# Patient Record
Sex: FEMALE | Race: WHITE
[De-identification: names, ages, dates, MRNs, and addresses within clinical notes are randomized per-mention and may not be internally consistent; named-entity substitution may affect disease eponyms.]

---

## 2019-02-15 NOTE — NUR
Pharmacy Vancomycin Dosing Note



S:Consulted to monitor and dose vancomycin started 02/15/19.



O:VAL NOLASCO is a 58 year old F with 

Cellulitis .



Height: 5 feet, 4 inches

Weight: 74.930200 kg

Ideal Body Weight: 54.70 

Adjusted Body Weight: 62.82 

Dosing Weight: Actual



Other Antibiotics: 

CIPRO X 1, MNZ X 1 2/15



LABS:

Last BUN: 24 

Last Creatinine: 1.1 

Creatinine Clearance: 55 mL/min

Last WBC: 10.3 

Last Procalcitonin: N/A 

Tmax (past 24 hours): 98.8 



Microbiology: 

2/15 NONE



I/O: - 



Drug Levels:

Last  level:  on  at 

Last dose given 02/15/19 at 1800 



Vancomycin Dosing:

Loading Dose: 1500 mg x1

Dosing Weight: Actual

Target Trough: 10-20





A: Based on: WEIGHT, CRCL~55, NON-SEVERE INFECTION,





P: 1. Initiate Vancomycin 1000 mg IV q24h after 1250 mg loading dose.

   2. Follow up Trough level on 02/17/19 at 1730 

   3. Pharmacy will continue to monitor, follow and adjust therapy as needed.

 

 SATURNINO DUKE RPH, 02/15/19 2014

## 2019-02-15 NOTE — RAD
CT scan of the abdomen and pelvis with contrast 2/15/2019

 

CLINICAL HISTORY: Abdominal pain and bleeding around fistula site. 

Colectomy.

 

TECHNIQUE: After the intravenous administration of 60 cc of Omnipaque 300 

only, contiguous, 5 mm axial sections were obtained through the abdomen 

and pelvis. 

 

One or more of the following individualized dose reduction techniques were

utilized for this study:

 

1. Automated exposure control.

2. Adjustment of the mA and/or kV according to patient size.

3. Use of iterative reconstruction technique.

 

FINDINGS: No previous imaging studies are available for comparison.

 

The absence of oral contrast material limits the study for the detection 

of bowel pathology.

 

Images through the lung bases demonstrate mild cardiomegaly. There is a 

small left pleural effusion. Left lower lobe atelectasis and/or infiltrate

is seen.

 

The liver is mild to moderately enlarged measuring 23 cm in length. 

Decreased attenuation of the liver parenchyma is seen consistent with 

fatty infiltration. The spleen is mildly enlarged measuring 13.5 cm in 

length. Fullness of the left adrenal gland is seen. The patient appears to

be post right adrenalectomy. No focal abnormality of the right kidney is 

noted. A 4 cm rounded low-attenuation lesion is seen involving the 

superior/midpole left kidney. This likely represents a cyst.

 

The head of the pancreas is enlarged. Increased density is seen within the

adjacent fat. These findings are consistent with pancreatitis. A somewhat 

rounded low-attenuation area is seen within the lateral aspect of the head

of the pancreas which measures 3.1 cm in greatest diameter. This is felt 

to most likely represent a pancreatic pseudocyst.

 

Atherosclerotic calcification of the abdominal aorta and its branches is 

noted. The abdominal aorta tapers normally. Surgical clips are seen in the

region of the gallbladder fossa consistent with a cholecystectomy. The 

patient appears to be post right hemicolectomy. Multiple diverticula are 

seen involving the transverse, descending and sigmoid colon. No 

inflammatory changes are seen in the adjacent fat. A large ventral hernia 

is seen which contains the majority of the bowel. The hernia sac measures 

32 cm transverse diameter. Surgical clips are seen within the right 

abdomen in the region of the ileum. Loops of ileum extend immediately 

adjacent to the anterior abdominal wall within the right lobe lower 

quadrant of the abdomen which appears to represent an ileostomy. There is 

no evidence of bowel obstruction. No free fluid or free air is seen within

the abdomen. No abnormal fluid collection is seen to suggest evidence of 

an abscess.

 

Images through the pelvis demonstrate the urinary bladder to be 

contracted. A punctate calcification is seen within the left pelvis 

consistent with a phlebolith. No free fluid is seen. Minimal S-shaped 

curvature of the thoracolumbar spine is seen. Degenerative changes are 

seen involving the lower thoracic and throughout the lumbar spine and both

hips.

 

IMPRESSION:

1. Postsurgical changes are seen as outlined above. There is no evidence 

of obstruction. No abscess is seen.

2. The head of the pancreas is enlarged. Increased density is seen within 

the adjacent fat. These findings likely reflect acute pancreatitis. A 3.1 

cm low-attenuation area is seen lateral to the head of the pancreas is 

felt to most represent a pancreatic pseudocyst.

3. Hepatosplenomegaly with fatty infiltration of the liver.

 

Electronically signed by: Maurice Henriquez MD (2/15/2019 6:00 PM) [facility]

## 2019-02-15 NOTE — PHYS DOC
Past Medical History


Past Medical History:  COPD, Diverticulitis


Additional Past Medical Histor:  OSTOMY IN 2007, FISTULA IN 2018


 (JERZY SIERRA)


Past Surgical History:  Colectomy


 (JERZY SIERRA)


Alcohol Use:  None


Drug Use:  None


 (JERZY SIERRA)





Adult General


Chief Complaint


Chief Complaint:  GI PROBLEM





HPI


HPI





Patient is a 58  year old female who presents to the ER with complaints of 

nausea and vomiting for the last 3 days. Pt also complains of bleeding around 

her abdominal fistula since this morning. She denies any fever, cough, 

shortness of breath, or chest pain. She states that the skin around her fistula 

and ileostomy bags has been red, warm, and tender for several days.  She denies 

any pain at this time. PT states that she has a PICC line in her right arm that 

she has been receiving TPN in since August. Pt states that just last week she 

started eating solids again. She denies any difficulty with eating or food 

intolerance.


 (JERZY SIERRA)





Review of Systems


Review of Systems





Constitutional: Denies fever or chills []


HENT: Denies nasal congestion or sore throat []


Respiratory: Denies cough or shortness of breath []


Cardiovascular: No additional information not addressed in HPI []


GI: See HPI


: Denies dysuria or hematuria []


Musculoskeletal: Denies back pain or joint pain []


Integument: See HPI


Neurologic: Denies headache, focal weakness or sensory changes []


Endocrine: Denies polyuria or polydipsia []





Complete systems were reviewed and found to be within normal limits, except as 

documented in this note.


 (JERZY SIERRA)





Current Medications


Current Medications





Current Medications








 Medications


  (Trade)  Dose


 Ordered  Sig/Laura  Start Time


 Stop Time Status Last Admin


Dose Admin


 


 Info


  (CONTRAST GIVEN


 -- Rx MONITORING)  1 each  PRN DAILY  PRN  2/15/19 16:00


 2/17/19 15:59   





 


 Iohexol


  (Omnipaque 300


 Mg/ml)  60 ml  1X  ONCE  2/15/19 16:30


 2/15/19 16:33 DC 2/15/19 17:25


60 ML


 


 Morphine Sulfate


  (Morphine


 Sulfate)  4 mg  1X  ONCE  2/15/19 16:30


 2/15/19 16:33 DC 2/15/19 16:46


4 MG


 


 Ondansetron HCl


  (Zofran)  4 mg  1X  ONCE  2/15/19 16:30


 2/15/19 16:31 DC 2/15/19 16:43


4 MG


 


 Sodium Chloride  1,000 ml @ 


 1,000 mls/hr  1X  ONCE  2/15/19 16:30


 2/15/19 17:29 DC 2/15/19 16:39


1,000 MLS/HR


 


 Vancomycin HCl


  (Vanco Per


 Pharmacy)  1 each  PRN DAILY  PRN  2/15/19 17:15


    2/15/19 20:13


1 EACH


 


 Vancomycin HCl


 1.5 gm/Sodium


 Chloride  500 ml @ 


 250 mls/hr  1X  ONCE  2/15/19 17:30


 2/15/19 19:29 DC 2/15/19 18:17


250 MLS/HR





 (YI TORREZ MD)





Allergies


Allergies





Allergies








Coded Allergies Type Severity Reaction Last Updated Verified


 


  hydrocodone Allergy Intermediate Nausea 2/15/19 Yes





 (YI TORREZ MD)





Physical Exam


Physical Exam





Constitutional: Well developed, well nourished, no acute distress, non-toxic 

appearance, obese []


HENT: Normocephalic, atraumatic, bilateral external ears normal, oropharynx 

moist, nose normal. []


Eyes: conjunctiva normal, no discharge. [] 


Neck: Normal range of motion, no stridor. [] 


Cardiovascular:Heart rate regular rhythm, no murmur []


Lungs & Thorax:  Bilateral breath sounds clear to auscultation []


Abdomen: Bowel sounds normal, soft, tenderness to palpation of RUQ and LUQ, no 

guarding, fistula and ileostomy present and draining yellow stool, no masses, 

no pulsatile masses. [] 


Skin: Warm, dry; skin surrounding abdominal fistula and ileostomy there is 

bright red blood present under the dressing no visible laceration. 


Extremities: No cyanosis, no clubbing, ROM intact, no edema. [] 


Neurologic: Alert and oriented X 3, normal motor function, normal sensory 

function, no focal deficits noted. []


Psychologic: Affect normal, judgement normal, mood normal. []


 (JERZY SIERRA)





Current Patient Data


Vital Signs





 Vital Signs








  Date Time  Temp Pulse Resp B/P (MAP) Pulse Ox O2 Delivery O2 Flow Rate FiO2


 


2/15/19 17:16   20  98 Room Air  


 


2/15/19 16:46       2.0 


 


2/15/19 14:44 98.8 110  114/64 (81)    





 98.8       





 (YI TORREZ MD)


Lab Values





 Laboratory Tests








Test


 2/15/19


14:35 2/15/19


15:10


 


White Blood Count


 10.3 x10^3/uL


(4.0-11.0) 





 


Red Blood Count


 4.08 x10^6/uL


(3.50-5.40) 





 


Hemoglobin


 10.1 g/dL


(12.0-15.5)  L 





 


Hematocrit


 32.7 %


(36.0-47.0)  L 





 


Mean Corpuscular Volume


 80 fL ()


 





 


Mean Corpuscular Hemoglobin 25 pg (25-35)   


 


Mean Corpuscular Hemoglobin


Concent 31 g/dL


(31-37) 





 


Red Cell Distribution Width


 19.3 %


(11.5-14.5)  H 





 


Platelet Count


 338 x10^3/uL


(140-400) 





 


Neutrophils (%) (Auto) 76 % (31-73)  H 


 


Lymphocytes (%) (Auto) 10 % (24-48)  L 


 


Monocytes (%) (Auto) 10 % (0-9)  H 


 


Eosinophils (%) (Auto) 3 % (0-3)   


 


Basophils (%) (Auto) 1 % (0-3)   


 


Neutrophils # (Auto)


 7.8 x10^3uL


(1.8-7.7)  H 





 


Lymphocytes # (Auto)


 1.0 x10^3/uL


(1.0-4.8) 





 


Monocytes # (Auto)


 1.0 x10^3/uL


(0.0-1.1) 





 


Eosinophils # (Auto)


 0.3 x10^3/uL


(0.0-0.7) 





 


Basophils # (Auto)


 0.1 x10^3/uL


(0.0-0.2) 





 


Sodium Level


 131 mmol/L


(136-145)  L 





 


Potassium Level


 3.9 mmol/L


(3.5-5.1) 





 


Chloride Level


 96 mmol/L


()  L 





 


Carbon Dioxide Level


 28 mmol/L


(21-32) 





 


Anion Gap 7 (6-14)   


 


Blood Urea Nitrogen


 24 mg/dL


(7-20)  H 





 


Creatinine


 1.1 mg/dL


(0.6-1.0)  H 





 


Estimated GFR


(Cockcroft-Gault) 51.2  


 





 


BUN/Creatinine Ratio 22 (6-20)  H 


 


Glucose Level


 122 mg/dL


(70-99)  H 





 


Calcium Level


 9.4 mg/dL


(8.5-10.1) 





 


Total Bilirubin


 0.3 mg/dL


(0.2-1.0) 





 


Aspartate Amino Transferase


(AST) 140 U/L


(15-37)  H 





 


Alanine Aminotransferase (ALT)


 82 U/L (14-59)


H 





 


Alkaline Phosphatase


 189 U/L


()  H 





 


Total Protein


 8.0 g/dL


(6.4-8.2) 





 


Albumin


 2.2 g/dL


(3.4-5.0)  L 





 


Albumin/Globulin Ratio


 0.4 (1.0-1.7)


L 





 


Lipase


 487 U/L


()  H 





 


Stool Occult Blood


 


 Negative (NEG)








 Laboratory Tests


2/15/19 14:35








 Laboratory Tests


2/15/19 14:35








 (YI TORREZ MD)





EKG


EKG


[]


 (JERZY SIERRA)





Radiology/Procedures


Radiology/Procedures


PROCEDURE: CT ABD PELV W/ IV CONTRST ONLY





CT scan of the abdomen and pelvis with contrast 2/15/2019


 


CLINICAL HISTORY: Abdominal pain and bleeding around fistula site. 


Colectomy.


 


TECHNIQUE: After the intravenous administration of 60 cc of Omnipaque 300 


only, contiguous, 5 mm axial sections were obtained through the abdomen 


and pelvis. 


 


One or more of the following individualized dose reduction techniques were


utilized for this study:


 


1. Automated exposure control.


2. Adjustment of the mA and/or kV according to patient size.


3. Use of iterative reconstruction technique.


 


FINDINGS: No previous imaging studies are available for comparison.


 


The absence of oral contrast material limits the study for the detection 


of bowel pathology.


 


Images through the lung bases demonstrate mild cardiomegaly. There is a 


small left pleural effusion. Left lower lobe atelectasis and/or infiltrate


is seen.


 


The liver is mild to moderately enlarged measuring 23 cm in length. 


Decreased attenuation of the liver parenchyma is seen consistent with 


fatty infiltration. The spleen is mildly enlarged measuring 13.5 cm in 


length. Fullness of the left adrenal gland is seen. The patient appears to


be post right adrenalectomy. No focal abnormality of the right kidney is 


noted. A 4 cm rounded low-attenuation lesion is seen involving the 


superior/midpole left kidney. This likely represents a cyst.


 


The head of the pancreas is enlarged. Increased density is seen within the


adjacent fat. These findings are consistent with pancreatitis. A somewhat 


rounded low-attenuation area is seen within the lateral aspect of the head


of the pancreas which measures 3.1 cm in greatest diameter. This is felt 


to most likely represent a pancreatic pseudocyst.


 


Atherosclerotic calcification of the abdominal aorta and its branches is 


noted. The abdominal aorta tapers normally. Surgical clips are seen in the


region of the gallbladder fossa consistent with a cholecystectomy. The 


patient appears to be post right hemicolectomy. Multiple diverticula are 


seen involving the transverse, descending and sigmoid colon. No 


inflammatory changes are seen in the adjacent fat. A large ventral hernia 


is seen which contains the majority of the bowel. The hernia sac measures 


32 cm transverse diameter. Surgical clips are seen within the right 


abdomen in the region of the ileum. Loops of ileum extend immediately 


adjacent to the anterior abdominal wall within the right lobe lower 


quadrant of the abdomen which appears to represent an ileostomy. There is 


no evidence of bowel obstruction. No free fluid or free air is seen within


the abdomen. No abnormal fluid collection is seen to suggest evidence of 


an abscess.


 


Images through the pelvis demonstrate the urinary bladder to be 


contracted. A punctate calcification is seen within the left pelvis 


consistent with a phlebolith. No free fluid is seen. Minimal S-shaped 


curvature of the thoracolumbar spine is seen. Degenerative changes are 


seen involving the lower thoracic and throughout the lumbar spine and both


hips.


 


IMPRESSION:


1. Postsurgical changes are seen as outlined above. There is no evidence 


of obstruction. No abscess is seen.


2. The head of the pancreas is enlarged. Increased density is seen within 


the adjacent fat. These findings likely reflect acute pancreatitis. A 3.1 


cm low-attenuation area is seen lateral to the head of the pancreas is 


felt to most represent a pancreatic pseudocyst.


3. Hepatosplenomegaly with fatty infiltration of the liver.[]


 (JERZY SIERRA)





Course & Med Decision Making


Course & Med Decision Making


Pertinent Labs and Imaging studies reviewed. (See chart for details)


Dx Abdominal wall cellulitis, pancreatitis


CBC: hgb10.1 hct 32.7, BUN 24, Crt 1.1, gluc 122, , ALT 82, Alk Phos 189

, Lipase 487, stool occult blood negative


CT ABD/PEL reveals no abscess or obstruction, pancreatitis, hepatosplenomegaly 

with fatty infiltration





1624- Briefly discussed case with Dr. Felix prior to CT results.


1834 Because no surgical emergency was identified in the ER. Spoke with patient'

s primary care Dr. Chacon, will admit patient for abdominal cellulitis and 

pancreatitis. Vancomycin pharmacy to dose, cipro 400 mg IV, and flagyl 500 mg 

IV were ordered. Pt was given 2 doses of morphine 4 mg IV in the ER. Consults 

written for wound care, infectious disease, and gastroenterology as requested 

by Dr. Chacon.  








[]


 (JERZY SIERRA)


Course & Med Decision Making





Staff Physician Addendum:


I was working in the ER during the course of this patient's visit.  I was 

available for consultation as needed, but I was not directly involved in the 

care of this patient.    


 (YI TORREZ MD)


Dragon Disclaimer


Dragon Disclaimer


This electronic medical record was generated, in whole or in part, using a 

voice recognition dictation system.


 (JERZY SIERRA)





Departure


Departure


Impression:  


 Primary Impression:  


 Abdominal wall cellulitis


 Additional Impression:  


 Pancreatitis


Disposition:  09 ADMITTED AS INPATIENT


Admitting Physician:  Davi Chacon


 (JERZY SIERRA)


Condition:  STABLE


Referrals:  


UNKNOWN PCP NAME (PCP)





Problem Qualifiers








 Additional Impression:  


 Pancreatitis


 Chronicity:  acute  Pancreatitis type:  unspecified pancreatitis type  Acute 

pancreatitis complication:  no infection or necrosis  Qualified Codes:  K85.90 

- Acute pancreatitis without necrosis or infection, unspecified








JERZY SIERRA Feb 15, 2019 16:51


YI TORREZ MD Feb 16, 2019 06:00

## 2019-02-16 NOTE — PDOC
Infectious Disease Note


Vital Sign


Vital Signs





Vital Signs








  Date Time  Temp Pulse Resp B/P (MAP) Pulse Ox O2 Delivery O2 Flow Rate FiO2


 


2/16/19 12:16      Nasal Cannula 3.0 


 


2/16/19 11:00 97.9 94 18 113/71 (85) 97   





 97.9       











Labs


Lab





Laboratory Tests








Test


 2/15/19


14:35 2/15/19


15:10 2/16/19


09:00


 


White Blood Count


 10.3 x10^3/uL


(4.0-11.0) 


 





 


Red Blood Count


 4.08 x10^6/uL


(3.50-5.40) 


 





 


Hemoglobin


 10.1 g/dL


(12.0-15.5) 


 





 


Hematocrit


 32.7 %


(36.0-47.0) 


 





 


Mean Corpuscular Volume 80 fL ()   


 


Mean Corpuscular Hemoglobin 25 pg (25-35)   


 


Mean Corpuscular Hemoglobin


Concent 31 g/dL


(31-37) 


 





 


Red Cell Distribution Width


 19.3 %


(11.5-14.5) 


 





 


Platelet Count


 338 x10^3/uL


(140-400) 


 





 


Neutrophils (%) (Auto) 76 % (31-73)   


 


Lymphocytes (%) (Auto) 10 % (24-48)   


 


Monocytes (%) (Auto) 10 % (0-9)   


 


Eosinophils (%) (Auto) 3 % (0-3)   


 


Basophils (%) (Auto) 1 % (0-3)   


 


Neutrophils # (Auto)


 7.8 x10^3uL


(1.8-7.7) 


 





 


Lymphocytes # (Auto)


 1.0 x10^3/uL


(1.0-4.8) 


 





 


Monocytes # (Auto)


 1.0 x10^3/uL


(0.0-1.1) 


 





 


Eosinophils # (Auto)


 0.3 x10^3/uL


(0.0-0.7) 


 





 


Basophils # (Auto)


 0.1 x10^3/uL


(0.0-0.2) 


 





 


Sodium Level


 131 mmol/L


(136-145) 


 135 mmol/L


(136-145)


 


Potassium Level


 3.9 mmol/L


(3.5-5.1) 


 4.0 mmol/L


(3.5-5.1)


 


Chloride Level


 96 mmol/L


() 


 100 mmol/L


()


 


Carbon Dioxide Level


 28 mmol/L


(21-32) 


 24 mmol/L


(21-32)


 


Anion Gap 7 (6-14)   11 (6-14) 


 


Blood Urea Nitrogen


 24 mg/dL


(7-20) 


 18 mg/dL


(7-20)


 


Creatinine


 1.1 mg/dL


(0.6-1.0) 


 1.0 mg/dL


(0.6-1.0)


 


Estimated GFR


(Cockcroft-Gault) 51.2 


 


 57.1 





 


BUN/Creatinine Ratio 22 (6-20)   18 (6-20) 


 


Glucose Level


 122 mg/dL


(70-99) 


 126 mg/dL


(70-99)


 


Calcium Level


 9.4 mg/dL


(8.5-10.1) 


 9.0 mg/dL


(8.5-10.1)


 


Total Bilirubin


 0.3 mg/dL


(0.2-1.0) 


 0.2 mg/dL


(0.2-1.0)


 


Aspartate Amino Transf


(AST/SGOT) 140 U/L


(15-37) 


 118 U/L


(15-37)


 


Alanine Aminotransferase


(ALT/SGPT) 82 U/L (14-59) 


 


 77 U/L (14-59) 





 


Alkaline Phosphatase


 189 U/L


() 


 164 U/L


()


 


Total Protein


 8.0 g/dL


(6.4-8.2) 


 7.4 g/dL


(6.4-8.2)


 


Albumin


 2.2 g/dL


(3.4-5.0) 


 2.1 g/dL


(3.4-5.0)


 


Albumin/Globulin Ratio 0.4 (1.0-1.7)   0.4 (1.0-1.7) 


 


Lipase


 487 U/L


() 


 287 U/L


()


 


Stool Occult Blood  Negative (NEG)  











Objective


Assessment


Abdominal cellulitis 


Acute pancreatitis with pseudocyst 


EC fistula, on chronic TPN via PICC line since Aug 2018.  


PCN allergy listed. PT says she's not though


Transaminitis 


COPD





Plan


Plan of Care


vanc and add meropenem and micafungin 


One time dose Cipro and Flagyl in ER, 2/15 


Monitor labs/renal function closely


Consult wound care team 





Thank you 


[phone]


Patient seen and examined. Chart reviewed in detail. Case d/w NP. Agree with 

above plan.











SUBLTHANIA VALDEZ Feb 16, 2019 13:34


CHEO CHAMPAGNE MD Feb 16, 2019 23:19
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
## 2019-02-16 NOTE — CONS
DATE OF CONSULTATION:  02/16/2019



REFERRED BY:  JOSHUA Hunt



REASON FOR CONSULTATION:  Abdominal cellulitis and pancreatitis.



HISTORY OF PRESENT ILLNESS:  This patient is a 58-year-old  female who

in 2007 underwent an ileocecectomy and ileostomy secondary to a cecal

perforation from a colonoscopy.  Over time, her ileostomy had been retracting

and causing leakage from her stoma appliance causing chronic issues with skin

excoriation.  She was admitted to [facility] in July 2018 and underwent an ileostomy

takedown, primary incisional hernia repair and soft tissue rearrangement flaps. 

Postop was complicated by sepsis, ileocolonic and stenotic leak and

intra-abdominal abscess.  She underwent multiple abdominal surgeries, ileostomy

formation/revision and debridement of necrotic subcutaneous flap closure.  She

now has an enteroatmospheric fistula and has been maintained on TPN since August 2018.  She was residing at a skilled nursing facility when she developed

worsening skin irritation and bleeding around the stoma along with nausea and

vomiting.  An abdominal/pelvis CT revealed head of the pancreas enlargement and

an increased density within the adjacent fat, likely reflecting acute

pancreatitis.  A 3.1 cm low attenuation area lateral to the head of the pancreas

felt to most represent a pancreatic pseudocyst.  She was afebrile with a normal

white blood cell count and lipase level of 487.  She was dosed with vancomycin

along with one-time dose of Cipro and metronidazole in the ER.  ID has been

asked to consult for further evaluation and antibiotic management.



The patient says that she has been weaning off the TPN and believes she has 1

more bag left.  Her diet has been advanced and she is tolerating oral intake

well.  She denies fevers, chills, sweats or bodyaches.  Denies of further

nausea, vomiting or abdominal pain.



PAST MEDICAL HISTORY:  Morbid obesity, lung cancer, obstructive sleep apnea,

kidney stones, hypertension, depression, arthritis.  COPD, oxygen dependent.



PAST SURGICAL HISTORY:  As mentioned above.



FAMILY HISTORY:  Noncontributory.



SOCIAL HISTORY:  The patient has been residing in a skilled nursing facility

prior to this admission.



ALLERGIES:  ASPIRIN AND HYDROCODONE.  PENICILLIN ALSO LISTED, but she says she

is not allergic to it.



MEDICATIONS:  Vancomycin.  Onetime dose of Cipro and metronidazole on February 15th in ER.  Other medications are available and have been reviewed on the MAR.



REVIEW OF SYSTEMS:  Per HPI.  Otherwise, all other review of systems are

negative.



PHYSICAL EXAMINATION:

VITAL SIGNS:  Temperature is 97.9, blood pressure 113/71, heart rate 94,

respiratory rate 18, pulse oximetry is 97% on 3 liters oxygen.

GENERAL:  The patient is propped up in bed, alert and eating a peanut butter and

crackers.

HEENT:  Pupils are equally round.  Normal conjunctivae.  Oral cavity:  Pharynx

pink and moist.

NECK:  Supple.

LUNGS:  Clear to auscultation.

HEART:  S1, S2.

ABDOMEN:  Obese, soft and nontender with bowel sounds present.  She has 2 stomas

with surrounding excoriation and redness and is tender.

EXTREMITIES:  No gross edema or cyanosis.

SKIN:  Warm without generalized rash.

NEUROLOGIC:  Alert and oriented x 3

LINES:  RUE-PICC (POA) without signs of any complications.



LABORATORY DATA:  Recent WBC 10.3; hemoglobin 10.1; platelet count 338,000. 

Creatinine 1.0, BUN 18, sodium 135, potassium 4.0, glucose 126, total bilirubin

0.2,  from 140, ALT of 77 from 82, albumin 2.1, lipase 286 from 487. 

MRSA PCR pending.



IMAGING:  Abdominal/pelvic CT per HPI.  Imaging also showed hepatosplenomegaly

with fatty infiltration of the liver along with no evidence of bowel obstruction

or abscess seen.



ASSESSMENT:

1.  Abdominal cellulitis.

2.  Acute pancreatitis with pseudocyst.

3.  Enterocutaneous fistula, on chronic total parenteral nutrition via

peripherally inserted central catheter line since August 2018.

4.  PENICILLIN ALLERGY LISTED, but the patient says she is not allergic to it.

5.  Transaminitis.

6.  Chronic obstructive pulmonary disease.



PLAN:  Continue the vancomycin.  We will add meropenem and micafungin.  Continue

to monitor laboratory values and renal function closely.  Wound care team has

been consulted.



Thank you, Cass Segundo, for asking us to participate in this patient's

care.  Should you have further questions or concerns, please call.

 



______________________________

CHEO CHAMPAGNE MD



DR:  JAYRO/chapincito  JOB#:  [id number] / [id number]

DD:  02/16/2019 13:33  DT:  02/16/2019 23:12

## 2019-02-17 NOTE — PDOC
Infectious Disease Note


Subjective


Subjective


Feeling pretty good


No further N/V


Efren F/C/S/SOA





ROS


ROS


per HPI





Vital Sign


Vital Signs





Vital Signs








  Date Time  Temp Pulse Resp B/P (MAP) Pulse Ox O2 Delivery O2 Flow Rate FiO2


 


2/17/19 09:54     100 Nasal Cannula 3.0 


 


2/17/19 07:20 98.7 106 18 107/65 (79)    





 98.7       











Physical Exam


PHYSICAL EXAM


GENERAL:  Propped up in bed, relaxed appearance, smiling 


HEENT:  Pupils are equally round.  Normal conjunctivae.  Oral cavity:  Pharynx 

pink and moist.


NECK:  Supple.


LUNGS:  Clear to auscultation.


HEART:  S1, S2.


ABDOMEN:  Obese, soft and nontender with bowel sounds present.  She has 2 stomas


with surrounding excoriation, tender 


EXTREMITIES:  No gross edema or cyanosis.


SKIN:  Warm without generalized rash.


NEUROLOGIC:  Alert and oriented x 3


RUE-PICC (POA) without signs of any complications.





Labs


Lab





Laboratory Tests








Test


 2/17/19


05:10


 


White Blood Count


 7.4 x10^3/uL


(4.0-11.0)


 


Red Blood Count


 3.75 x10^6/uL


(3.50-5.40)


 


Hemoglobin


 9.2 g/dL


(12.0-15.5)


 


Hematocrit


 30.2 %


(36.0-47.0)


 


Mean Corpuscular Volume 81 fL () 


 


Mean Corpuscular Hemoglobin 25 pg (25-35) 


 


Mean Corpuscular Hemoglobin


Concent 30 g/dL


(31-37)


 


Red Cell Distribution Width


 18.8 %


(11.5-14.5)


 


Platelet Count


 308 x10^3/uL


(140-400)


 


Prothrombin Time


 13.5 SEC


(11.7-14.0)


 


Prothromb Time International


Ratio 1.1 (0.8-1.1) 





 


Sodium Level


 135 mmol/L


(136-145)


 


Potassium Level


 4.2 mmol/L


(3.5-5.1)


 


Chloride Level


 101 mmol/L


()


 


Carbon Dioxide Level


 24 mmol/L


(21-32)


 


Anion Gap 10 (6-14) 


 


Blood Urea Nitrogen


 13 mg/dL


(7-20)


 


Creatinine


 0.9 mg/dL


(0.6-1.0)


 


Estimated GFR


(Cockcroft-Gault) 64.5 





 


BUN/Creatinine Ratio 14 (6-20) 


 


Glucose Level


 95 mg/dL


(70-99)


 


Calcium Level


 8.9 mg/dL


(8.5-10.1)


 


Total Bilirubin


 0.2 mg/dL


(0.2-1.0)


 


Aspartate Amino Transf


(AST/SGOT) 93 U/L (15-37) 





 


Alanine Aminotransferase


(ALT/SGPT) 69 U/L (14-59) 





 


Alkaline Phosphatase


 148 U/L


()


 


Total Protein


 7.0 g/dL


(6.4-8.2)


 


Albumin


 2.0 g/dL


(3.4-5.0)


 


Albumin/Globulin Ratio 0.4 (1.0-1.7) 


 


Lipase


 241 U/L


()











Objective


Assessment


Abdominal cellulitis 


Acute pancreatitis with ? pseudocyst 


EC fistula, on chronic TPN via PICC line since Aug 2018.  Now tolerating 

advance diet 


PCN allergy listed. PT says she's not though


Transaminitis - improving 


COPD 


Positive MRSA nares





Plan


Plan of Care


vanc, meropenem and micafungin 


One time dose Cipro and Flagyl in ER, 2/15 


Monitor labs/renal function closely


Wound care team consulted





D/w Dr. Chacon








Patient seen and examined. Chart reviewed in detail. Case discussed with NP. 

Agree with above plan.











THANIA LORD Feb 17, 2019 10:19


CHEO CHAMPAGNE MD Feb 17, 2019 20:03
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
## 2019-02-18 NOTE — PN
DATE:  02/18/2019



SUBJECTIVE:  The patient is resting, slightly propped up in bed, in no apparent

respiratory distress.  She is awake, alert.  On questioning her, she denied any

complaints.  In particular denied any chills, rigors, or fever.  She continued

to have pain, although it is well controlled.  She was evaluated by the wound

care team.  They will come and evaluate her again.  I left a message with Dr. Corcoran's office to call me regarding possible transfer her to  or any other

recommendation.



OBJECTIVE:

GENERAL:  When I saw her this morning, she looked well and was clearly in no

apparent respiratory distress.  She was pale, but no jaundice, cyanosis, or

thyromegaly.  No jugular venous distension.  No lower limb edema.

VITAL SIGNS:  Her heart rate was 99, blood pressure was 100/56, temperature was

98.2, respiratory rate was 14, and oxygen saturation was 100% on 2 liters of

oxygen.

HEAD, EYES, EARS, NOSE AND THROAT:  Normocephalic, atraumatic.

NECK:  Supple.

HEART:  Showed normal first and second heart sounds with no gallop, rub, or

murmur.

CHEST:  Clear to auscultation.  No crepitation or rhonchi.

ABDOMEN:  Distended, soft, nontender.  She has extensive excoriation and

erythema of the abdominal wall; however, there is no tenderness, no guarding or

rigidity. No organomegaly.  All hernial orifice intact.  Bowel sounds normal.

NEUROLOGIC:  She is sleepy, but arousable.  All cranial nerves intact.  She

moves extremities without difficulty.

INS AND OUTS:  Her intake over the last 24 hours was 416 output was recorded.



LABORATORY DATA:  As of yesterday showed a white cell count 7400, hemoglobin

9.2, hematocrit 30, MCV 81, and platelet count 308,000.  Her chemistry showed a

serum sodium 136, potassium 4, chloride 103, bicarbonate 26, anion gap of 7, BUN

10, creatinine 0.9, estimated GFR was 64 mL per minute.  Her glucose was 112,

calcium was 8.6.  Total magnesium was 1.8.  Total bilirubin, AST, ALT, alkaline

phosphatase are slightly elevated, although they are trending down.  Her ammonia

was normal at 16.  Total protein was 6.7, albumin was 1.9.  Her prothrombin time

was 15.5, INR 1.1.



ASSESSMENT:

1.  Acute pancreatitis with questionable pseudocyst and a CT scan.  Her serum

amylase is now within normal limit.  She is tolerating her full liquid diet and

advance her diet as tolerated.

2.  Abdominal wall cellulitis, which she is on multiple antibiotics including

meropenem, vancomycin, micafungin.

3.  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

regular diet.  She was on total parenteral nutrition up until about a week ago. 

The skin excoriation has dramatically worsened.



PLAN:  The plan is to continue with her IV antibiotic.  Continue with pain

management.  I have contacted Dr. Celio Corcoran the surgeon and awaiting his

call to decide whether the patient needs to be transferred to [facility]

or any other recommendation.

 



______________________________

RAISA HURLEY MD



DR:  ALIYA/nts  JOB#:  [id number] / [id number]

DD:  02/18/2019 09:40  DT:  02/18/2019 20:01

## 2019-02-18 NOTE — PDOC
Subjective:


Subjective:


Patient was examined at bed side. No acute events overnight.





Objective:


Vital Signs:





 Vital Signs








  Date Time  Temp Pulse Resp B/P (MAP) Pulse Ox O2 Delivery O2 Flow Rate FiO2


 


19 17:20      Nasal Cannula 3.0 


 


19 15:00 98.1 92 16 108/59 (75) 98   





 98.1       








Imaging:


 [facility]


 [address] [place] [address]


 [phone]


 


 IMAGING REPORT





 Signed





PATIENT: VAL NOLASCO ACCOUNT: [id number] MRN#: [id number]


: 1960 LOCATION: ER AGE: 58


SEX: F EXAM DT: 02/15/19 ACCESSION#: [id number]


STATUS: REG ER ORD. PHYSICIAN: JERZY SIERRA 


REASON: abd pain


PROCEDURE: CT ABD PELV W/ IV CONTRST ONLY





CT scan of the abdomen and pelvis with contrast 2/15/2019


 


CLINICAL HISTORY: Abdominal pain and bleeding around fistula site. 


Colectomy.


 


TECHNIQUE: After the intravenous administration of 60 cc of Omnipaque 300 


only, contiguous, 5 mm axial sections were obtained through the abdomen 


and pelvis. 


 


One or more of the following individualized dose reduction techniques were


utilized for this study:


 


1. Automated exposure control.


2. Adjustment of the mA and/or kV according to patient size.


3. Use of iterative reconstruction technique.


 


FINDINGS: No previous imaging studies are available for comparison.


 


The absence of oral contrast material limits the study for the detection 


of bowel pathology.


 


Images through the lung bases demonstrate mild cardiomegaly. There is a 


small left pleural effusion. Left lower lobe atelectasis and/or infiltrate


is seen.


 


The liver is mild to moderately enlarged measuring 23 cm in length. 


Decreased attenuation of the liver parenchyma is seen consistent with 


fatty infiltration. The spleen is mildly enlarged measuring 13.5 cm in 


length. Fullness of the left adrenal gland is seen. The patient appears to


be post right adrenalectomy. No focal abnormality of the right kidney is 


noted. A 4 cm rounded low-attenuation lesion is seen involving the 


superior/midpole left kidney. This likely represents a cyst.


 


The head of the pancreas is enlarged. Increased density is seen within the


adjacent fat. These findings are consistent with pancreatitis. A somewhat 


rounded low-attenuation area is seen within the lateral aspect of the head


of the pancreas which measures 3.1 cm in greatest diameter. This is felt 


to most likely represent a pancreatic pseudocyst.


 


Atherosclerotic calcification of the abdominal aorta and its branches is 


noted. The abdominal aorta tapers normally. Surgical clips are seen in the


region of the gallbladder fossa consistent with a cholecystectomy. The 


patient appears to be post right hemicolectomy. Multiple diverticula are 


seen involving the transverse, descending and sigmoid colon. No 


inflammatory changes are seen in the adjacent fat. A large ventral hernia 


is seen which contains the majority of the bowel. The hernia sac measures 


32 cm transverse diameter. Surgical clips are seen within the right 


abdomen in the region of the ileum. Loops of ileum extend immediately 


adjacent to the anterior abdominal wall within the right lobe lower 


quadrant of the abdomen which appears to represent an ileostomy. There is 


no evidence of bowel obstruction. No free fluid or free air is seen within


the abdomen. No abnormal fluid collection is seen to suggest evidence of 


an abscess.


 


Images through the pelvis demonstrate the urinary bladder to be 


contracted. A punctate calcification is seen within the left pelvis 


consistent with a phlebolith. No free fluid is seen. Minimal S-shaped 


curvature of the thoracolumbar spine is seen. Degenerative changes are 


seen involving the lower thoracic and throughout the lumbar spine and both


hips.


 


IMPRESSION:


1. Postsurgical changes are seen as outlined above. There is no evidence 


of obstruction. No abscess is seen.


2. The head of the pancreas is enlarged. Increased density is seen within 


the adjacent fat. These findings likely reflect acute pancreatitis. A 3.1 


cm low-attenuation area is seen lateral to the head of the pancreas is 


felt to most represent a pancreatic pseudocyst.


3. Hepatosplenomegaly with fatty infiltration of the liver.


 


Electronically signed by: Maurice Henriquez MD (2/15/2019 6:00 PM) [facility]














DICTATED and SIGNED BY:     MAURICE HENRIQUEZ MD


DATE:     02/15/19 1743





PE:





GEN: NAD


HEENT: Atraumatic, PERRLA


LUNGS: CTAB


HEART: RRR, no murmurs


ABD: Obese abdomen with dressed wound in the lower  abdomen. Soft with no 

rebound tenderness.


EXTREMITY: No edema


SKIN: No rashes, no jaundice


NEURO/PSYCH: A & O 3





A/P:





A 58 years old female patient with complex medical and surgical history 

including metabolic syndrome with obesity, hypertension, obstructive sleep apnea

, lung cancer ( reportedly) and NAFLD. Patient had complicated colonoscopy with 

perforation and ended up having multiple exploratory laparotomies with small 

bowel resection, ileostomy that was subsequently reversed at . Her surgical 

course was complicated by extensive adhesions incisional hernia that required 

further surgical interventions including adhesiolysis and hernia repair with 

soft tissues flap. GI consulted with concern of acute pancreatitis.Imaging of 

the abdomen notable for postsurgical changes along with enlarged pancreas with 

increased density is seen within the adjacent fat concerning for  acute 

pancreatitis.There was also a 3.1 cm low-attenuation area is seen lateral to 

the head of the pancreas that was presumed to be related to represent a 

pancreatic pseudocyst.  Labs notable for normocytic anemia and trending down 

liver function tests.





Recommendations:


- Advance diet as tolerated.


- Follow up imaging of the abdomen after 6-8 weeks.


- Continue with other supportive cares as per primary team


- Patient to be transferred to  as per surgery.


- GI available for any Q's.





Thank you for involving us in the care of this interesting patient.











JUAN SORIANO MD 2019 17:30

## 2019-02-18 NOTE — NUR
Wound Care

Pt seen for wound care and ostomy consultation  re: a possible fistula and peristomal skin 
breakdown. Pt's dressings removed, ABDs saturated with liquid stool. Both areas on abdomen 
have a stoma-like appearance, but pt stated the lateral opening is the Ileostomy, that isn't 
producing stool and the medial opening is the fistula, and this is where all the liquid 
stool is draining.  Skin around stoma and fistula cleaned with water and washcloths, skin 
prep and stoma powder applied (crusting), to help soothe and heal the denuded skin, then a 
4" Ostomy ring was placed around each protruding opening, then a wound drainage bag was 
placed over the fistula, and a convex ostomy bag was placed over the Ileostomy. Hydrocolloid 
strips were then placed around outer edge of flanges. Photographs taken for floor RNs to 
reference, should the bags leak or do not maintain the seal. POC discussed with KYRIE Zhang 
re: skin care, and diligence of needing to drain bags at least hourly, RN v/u. No other 
wounds noted on full skin inspection, but pt's linens, gown and chux were soaked with urine, 
when asked, pt stated she does not always know when she needs to go. Discussed with KYRIE Zhang the possibility of placing the external catheter. Heels floated on pillows, will f/u with 
pt tomorrow to reassess abdomen.

## 2019-02-18 NOTE — NUR
Pharmacy Vancomycin Dosing Note



S:Consulted to monitor and dose vancomycin started 02/15/19.



O:VAL NOLASCO is a 58 year old F with 

Cellulitis PANCREATITIS .



Height: 5 feet, 4 inches

Weight: 120.697052 kg

Ideal Body Weight: 54.70 

Adjusted Body Weight: 81.22 

Dosing Weight: Actual



Other Antibiotics: 

CIPRO X 1, MNZ X 1 2/15



LABS:

Last BUN: 13 

Last Creatinine: 0.9 

Creatinine Clearance: 88 mL/min

Last WBC: 7.4 

Last Procalcitonin: N/A 

Tmax (past 24 hours): 98.7 



Microbiology: 

2/15 NONE



I/O: 460/- 



Drug Levels:

Last Trough level: 16.7  on 02/18/19 at 0230 

Last dose given 02/15/19 at 1800 



Vancomycin Dosing:

Loading Dose: 1500 mg x1

Dosing Weight: Actual

Target Trough: 10-20





A: Based on: TROUGH





P: 1. Continue Vancomycin 1750 mg IV q18h

   2. Follow up Trough level IF NEEDED

   3. Pharmacy will continue to monitor, follow and adjust therapy as needed.

 

 EMI ELAM RPH, 02/18/19 0514

-------------------------------------------------------------------------------

Signed:    02/18/19 at 0514 by EMI ELAM RPH PHA

-------------------------------------------------------------------------------

## 2019-02-18 NOTE — NUR
SW following for discharge planning. Discussed with RN, pt is from [facility] and re[facility] 
and Physician is working on reaching out to pt's [facility] surgeon for a potential transfer 
request. FLAKO spoke with Elaine Mcelroy at [facility] and confirmed pt was SNU resident and is able 
to return once medically stable pending insurance approval. Elaine also provided FLAKO with pt's 
insurance information. FLAKO E-mailed registration regarding pt's insurance. Will continue to 
follow.

## 2019-02-18 NOTE — NUR
Chart review done. Spoke w/ RN who indicated pt having constant leaking 

from fistula and would not be appropriate for rehab at this time. Spoke w/ 

pt who varified that she has been ad [facility] for rehab w/ 

ultimate goal to return home. Recommend PT/OT Eval and treat when leakage 

stabilizes. 


-------------------------------------------------------------------------------

Addendum: 02/18/19 at 1012 by KIRK MERRILL OT

-------------------------------------------------------------------------------

Amended: Links added.

## 2019-02-18 NOTE — PDOC
Infectious Disease Note


Subjective


Subjective


Feeling pretty good


No further N/V


Efren F/C/S/SOA





ROS


ROS


o/w neg





Vital Sign


Vital Signs





Vital Signs








  Date Time  Temp Pulse Resp B/P (MAP) Pulse Ox O2 Delivery O2 Flow Rate FiO2


 


2/18/19 07:00 97.6 87 18 95/60 (72) 97 Nasal Cannula 2.0 





 97.6       











Physical Exam


PHYSICAL EXAM


GENERAL:  Propped up in bed, relaxed appearance, smiling 


HEENT:  Pupils are equally round.  Normal conjunctivae.  Oral cavity:  Pharynx 

pink and moist.


NECK:  Supple.


LUNGS:  Clear to auscultation.


HEART:  S1, S2.


ABDOMEN:  Obese, soft and nontender with bowel sounds present.  She has a newly 

placed dressing in place


EXTREMITIES:  No gross edema or cyanosis.


SKIN:  Warm without generalized rash.


NEUROLOGIC:  Alert and oriented x 3


RUE-PICC (POA) without signs of any complications.





Labs


Lab





Laboratory Tests








Test


 2/17/19


12:17 2/18/19


03:25


 


Ammonia


 16 mcmol/L


(11-34) 





 


Sodium Level


 


 136 mmol/L


(136-145)


 


Potassium Level


 


 4.0 mmol/L


(3.5-5.1)


 


Chloride Level


 


 103 mmol/L


()


 


Carbon Dioxide Level


 


 26 mmol/L


(21-32)


 


Anion Gap  7 (6-14) 


 


Blood Urea Nitrogen


 


 10 mg/dL


(7-20)


 


Creatinine


 


 0.9 mg/dL


(0.6-1.0)


 


Estimated GFR


(Cockcroft-Gault) 


 64.5 





 


BUN/Creatinine Ratio  11 (6-20) 


 


Glucose Level


 


 112 mg/dL


(70-99)


 


Calcium Level


 


 8.6 mg/dL


(8.5-10.1)


 


Magnesium Level


 


 1.8 mg/dL


(1.8-2.4)


 


Total Bilirubin


 


 0.2 mg/dL


(0.2-1.0)


 


Aspartate Amino Transf


(AST/SGOT) 


 51 U/L (15-37) 





 


Alanine Aminotransferase


(ALT/SGPT) 


 55 U/L (14-59) 





 


Alkaline Phosphatase


 


 146 U/L


()


 


Total Protein


 


 6.7 g/dL


(6.4-8.2)


 


Albumin


 


 1.9 g/dL


(3.4-5.0)


 


Albumin/Globulin Ratio  0.4 (1.0-1.7) 


 


Vancomycin Level Trough


 


 16.7 mcg/mL


(10.0-20.0)


 


Vancomycin Last Dose Date  0216 


 


Vancomycin Last Dose Time  1800 








Micro


CT 2/15 IMPRESSION:


1. Postsurgical changes are seen as outlined above. There is no evidence 


of obstruction. No abscess is seen.


2. The head of the pancreas is enlarged. Increased density is seen within 


the adjacent fat. These findings likely reflect acute pancreatitis. A 3.1 


cm low-attenuation area is seen lateral to the head of the pancreas is 


felt to most represent a pancreatic pseudocyst.


3. Hepatosplenomegaly with fatty infiltration of the liver.





Objective


Assessment


Abdominal cellulitis 


Acute pancreatitis with ? pseudocyst 


EC fistula, on chronic TPN via PICC line since Aug 2018.  Now tolerating 

advance diet 


PCN allergy listed. PT says she's not though


Transaminitis - improving 


COPD 


Positive MRSA nares





Plan


Plan of Care


Cont vanc, meropenem and micafungin (2/16) for abd cellulitis - will not need 

for pancreatitis as there is no necrosis


One time dose Cipro and Flagyl in ER, 2/15 


Monitor labs/renal function closely


May need NPO and TPN with fistula


D/w Wound care team this am probably needs additional surgical revision


Anticipate cont IV abx to allow Cellulitis to resolve and then will need F/u if 

not transfer to TATYANA LAMBERT MD Feb 18, 2019 08:25

## 2019-02-19 NOTE — PDOC
Subjective:


Subjective:


Says she's better and left a message for her surgeon to call her back.





Objective:


Objective:


D/w RN - primary d/w surgeon at [facility] for NPO and TPN which pt doesn't want 

- pt to discuss w/ surgeon via phone.


Surgery is not following here?


On iron and PPI.





******


PAST SURGICAL HISTORY:


1.  On 07/30/2018, she underwent an exploratory laparotomy with extensive lysis 

of adhesions and ileostomy takedown a small bowel resection with open primary 

incisional hernia repair without mesh, soft tissue rearrangement, flap scar 

revision and a wound VAC placement by Dr. Celio Corcoran.


2.  On 07/30/2018, she underwent a primary incisional hernia repair with soft 

tissue rearrangement flaps measuring 40 x 20 cm with scar revision measuring 15 

x 2 cm and a placement of a wound VAC.


3.  On 07/30/2018, she underwent exploratory laparotomy with abdominal washout 

and resection of ileocolonic anastomosis and a wound VAC placed.


4.  On 08/09/2018, she underwent a reopening of a laparotomy incision with an 

abdominal washout, a small-bowel resection and ileostomy and wound VAC 

placement.


5.  On 08/24/2018, she underwent reopening of subcutaneous flap abdominal 

closure with abdominal washout and drainage of intraabdominal abscess, 

debridement of necrotic subcutaneous flaps and ileostomy revision with a small-

bowel resection and wound VAC placement.


6.  On 08/27/2018, she underwent an abdominal washout and drainage of 

intraabdominal abscess with a wound VAC placement.


7.  On 08/31/2018, she underwent an abdominal washout and drainage of an intra-

abdominal abscess with wound VAC placement.


8.  On 09/05/2018, she underwent an abdominal washout with the white foam wound 

VAC placement.


9.  On 09/10/2018,  she underwent an another abdominal washout with a wound VAC 

placement.


10.  On 09/10/2018, she underwent another abdominal washout with the wound VAC 

placement.


Vital Signs:





 Vital Signs








  Date Time  Temp Pulse Resp B/P (MAP) Pulse Ox O2 Delivery O2 Flow Rate FiO2


 


2/19/19 11:00 98.6 96 16 117/67 (84) 95 Nasal Cannula 2.0 





 98.6       








Imaging:


CT A/P


The absence of oral contrast material limits the study for the detection of 

bowel pathology.


Images through the lung bases demonstrate mild cardiomegaly. There is a small 

left pleural effusion. Left lower lobe atelectasis and/or infiltrate is seen.


The liver is mild to moderately enlarged measuring 23 cm in length. Decreased 

attenuation of the liver parenchyma is seen consistent with fatty infiltration. 

The spleen is mildly enlarged measuring 13.5 cm in length. Fullness of the left 

adrenal gland is seen. The patient appears to be post right adrenalectomy. No 

focal abnormality of the right kidney is noted. A 4 cm rounded low-attenuation 

lesion is seen involving the superior/midpole left kidney. This likely 

represents a cyst. The head of the pancreas is enlarged. Increased density is 

seen within the adjacent fat. These findings are consistent with pancreatitis. 

A somewhat rounded low-attenuation area is seen within the lateral aspect of 

the head of the pancreas which measures 3.1 cm in greatest diameter. This is 

felt to most likely represent a pancreatic pseudocyst. Atherosclerotic 

calcification of the abdominal aorta and its branches is noted. The abdominal 

aorta tapers normally. Surgical clips are seen in the region of the gallbladder 

fossa consistent with a cholecystectomy. The patient appears to be post right 

hemicolectomy. Multiple diverticula are seen involving the transverse, 

descending and sigmoid colon. No inflammatory changes are seen in the adjacent 

fat. A large ventral hernia is seen which contains the majority of the bowel. 

The hernia sac measures 32 cm transverse diameter. Surgical clips are seen 

within the right abdomen in the region of the ileum. Loops of ileum extend 

immediately adjacent to the anterior abdominal wall within the right lobe lower 

quadrant of the abdomen which appears to represent an ileostomy. There is no 

evidence of bowel obstruction. No free fluid or free air is seen within the 

abdomen. No abnormal fluid collection is seen to suggest evidence of an abscess.


Images through the pelvis demonstrate the urinary bladder to be contracted. A 

punctate calcification is seen within the left pelvis consistent with a 

phlebolith. No free fluid is seen. Minimal S-shaped curvature of the 

thoracolumbar spine is seen. Degenerative changes are seen involving the lower 

thoracic and throughout the lumbar spine and both hips.


IMPRESSION:


1. Postsurgical changes are seen as outlined above. There is no evidence of 

obstruction. No abscess is seen.


2. The head of the pancreas is enlarged. Increased density is seen within the 

adjacent fat. These findings likely reflect acute pancreatitis. A 3.1 cm low-

attenuation area is seen lateral to the head of the pancreas is felt to most 

represent a pancreatic pseudocyst.


3. Hepatosplenomegaly with fatty infiltration of the liver.





PE:





GEN: NAD, was asleep


LUNGS: CTAB


HEART: RRR


ABD: obese, excoriations and erythema, purulent drainage, RLQ ostomy


NEURO/PSYCH: A & O 3





A/P:


Pancreatitis w/ likely pseudocyst - lipase now WNL


Abd wounds/fistula


Ventral hernia containing bowel


Hepatosplenomegaly, fatty liver - LFTs improved


Diverticular disease


Multiple abd surgeries


Anemia





--


Awaiting discussion w/ [facility] surgeon.











SAL RIVERS Feb 19, 2019 13:31

## 2019-02-19 NOTE — NUR
Wound Care

Pt seen for f/u from yesterday, to check bags for leakage. Sabrina and Jose A, RNs at 
bedside, stating bags have just begun to leak. Bags removed, periwound and stomal skin 
cleaned with water and washcloths, skin appears much less irritated, but still with a large 
amount of denuded skin to periwound. Skin prep and stoma powder (crusting) applied, then a 
4" ostomy ring over each opening, then a large wound drainage bag was applied over both 
openings, good seal achieved, pt tolerated well. Will f/u tomorrow to reassess.

## 2019-02-19 NOTE — PN
DATE:  02/19/2019



SUBJECTIVE:  The patient is resting, slightly propped up in bed, in no apparent

distress.  Denied any complaint; however, she was very unhappy when I spoke to

her, when I informed her that I spoke with Dr. Corcoran who recommended going back

on TPN and restricting her intake to 1500 mL of clear liquid as her abdominal

wall is very irritated and macerated with the increase in her output from her

fistula.



PHYSICAL EXAMINATION:

GENERAL:  On examining her, she looked pale, but no jaundice, cyanosis or

thyromegaly.  No jugular venous distension.  No lower limb edema.

VITAL SIGNS:  Her heart rate was 102, blood pressure was 129/66, temperature was

98.1, respiratory rate was 16 and oxygen saturation was 96% on 2 liters of

oxygen.

HEAD, EYES, EARS, NOSE AND THROAT:  Showed normocephalic and atraumatic.

NECK:  Supple.

HEART:  Showed normal first and second heart sounds.  No gallop, rub or murmur.

CHEST:  Clear to auscultation.  No crepitation or rhonchi.

ABDOMEN:  Distended and soft.  Very macerated skin over the abdominal wall.  No

tenderness.  No guarding or rigidity.  No organomegaly.  All hernial orifices

intact.  Bowel sounds normal.

NEUROLOGIC:  She is awake, alert and responding appropriately.  All her cranial

nerves are intact.  She moves extremities without difficulty.



Her intake was 2200 and no output was recorded.



LABORATORY DATA:  As of yesterday showed serum sodium of 136, potassium 4,

chloride 103, bicarbonate 26, anion gap of 7, BUN 10 and creatinine 0.9.  Her

hemoglobin was 9 and hematocrit 30 with normal white cell count and platelets.



ASSESSMENT:

1.  Acute pancreatitis, questionable with pseudocyst on CT scan.  Her serum

amylase is now within normal limits.

2.  Abdominal wall cellulitis for which she is on multiple antibiotics including

meropenem, vancomycin and micafungin.

3.  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

regular diet with marked excoriation and maceration of her abdominal wall.  I

informed the patient that Dr. Corcoran would like her to go back on TPN.  She

seemed to be very reluctant to do that and would like to talk to him.  We will

await the outcome of the discussion to decide on further management accordingly.

 



______________________________

RAISA HURLEY MD



DR:  ALIYA/chapincito  JOB#:  [id number] / [id number]

DD:  02/19/2019 09:36  DT:  02/19/2019 11:02

## 2019-02-19 NOTE — NUR
RN NOTE: 2320 morphine administration did not save into the computer. This nurse had to 
manually enter the administration of the medication.

## 2019-02-19 NOTE — PDOC
Infectious Disease Note


Subjective


Subjective


Feeling pretty good but discouraged with having to be NPO again


No further N/V


Efren F/C/S/SOA





Vital Sign


Vital Signs





Vital Signs








  Date Time  Temp Pulse Resp B/P (MAP) Pulse Ox O2 Delivery O2 Flow Rate FiO2


 


2/19/19 07:10 98.1 102 16 129/66 (87) 96 Nasal Cannula 2.0 





 98.1       











Physical Exam


PHYSICAL EXAM


GENERAL:  Propped up in bed, relaxed appearance, smiling - NAD


HEENT:  Pupils are equally round.  Normal conjunctivae.  Oral cavity:  Pharynx 

pink and moist.


NECK:  Supple.


LUNGS:  Clear to auscultation.


HEART:  S1, S2.


ABDOMEN:  Obese, soft and nontender with bowel sounds present. Some mild 

leakage around lat ostomy dressing with some skin irritation


EXTREMITIES:  No gross edema or cyanosis.


SKIN:  Warm without generalized rash.


NEUROLOGIC:  Alert and oriented x 3


RUE-PICC (POA) without signs of any complications.





Labs


Micro


CT 2/15 IMPRESSION:


1. Postsurgical changes are seen as outlined above. There is no evidence 


of obstruction. No abscess is seen.


2. The head of the pancreas is enlarged. Increased density is seen within 


the adjacent fat. These findings likely reflect acute pancreatitis. A 3.1 


cm low-attenuation area is seen lateral to the head of the pancreas is 


felt to most represent a pancreatic pseudocyst.


3. Hepatosplenomegaly with fatty infiltration of the liver.





Objective


Assessment


Abdominal cellulitis - 


Acute pancreatitis with ? pseudocyst 


EC fistula, h/o TPN via PICC line since Aug 2018.


PCN allergy listed. PT says she's not though


Transaminitis - improving 


COPD 


Positive MRSA nares





Plan


Plan of Care


Cont vanc, meropenem and micafungin (2/16) for abd cellulitis - will not need 

for pancreatitis as there is no necrosis. Cont abx until eval at 


One time dose Cipro and Flagyl in ER, 2/15 


Monitor labs/renal function closely


To be NPO


D/w Wound care team this am probably needs additional surgical revision


Cont IV abx to allow Cellulitis to resolve and then will need to F/u at 





D/w Dr. Chacon





O to transfer if arrangements can be made





D/w nursing











TATYANA ROSENBAUM MD Feb 19, 2019 09:10

## 2019-02-20 NOTE — PN
DATE:  02/20/2019



SUBJECTIVE:  The patient is resting, slightly propped up in bed, in no apparent

distress.  She seemed to be in good spirits today.  I agree to limit her oral

intake and going to go back on TPN.  She continues to be on vancomycin and

meropenem as well as anidulafungin.  The abdominal wound seems to be much

improved and the wound care found a better ostomy bag to seal the area.



PHYSICAL EXAMINATION:

GENERAL:  When I examined her, she looked well and was clearly in no apparent

respiratory distress.  No pallor, jaundice, cyanosis, or thyromegaly.  No

jugular venous distension.  No limb edema.

VITAL SIGNS:  Her heart rate was 82, blood pressure was 133/78, temperature was

97.9, respiratory rate was 16, and oxygen saturation of 95%.

HEAD, EYES, EARS, NOSE AND THROAT:  Normocephalic, atraumatic.

NECK:  Supple.

HEART:  Showed normal first and second heart sounds.  No gallop, rub or murmur.

CHEST:  Clear to auscultation.  No crepitation or rhonchi.

ABDOMEN:  Distended, soft, nontender.  She has obviously the ileostomy and the

enterocutaneous fistula with marked erythema and maceration in the abdominal

wall.

NEUROLOGIC:  She is awake, alert, responding appropriately.  All cranial nerves

intact.  She moves extremities without difficulty.



Her intake over the last 24-hour is 1115, output not recorded.



LABORATORY DATA:  As of this morning showed a white cell count 7200, hemoglobin

9.5, hematocrit 30, MCV 80, and platelet count of 361,000.  Her chemistry showed

a serum sodium 140, potassium 4, chloride 105, bicarbonate 27, anion gap of 8,

BUN 9, creatinine 1, estimated GFR was 57 mL per minute.  Her glucose was 99,

calcium was 8.7.  Phosphorus 3.7, magnesium was 1.4.  Total bilirubin, AST and

ALT are normal.  Alkaline phosphatase slightly elevated.  Total protein was 6.8,

albumin 1.8.  Her triglycerides and lipase were normal.  Her vancomycin trough

level was 16.7.



ASSESSMENT:

1.  Acute pancreatitis with possible pseudocyst on the CT scan.

2.  Abdominal wall cellulitis, for which she is on multiple antibiotics

including meropenem, vancomycin and anidulafungin.

3.  Enterocutaneous fistula with increased output, for which she was started on

a regular diet and about a week ago, I did speak with Dr. Corcoran who recommended

that she should go back on TPN and that she should limit her intake to about 500

mL of liquid diet.

4.  I would consult the  to see whether the [facility] can take care of her given all the new antibiotics.  The only other

option available is for her to go back to [facility].

 



______________________________

RAISA HURLEY MD



DR:  ALIYA/chapincito  JOB#:  [id number] / [id number]

DD:  02/20/2019 10:48  DT:  02/20/2019 16:16

## 2019-02-20 NOTE — NUR
[facility] ADMINISTERED LATE, R/T NOT ON FLOOR AT TIME DUE. CALLED PHARMACY FOR UPDATE ON MED 
LOCATION. PT WAS WORKING WITH PT WHEN IT ARRIVED, APPLIED LATE.

## 2019-02-20 NOTE — PDOC
Infectious Disease Note


Subjective


Subjective


Feeling pretty good but discouraged with having decreased po


No further N/V


Efren F/C/S/SOA





Vital Sign


Vital Signs





Vital Signs








  Date Time  Temp Pulse Resp B/P (MAP) Pulse Ox O2 Delivery O2 Flow Rate FiO2


 


2/20/19 07:00 97.9 101 16 103/61 (75) 94 Nasal Cannula 2.0 





 97.9       











Physical Exam


PHYSICAL EXAM


GENERAL:  Propped up in bed, relaxed appearance, smiling - NAD


HEENT:  Pupils are equally round.  Normal conjunctivae.  Oral cavity:  Pharynx 

pink and moist.


NECK:  Supple.


LUNGS:  Clear to auscultation.


HEART:  S1, S2.


ABDOMEN:  Obese, soft and nontender with bowel sounds present. Some min leakage 

around lat ostomy dressing with some skin irritation - but improved


EXTREMITIES:  No gross edema or cyanosis.


SKIN:  Warm without generalized rash.


NEUROLOGIC:  Alert and oriented x 3


RUE-PICC (POA) without signs of any complications.





Labs


Lab





Laboratory Tests








Test


 2/20/19


07:23


 


White Blood Count


 7.2 x10^3/uL


(4.0-11.0)


 


Red Blood Count


 3.87 x10^6/uL


(3.50-5.40)


 


Hemoglobin


 9.5 g/dL


(12.0-15.5)


 


Hematocrit


 30.9 %


(36.0-47.0)


 


Mean Corpuscular Volume 80 fL () 


 


Mean Corpuscular Hemoglobin 25 pg (25-35) 


 


Mean Corpuscular Hemoglobin


Concent 31 g/dL


(31-37)


 


Red Cell Distribution Width


 19.2 %


(11.5-14.5)


 


Platelet Count


 361 x10^3/uL


(140-400)


 


Neutrophils (%) (Auto) 71 % (31-73) 


 


Lymphocytes (%) (Auto) 16 % (24-48) 


 


Monocytes (%) (Auto) 7 % (0-9) 


 


Eosinophils (%) (Auto) 6 % (0-3) 


 


Basophils (%) (Auto) 1 % (0-3) 


 


Neutrophils # (Auto)


 5.1 x10^3uL


(1.8-7.7)


 


Lymphocytes # (Auto)


 1.2 x10^3/uL


(1.0-4.8)


 


Monocytes # (Auto)


 0.5 x10^3/uL


(0.0-1.1)


 


Eosinophils # (Auto)


 0.4 x10^3/uL


(0.0-0.7)


 


Basophils # (Auto)


 0.1 x10^3/uL


(0.0-0.2)


 


Sodium Level


 140 mmol/L


(136-145)


 


Potassium Level


 4.0 mmol/L


(3.5-5.1)


 


Chloride Level


 105 mmol/L


()


 


Carbon Dioxide Level


 27 mmol/L


(21-32)


 


Anion Gap 8 (6-14) 


 


Blood Urea Nitrogen 9 mg/dL (7-20) 


 


Creatinine


 1.0 mg/dL


(0.6-1.0)


 


Estimated GFR


(Cockcroft-Gault) 57.1 





 


BUN/Creatinine Ratio 9 (6-20) 


 


Glucose Level


 99 mg/dL


(70-99)


 


Calcium Level


 8.7 mg/dL


(8.5-10.1)


 


Phosphorus Level


 3.7 mg/dL


(2.6-4.7)


 


Magnesium Level


 1.4 mg/dL


(1.8-2.4)


 


Total Bilirubin


 0.2 mg/dL


(0.2-1.0)


 


Aspartate Amino Transf


(AST/SGOT) 36 U/L (15-37) 





 


Alanine Aminotransferase


(ALT/SGPT) 39 U/L (14-59) 





 


Alkaline Phosphatase


 142 U/L


()


 


Total Protein


 6.8 g/dL


(6.4-8.2)


 


Albumin


 1.8 g/dL


(3.4-5.0)


 


Albumin/Globulin Ratio 0.4 (1.0-1.7) 


 


Triglycerides Level


 154 mg/dL


(0-150)


 


Lipase


 187 U/L


()








Micro


CT 2/15 IMPRESSION:


1. Postsurgical changes are seen as outlined above. There is no evidence 


of obstruction. No abscess is seen.


2. The head of the pancreas is enlarged. Increased density is seen within 


the adjacent fat. These findings likely reflect acute pancreatitis. A 3.1 


cm low-attenuation area is seen lateral to the head of the pancreas is 


felt to most represent a pancreatic pseudocyst.


3. Hepatosplenomegaly with fatty infiltration of the liver.





Objective


Assessment


Abdominal cellulitis - better


Acute pancreatitis with ? pseudocyst 


EC fistula, h/o TPN via PICC line since Aug 2018.


PCN allergy listed. PT says she's not though


Transaminitis - improving 


COPD 


Positive MRSA nares





Plan


Plan of Care


Cont vanc, meropenem and micafungin (2/16) for abd cellulitis - will not need 

for pancreatitis as there is no necrosis. Cont abx until eval at 


One time dose Cipro and Flagyl in ER, 2/15 


Monitor labs/renal function closely


To be NPO


D/w Wound care team this am probably needs additional surgical revision


Cont IV abx to allow Cellulitis to resolve and then will need to F/u at . She 

states appointment has been arranged for next week





D/w Dr. Oswaldo MISHRA to transfer today if arrangements can be made





D/w nursing











TATYANA ROSENBAUM MD Feb 20, 2019 10:09

## 2019-02-20 NOTE — NUR
FLAKO following for discharge planning. Discussed with RN. FLAKO contacted [facility] (ph: 
[phone], fax: [phone]) to determine if pt could return on IV abx. Dev advised 
this was possible. FLAKO awaiting PT/OT notes to send so Dev can submit for insurance 
approval. FLAKO faxed updates to [facility]. RN notified. FLAKO will continue to 
follow.

## 2019-02-20 NOTE — NUR
MORN FENTANYL PATCH GIVEN LATE AS MED WAS NOT SHOWING IN EMAR UNTIL LATER IN DAY.  MED 
SHOWED ON UP ON STATUS BOARD AS NEXT SCHEDULED, BUT NOT IN EMAR.

## 2019-02-20 NOTE — NUR
Wound/Ostomy Care

Wound Care

Pt seen for f/u from previous 2 days, to check bags for leakage. Sabrina and Tera, RNs 
stating bag has just begun to leak in lower corner. Bags removed, periwound and stomal skin 
cleaned with water and washcloths, skin appears much less irritated even from just 
yesterday, but still with a moderate amount of denuded skin to periwound. Skin prep and 
stoma powder (crusting) applied, then a 4" ostomy ring over each opening, then a large wound 
drainage bag was applied over both openings, good seal achieved, pt tolerated well. Upon 
full skin inspection, pt has reddened, macerated, yeasty areas to pannus folds, areas 
cleaned and dried, Dr. Chacon approved for Nystatin powder. No other wounds found on full skin 
inspection. Will f/u tomorrow to reassess.

## 2019-02-20 NOTE — PDOC
Objective:


Objective:


Wound care present.


Reviewed w/ RNs - abd looks better, plans for TPN (?and PO intake), possible DC 

tomorrow.


Vital Signs:





 Vital Signs








  Date Time  Temp Pulse Resp B/P (MAP) Pulse Ox O2 Delivery O2 Flow Rate FiO2


 


2/20/19 10:25 97.9 82 16 123/78 (93) 95 Room Air  





 97.9       


 


2/20/19 10:16       2.0 








Labs:





Laboratory Tests








Test


 2/20/19


07:23


 


White Blood Count 7.2 x10^3/uL 


 


Red Blood Count 3.87 x10^6/uL 


 


Hemoglobin 9.5 g/dL 


 


Hematocrit 30.9 % 


 


Mean Corpuscular Volume 80 fL 


 


Mean Corpuscular Hemoglobin 25 pg 


 


Mean Corpuscular Hemoglobin


Concent 31 g/dL 





 


Red Cell Distribution Width 19.2 % 


 


Platelet Count 361 x10^3/uL 


 


Neutrophils (%) (Auto) 71 % 


 


Lymphocytes (%) (Auto) 16 % 


 


Monocytes (%) (Auto) 7 % 


 


Eosinophils (%) (Auto) 6 % 


 


Basophils (%) (Auto) 1 % 


 


Neutrophils # (Auto) 5.1 x10^3uL 


 


Lymphocytes # (Auto) 1.2 x10^3/uL 


 


Monocytes # (Auto) 0.5 x10^3/uL 


 


Eosinophils # (Auto) 0.4 x10^3/uL 


 


Basophils # (Auto) 0.1 x10^3/uL 


 


Sodium Level 140 mmol/L 


 


Potassium Level 4.0 mmol/L 


 


Chloride Level 105 mmol/L 


 


Carbon Dioxide Level 27 mmol/L 


 


Anion Gap 8 


 


Blood Urea Nitrogen 9 mg/dL 


 


Creatinine 1.0 mg/dL 


 


Estimated GFR


(Cockcroft-Gault) 57.1 





 


BUN/Creatinine Ratio 9 


 


Glucose Level 99 mg/dL 


 


Calcium Level 8.7 mg/dL 


 


Phosphorus Level 3.7 mg/dL 


 


Magnesium Level 1.4 mg/dL 


 


Total Bilirubin 0.2 mg/dL 


 


Aspartate Amino Transf


(AST/SGOT) 36 U/L 





 


Alanine Aminotransferase


(ALT/SGPT) 39 U/L 





 


Alkaline Phosphatase 142 U/L 


 


Total Protein 6.8 g/dL 


 


Albumin 1.8 g/dL 


 


Albumin/Globulin Ratio 0.4 


 


Triglycerides Level 154 mg/dL 


 


Lipase 187 U/L 











PE:





GEN: NAD - wound care present


NEURO/PSYCH: A & O 3





A/P:


Pancreatitis w/ likely pseudocyst - lipase now WNL


Abd wounds





--


Will review additional recs re: pseudocyst (like interval imaging) w/ Dr. Nunez.











SAL RIVERS Feb 20, 2019 11:07

## 2019-02-20 NOTE — NUR
Pharmacy TPN Dosing Note



S: VAL NOLASCO is a 58 year old F Currently receiving Central Continuous TPN started 
02/20/19



B:Pertinent PMH: 

PANCREATITIS, HIGH OUTPUT FROM FISTULA

Height: 5 feet, 4 inches

Weight: 125.816858 kg



Current diet: REGULAR 



LABS:

Sodium:    140 

Potassium: 4.0 

Chloride:  105 

Calcium:   8.7 

Corrected Calcium: 10.46 

Magnesium: 1.4 

CO2:       27 

SCr:       1.0 

Glucose:   99 

Albumin:   1.8 

AST:       36 

ALT:       39 



TPN FORMULA:

TPN TYPE:  Central Continuous

AMINO ACIDS:         60 gm

DEXTROSE:            195 gm

LIPIDS:              20 gm

SODIUM CHLORIDE:     90 mEq

SODIUM ACETATE:      mEq

SODIUM PHOSPHATE:    mmol

POTASSIUM CHLORIDE:  50 mEq

POTASSIUM ACETATE:   mEq

POTASSIUM PHOSPHATE: 13.6 mmol

MAGNESIUM:           18 mEq

CALCIUM:             10 mEq

INSULIN:             units

MULTIPLE VITAMIN:    10 ml

TRACE ELEMENTS:      MTE5 1 ML ml(s)



TPN PLAN:  

Pt with complicated surgical history/abdominal anatomy with history of EC

fistula and previous TPN. Now having high output per fistula with

maceration, need for gut rest and TPN. Labs mostly WNL except mag 1.4,

repleted with 2 g per primary. Started standard TPN with 18 meq magnesium

instead of 10 meq. Await dietary rec. Trig 154, appropriate for lipid

component. BMP, phos, mag in AM.





R: Begin TPN AS ABOVE.

Will monitor electrolytes, glucose, and tolerance to TPN.



 SATURNINO DUKE [facility], 02/20/19 1373

## 2019-02-21 NOTE — NUR
FLAKO following pt. FLAKO phoned and faxed referral to [facility]. FLAKO also faxed updates to 
[facility] care and re[facility] and left a message with Elaine regarding TPN. Pt admission and 
acceptance pending. Will continue to follow.

-------------------------------------------------------------------------------

Addendum: 02/21/19 at 1625 by ANIA ARRIOLA

-------------------------------------------------------------------------------

FLAKO following pt. [facility] has accepted pt pending insurance approval. Elaine at [facility] reported 
they are able to do TPN but is not sure if they can accommodate three IV ABX. Auth for 
select pending. Will continue to follow.

## 2019-02-21 NOTE — NUR
Wound Care

Pt seen for f/u to check bags for leakage. Patient assisted back to bed and Bags removed, 
periwound and stomal skin cleansed with water and washcloths, skin appears much less 
irritated, but still with a large amount of denuded skin to periwound. Skin prep and stoma 
powder (crusting) applied, then a 4" ostomy ring over each opening, then a large wound 
drainage bag was applied over both openings, good seal achieved, pt tolerated well. Will f/u 
tomorrow to reassess. Call light in reach and bed lowered.

## 2019-02-21 NOTE — PDOC
Subjective:


Subjective:


Tolerating PO.





Objective:


Objective:


Reviewed w/ RN, reviewed chart - pending DC to facility.


Vital Signs:





 Vital Signs








  Date Time  Temp Pulse Resp B/P (MAP) Pulse Ox O2 Delivery O2 Flow Rate FiO2


 


2/21/19 08:31     98 Nasal Cannula 2.0 


 


2/21/19 07:00 97.9 99 20 146/87 (106)    





 97.9       











PE:





GEN: NAD


LUNGS: room air


NEURO/PSYCH: A & O 3





A/P:


Pancreatitis w/ ?pseudocyst


Cellulitis, h/o multiple abd surgeries - follows @ [facility]





--


DC per primary.


Follow-up w/ [facility]/surgeon.


Recommend interval CT in ~4 weeks re: pancreas.











SAL RIVERS Feb 21, 2019 11:25

## 2019-02-21 NOTE — PDOC
Infectious Disease Note


Subjective


Subjective


Feeling better


No further N/V


Efren F/C/S/SOA





Vital Sign


Vital Signs





Vital Signs








  Date Time  Temp Pulse Resp B/P (MAP) Pulse Ox O2 Delivery O2 Flow Rate FiO2


 


2/21/19 08:31     98 Nasal Cannula 2.0 


 


2/21/19 07:00 97.9 99 20 146/87 (106)    





 97.9       











Physical Exam


PHYSICAL EXAM


GENERAL:  Propped up in bed, relaxed appearance, smiling - NAD


HEENT:  Pupils are equally round.  Normal conjunctivae.  Oral cavity:  Pharynx 

pink and moist.


NECK:  Supple.


LUNGS:  Clear to auscultation.


HEART:  S1, S2.


ABDOMEN:  Obese, soft and nontender with bowel sounds present. Some min leakage 

around lat ostomy dressing with some skin irritation - but improving more


EXTREMITIES:  No gross edema or cyanosis.


SKIN:  Warm without generalized rash.


NEUROLOGIC:  Alert and oriented x 3


RUE-PICC (POA) without signs of any complications.





Labs


Micro


CT 2/15 IMPRESSION:


1. Postsurgical changes are seen as outlined above. There is no evidence 


of obstruction. No abscess is seen.


2. The head of the pancreas is enlarged. Increased density is seen within 


the adjacent fat. These findings likely reflect acute pancreatitis. A 3.1 


cm low-attenuation area is seen lateral to the head of the pancreas is 


felt to most represent a pancreatic pseudocyst.


3. Hepatosplenomegaly with fatty infiltration of the liver.





Objective


Assessment


Abdominal cellulitis - better


Acute pancreatitis with ? pseudocyst 


EC fistula, h/o TPN via PICC line since Aug 2018.


PCN allergy listed. PT says she's not though


Transaminitis - improving 


COPD 


Positive MRSA nares





Plan


Plan of Care


Cont vanc, meropenem and micafungin (2/16) for abd cellulitis - will not need 

for pancreatitis as there is no necrosis. Cont abx until eval at 


One time dose Cipro and Flagyl in ER, 2/15 


Monitor labs/renal function closely


To be NPO


D/w Wound care team this am probably needs additional surgical revision


Cont IV abx to allow Cellulitis to resolve and then will need to F/u at . She 

states appointment has been arranged for next week





D/w Dr. Chacon





O to transfer today if arrangements can be made Insurance review in progress





D/w nursing











TATYANA ROSENBAUM MD Feb 21, 2019 10:53

## 2019-02-21 NOTE — NUR
Pharmacy TPN Dosing Note



S: VAL NOLASCO is a 58 year old F Currently receiving Central Continuous TPN started 
02/20/19



B:Pertinent PMH: PANCREATITIS, HIGH OUTPUT FROM FISTULA

Height: 5 feet, 4 inches

Weight: 125.712109 kg



Current diet: REGULAR 



LABS:

Sodium:    144 

Potassium: 4.0 

Chloride:  107 

Calcium:   8.2 

Corrected Calcium: 9.96 

Magnesium: 2 

CO2:       30 

SCr:       0.9 

Glucose:   121 

Albumin:   1.8 

AST:       36 (2/21) 

ALT:       39 (2/21) 



TPN FORMULA:

TPN TYPE:  Central Continuous

AMINO ACIDS:         60 gm

DEXTROSE:            195 gm

LIPIDS:              20 gm

SODIUM CHLORIDE:     55 mEq

SODIUM ACETATE:      - mEq

SODIUM PHOSPHATE:    - mmol

POTASSIUM CHLORIDE:  50 mEq

POTASSIUM ACETATE:   - mEq

POTASSIUM PHOSPHATE: 13.6 mmol

MAGNESIUM:           12 mEq

CALCIUM:             10 mEq

INSULIN:             - units

MULTIPLE VITAMIN:    10 ml

TRACE ELEMENTS:      MTE5 1 ML ml(s)



TPN PLAN:  

-Pt with complicated surgical history/abdominal anatomy with history of EC

fistula and previous TPN. Now having high output per fistula with

maceration, need for gut rest and TPN.

-Per Yolanda Gillette RD plan continue standard macros in TPN today.

-Mag up to 2 from 1.4. Mag 2 gram bolus given outside of TPN and Mag

increased in TPN. Plan to decrease Mag slightly to 12 today.

-Na and Chloride levels upper end of normal limit. Will decrease NaCl to 55

mEq/bag today.

-BMP, phos, mag in AM.





R: Continue TPN with adjustments made to NaCl and Magnesium

Will monitor electrolytes, glucose, and tolerance to TPN.



 JARON WOMACK, [facility], 02/21/19 [id number]

## 2019-02-21 NOTE — PN
DATE:  02/21/2019



SUBJECTIVE:  The patient is resting, slightly propped up, sleeping comfortably

in no apparent distress.  On questioning her, she denied any complaints.  The

nursing staff are concerned that her PICC line is not working, they cannot draw

blood from it and they flushed it, they used Cathflo and so far without much

success.  We are also waiting for her placement.  The  has faxed

the updates to [facility] as well as a referral to [facility].  We are waiting for acceptance by either one of these institutions.



PHYSICAL EXAMINATION:

GENERAL:  When I examined her this afternoon, she looked well and was clearly in

no apparent respiratory distress, pale, but no jaundice, cyanosis or

thyromegaly.  No jugular venous distension, no limb edema.

VITAL SIGNS:  Her heart rate was 99, blood pressure was 146/87, temperature was

97.9, respiratory rate 20, and oxygen saturation was 98% on 2 liters of oxygen.

HEAD, EYES, EARS, NOSE AND THROAT:  Showed normocephalic, atraumatic.

NECK:  Supple.

HEART:  Showed normal first and second heart sounds.  No gallop, rub or murmur.

CHEST:  Clear to auscultation.  No crepitation or rhonchi.

ABDOMEN:  Distended, soft.  She has an ileostomy and enterocutaneous fistula

with the area of the skin very macerated and raw, although it is much better

than when she came in with the surrounding erythema slowly improving.

NEUROLOGIC:  She was sleepy, but arousable.  All cranial nerves intact.  She

moves extremities without difficulty.



Her intake was 480, output was 2975.



LABORATORY DATA:  No lab works available as her PICC line is malfunctioning.



ASSESSMENT:

1.  Acute pancreatitis with possible pseudocyst on the CT scan.  Her lipase

normalized.

2.  Abdominal wall cellulitis, for which she is on multiple antibiotics

including meropenem, vancomycin, and anidulafungin.

3.  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

started recently on regular diet.  Dr. Corcoran, her colorectal surgeon

recommended that she should be on fluid restriction about 1500 mL and she is

back now on TPN.

4.  Other medical problems include morbid obesity, obstructive sleep apnea,

chronic obstructive pulmonary disease, generalized osteoarthritis.



PLAN:  Obviously, we are awaiting for placement.  We will try the Cathflo and if

her PICC line is not functioning, we have to place another one before we can

transfer her to either [facility] and/or [facility].

 



______________________________

RAISA HURLEY MD



DR:  ALIYA/chapincito  JOB#:  [id number] / [id number]

DD:  02/21/2019 10:31  DT:  02/21/2019 14:52

## 2019-02-22 NOTE — NUR
SW following. Spoke with Ginger at [facility] and Insurance has approved. [facility] will have a bed 
on Sunday. Please dc pt on Sunday to [facility]. RN notified.

## 2019-02-22 NOTE — NUR
Pharmacy Vancomycin Dosing Note



S: Consulted to monitor and dose vancomycin started 02/15/19.



O: VAL NOLASCO is a 58 year old F with abdominal wall cellulitis - improving.



Other Antibiotics:  

MERREM 500 MG IV Q6HRS

MICAFUNGIN 100 MG IV Q24HRS



LABS:

Last BUN: 11 

Last Creatinine: 0.8 

Creatinine Clearance: 100 mL/min

Last WBC: 7.2 

Last Platelets: 

Tmax (past 24 hours): 98.2 



Microbiology:  

2/15 NONE



I/O: 850/1800 



Drug Levels:

Last Trough level: 21.3 on 02/22/19 at 1430 

Last dose given 02/21/19 at 2100 



Vancomycin Dosing:

Dosing Weight: Actual

Target Trough: 10-20





A: Patient was receiving vancomycin 1750 mg IV q18hrs. A trough of 21.3 is above goal range. 
Renal function remains stable. Will hold vancomycin until tomorrow AM to allow drug 
clearance. Change to the following: 





P: 1. Change to Vancomycin 1750 mg IV q24h

    2. Follow up levels in 5 - 7 days if therapy persists 

    3. Pharmacy will continue to monitor, follow and adjust therapy as needed.

 

 OSCAR MOREL [facility], 02/22/19 [id number]

## 2019-02-22 NOTE — PN
DATE:  02/22/2019



SUBJECTIVE:  The patient is resting, slightly propped up in bed, in no apparent

respiratory distress.  She is sleepy, but arousable.  On questioning her, she

complained of more abdominal pain ____ of this ostomy bag.  The area is covered

with dressing; however, her PICC line is functioning now and she continues to be

on TPN and antibiotics.  We are awaiting the decision by the [facility] to see whether they can take care of her, otherwise, we will transfer

her to [facility].



PHYSICAL EXAMINATION:

GENERAL:  When I examined her, she looked pale, no jaundice, cyanosis, or

thyromegaly.  No jugular venous distension.  No lower limb edema.

VITAL SIGNS:  Her heart rate was 99, blood pressure 125/71, temperature was

98.2, respiratory rate was 18 and oxygen saturation was 96% on 2 liters of

oxygen.

Rest of clinical exam is stable, has not really changed.



Her intake over the last 24 hours was 1150, output was 500.



LABORATORY DATA:  This morning showed serum sodium 145, potassium 4.1, chloride

110, bicarbonate 30, anion gap of 5, BUN 11, creatinine 0.9, estimated GFR was

74 mL per minute.  Her glucose 103, calcium was 8.4.



ASSESSMENT:

1.  Acute pancreatitis with possible pseudocyst on CT scan.  Her lipase has

normalized.

2.  Abdominal wall cellulitis for which she is on multiple antibiotics including

meropenem, vancomycin, anidulafungin.

3.   Enterocutaneous fistula and ileostomy with increased output after she was

started recently on a regular diet.  Dr. Corcoran, her colorectal surgeon

recommended that she should be on fluid restriction and she is to go back on her

TPN.

4.  Other medical problems include:

a.  Morbid obesity with obstructive sleep apnea.

b.  Chronic obstructive pulmonary disease.

c.  Generalized osteoarthritis.



PLAN:  To continue with current plan of management.  Await decision by the

[facility] and/or [facility].

 



______________________________

RAISA HURLEY MD



DR:  ALIYA/chapincito  JOB#:  [id number] / [id number]

DD:  02/22/2019 10:27  DT:  02/22/2019 21:02

## 2019-02-22 NOTE — NUR
Pharmacy TPN Dosing Note



S: VAL NOLASCO is a 58 year old F Currently receiving Central Continuous TPN started 
02/20/19



B:Pertinent PMH: PANCREATITIS, HIGH OUTPUT FROM FISTULA



Height: 5 feet, 4 inches

Weight: 125.7 kg



Current diet: REGULAR 



LABS:

Sodium:    145 

Potassium: 4.1 

Chloride:  110 

Calcium:   8.4 

Corrected Calcium: 10.16 

Magnesium: 2.3 

CO2:       30 

SCr:       0.8 

Glucose:   103 

Albumin:   1.8 

AST:       36 (2/21) 

ALT:       39 (2/21) 



TPN FORMULA:

TPN TYPE:  Central Continuous

AMINO ACIDS:         90 gm

DEXTROSE:            220 gm

LIPIDS:              40 gm

SODIUM CHLORIDE:     20 mEq

POTASSIUM CHLORIDE:  50 mEq

POTASSIUM PHOSPHATE: 13.6 mmol

MAGNESIUM:           12 mEq

CALCIUM:             5 mEq

MULTIPLE VITAMIN:    10 ml

TRACE ELEMENTS:      MTE5 1 ml



TPN PLAN:  

-Notified of fluid restriction today by RD - 1500 ml/day; will reduce rate as able.

-Macros increased per dietary recs.

-Reduce NaCl due to fluid reduction.

-Reduce calcium to allow for fluid reduction (Ca/Phos ratio).

-BMP tomorrow.





R: Change TPN to 50 ml/hr and above formula.

Will monitor electrolytes, glucose, and tolerance to TPN.



 OSCAR MOREL [facility], 02/22/19 [id number]

## 2019-02-22 NOTE — NUR
Wound Care

Pt seen for f/u to check bags for leakage. Bags had already been removed d/t leakage, 
periwound and stomal skin cleansed with water and washcloths, skin appears much less 
irritated, but still with a large amount of denuded skin to periwound. Skin prep and stoma 
powder (crusting) applied, then 4" ostomy ring over each opening, then 2 urostomy bags 
applied and connected to Silva bags for dependent drainage was applied over both openings, 
good seal achieved, pt tolerated well. Will f/u Monday to reassess.  notified KYRIE Zhang 
about the POC.

## 2019-02-23 NOTE — NUR
Pharmacy TPN Dosing Note



S: VAL NOLASCO is a 58 year old F Currently receiving Central Continuous TPN started 
02/20/19



B:Pertinent PMH: 

PANCREATITIS, HIGH OUTPUT FROM FISTULA

Height: 5 feet, 4 inches

Weight: 125.889822 kg



Current diet: REGULAR 



LABS:

Sodium:    143 

Potassium: 4.3 

Chloride:  107 

Calcium:   8.4 

Corrected Calcium: 10.16 

Magnesium: 2.3 

CO2:       33 

SCr:       0.7 

Glucose:   97 

Albumin:   1.8 

AST:       36 (2/21) 

ALT:       39 (2/21) 



TPN FORMULA:

TPN TYPE:  Central Continuous

AMINO ACIDS:         90 gm

DEXTROSE:            220 gm

LIPIDS:              40 gm

SODIUM CHLORIDE:     20 mEq

SODIUM ACETATE:      mEq

SODIUM PHOSPHATE:    mmol

POTASSIUM CHLORIDE:  50 mEq

POTASSIUM ACETATE:   mEq

POTASSIUM PHOSPHATE: 13.6 mmol

MAGNESIUM:           12 mEq

CALCIUM:             5 mEq

INSULIN:             units

MULTIPLE VITAMIN:    10 ml

TRACE ELEMENTS:      MTE5 1 ML ml(s)



TPN PLAN:  

cont same tpn, pt will be transfer to [facility] tomorrow.





R: Continue TPN at 50 ml/hr

Will monitor electrolytes, glucose, and tolerance to TPN.



 DIANE SORIA [facility], 02/23/19 0923

## 2019-02-23 NOTE — PN
DATE:  02/23/2019



SUBJECTIVE:  The patient is resting, slightly propped up in bed, sleeping

comfortably in no apparent distress.  On questioning her, denied any complaint. 

The nursing staff stated that she continued to have high output from her

ileostomy and enterocutaneous fistula, although the abdominal wound skin

cellulitis is improving, the area of the skin between the fistula and

enterocutaneous.  The ileostomy continued to be extremely raw and macerated and

required frequent dressing changes.



OBJECTIVE:

GENERAL:  On examining her, she was pale, but no jaundice, cyanosis or

thyromegaly.  No jugular venous distension.  No lower limb edema.

VITAL SIGNS:  Her heart rate was 100, blood pressure 119/75, temperature was

97.7, respiratory rate was 14 and oxygen saturation was 95% on 2 liters of

oxygen.

The rest of clinical examination is stable, has not really changed.



Her intake over the last 24 hours was 850, output was 1800.



LABORATORY DATA:  As of this morning, her serum sodium was 143, potassium 4.3,

chloride 107, bicarbonate 33, anion gap of 3, BUN 11, creatinine 0.7, estimated

GFR was 86 mL per minute.  Her glucose 197, calcium was 8.4.  Her hemoglobin was

10, hematocrit 30 with normal white cell count and platelets.



ASSESSMENT:

1.  Acute pancreatitis with pseudocyst on CT scan.  Her lipase has normalized.

2.  Abdominal wall cellulitis for which she is on multiple antibiotics including

meropenem, vancomycin and anidulafungin.

3.  Enterocutaneous fistula and ileostomy, increased output after she was

started recently on a regular diet.  Her colorectal surgeon recommended that she

should go back on TPN and to go on fluid restriction.

4.  The patient has multiple other medical problems including:

a.  Morbid obesity, obstructive sleep apnea.

b.  Chronic obstructive pulmonary disease.

c.  Generalized osteoarthritis, difficulty walking.



PLAN:  To continue with TPN, continue with fluid restriction.  Continue with IV

antibiotic.  Apparently, she was accepted at [facility] and will

be discharged there tomorrow if a bed becomes available.

 



______________________________

RAISA HURLEY MD



DR:  ALIYA/chapincito  JOB#:  [id number] / [id number]

DD:  02/23/2019 07:24  DT:  02/23/2019 19:54

## 2019-02-24 NOTE — DISCH
DISCHARGE


DISCHARGE INFORMATION:


CONDITION ON DISCHARGE:  Stable





CODE STATUS:


Code Status:  Full





SKILLED NURSING:


SNF STAY <30 DAYS:  No





POST DISCHARGE ORDERS:


ACTIVITY ORDERS:  Resume previous activity


DIET AFTER DISCHARGE:  





TREATMENT/EQUIPMENT ORDERS:


INFUSION EQUIPMENT NEEDED:  PICC Line


Physical Therapy For:  Evalulation/Treatment


Occupational Therapy For:  Evaluation/Treatment





DISCHARGE MEDICATIONS:


Home Meds


Reported Medications


Cyanocobalamin (Vitamin B-12) (CYANOCOBALAMIN INJECTION) 1,000 Mcg/1 Ml Vial, 1 

ML IM QMONTH for SUPPLEMENT , #1 VIAL 3 Refills


   2/16/19


Oxycodone Hcl (OXYCODONE HCL) 5 Mg Capsule, 10 MG PO PRN Q4HRS PRN for PAIN, 

TAB 0 Refills


   2/16/19


Promethazine Hcl (PROMETHAZINE HCL) 25 Mg Tablet, 1 TAB PO PRN Q6HRS for N/V, #

20 TAB


   2/16/19


Ondansetron Hcl (ZOFRAN) 4 Mg Tablet, 4 MG IVP PRN Q4HRS PRN for NAUSEA/VOMITING

, TAB


   2/16/19


Acetaminophen (TYLENOL) 325 Mg Tablet, 2 TAB PO PRN Q4HRS for MILD PAIN, #30 TAB


   2/16/19


Ferrous Sulfate (FERROUS SULFATE) 325 Mg Tablet, 1 TAB PO BID for ANEMIA, #60 

TAB 3 Refills


   2/16/19


Lactobacillus Acidophilus (ACIDOPHILUS) 1 Each Capsule, 1 EACH PO BID for ATB 

USAGE , CAP


   2/16/19


Pantoprazole Sodium (PROTONIX) 20 Mg Tablet.dr, 40 MG PO DAILY for PROPHALYXIS 

, TAB


   2/16/19


Fentanyl (FENTANYL 25mcg/hr) 1 Each Patch.td72, 1 PATCH TD Q72H for CHRONIC PAIN

, PATCH


   2/16/19


Escitalopram Oxalate (ESCITALOPRAM OXALATE) 10 Mg Tablet, 1 TAB PO DAILY for 

DEPRESSION, #30 TAB 3 Refills


   2/16/19











RAISA HURLEY MD Feb 24, 2019 07:57

## 2019-02-24 NOTE — DS
DATE OF DISCHARGE:  02/24/2019



HISTORY OF PRESENT ILLNESS:  The patient is a 58-year-old  female

patient who was admitted with anterior abdominal wall cellulitis, maceration and

bleeding around the ileostomy as well as enterocutaneous fistula.  She was found

also to have pancreatitis with pseudocyst.  However, her serum lipase has

normalized.  She was seen in consultation by the Infectious Disease and was

started on IV meropenem and vancomycin as well as anidulafungin.  The Wound Care

have seen her and also recommended that there are dressing changes.  She is on

TPN as she has an enterocutaneous fistula.  Her TPN was discontinued about a

week prior to admission and was started on a regular diet.  The output from

ileostomy and the enterocutaneous fistula dramatically increased causing

irritation of the abdominal wall, maceration and anterior abdominal wall

cellulitis.   As she is now on multiple antibiotics and TPN and she has wounds, 

a decision was made to transfer her to [facility] to continue

with nutritional support in the form of TPN, continue with IV antibiotic and

wound care.



PHYSICAL EXAMINATION:

GENERAL:  When I saw her today, she looked well and was clearly in no apparent

respiratory distress, slightly pale, no jaundice, cyanosis or thyromegaly.  No

jugular venous distension.  No lower limb edema.

VITAL SIGNS:  Her heart rate was 91, blood pressure was 124/82, temperature was

97.5, respiratory rate was 19 and oxygen saturation was 95% on 2 liters of

oxygen.

HEAD, EYES, EARS, NOSE AND THROAT:  Showed normocephalic, atraumatic.

NECK:  Supple.

HEART:  Showed normal first and second heart sounds.  No gallop, rub or murmur.

CHEST:  Clear to auscultation.  No crepitation or rhonchi.

ABDOMEN:  Distended, soft, with the skin of the right lower quadrant marked

erythematous withdrawal area in the skin between the ileostomy and

enterocutaneous fistula.  There is no tenderness.  No guarding or rigidity.  No

organomegaly.  All hernial orifice intact.  Bowel sounds normal.

NEUROLOGIC:  She was awake, alert, responding appropriately.  Her cranial nerves

intact.  She moves extremities without difficulty.



Her intake was 350, output was 1150.



LABORATORY DATA:  Most recent white cell count was 7300, hemoglobin 9.5,

hematocrit 30, MCV 80 and platelet count 361,000.  Her chemistry showed a serum

sodium 143, potassium 4.3, chloride 107, bicarbonate 33, anion gap of 3, BUN 11,

creatinine 0.7, estimated GFR was 86 mL per minute.  Her glucose was 97, calcium

was 8.4.  Her prothrombin time was 13.5, INR 1.1.  Her nasal screen for MRSA was

positive; however, stool for occult blood was negative and toxic screen showed

vancomycin trough level was 21.



DISCHARGE MEDICATIONS:  She will be discharged to [facility] to

continue with the cyanocobalamin 1000 mcg intramuscular once a month.  She is on

TPN and vancomycin 1.75 gram IV every 24 hours, diphenhydramine 50 mg every 6

hours.  She is on nystatin powder applied topically twice a day, morphine

sulfate 4 mg IV every 4 hours.  Fentanyl patch 25 mcg per hour, to replace every

72 hours.  Micafungin 100 mg once a day, meropenem 500 mg IV every 6 hours,

ondansetron 4 mg every 4 hours as needed.  Lactobacillus rhamnosus 1 capsule

twice a day, citalopram hydrobromide 20 mg once a day, ferrous sulfate 325 mg

twice a day, Protonix 40 mg once a day, promethazine 25 mg every 6 hours,

acetaminophen 650 mg every 4 hours.



FINAL DISCHARGE DIAGNOSES:

1.  Acute pancreatitis with a pseudocyst on the CT scan, however, her serum

lipase has normalized.

2.  Abdominal wall cellulitis, for which she is now on multiple antibiotics

including meropenem, vancomycin and anidulafungin. 

3.  Enterocutaneous fistula and ileostomy, high output after she was started

recently on a regular diet.  Her colorectal surgeon recommended that she go back

on TPN and to limit her fluid intake.

4.  Her multiple other medical problems including:

A. Morbid obesity, obstructive sleep apnea.

B.  Chronic obstructive pulmonary disease.

C.  Generalized osteoarthritis with difficulty walking. 



The patient will be transferred to [facility] to continue TPN. 

Continue with IV antibiotic.  Continue with wound care and to start the process

of physical and occupational therapy.

 



______________________________

RAISA HURLEY MD



DR:  ALIYA/chapincito  JOB#:  [id number] / [id number]

DD:  02/24/2019 08:12  DT:  02/24/2019 09:03

## 2019-09-09 ENCOUNTER — HOSPITAL ENCOUNTER (INPATIENT)
Dept: HOSPITAL 61 - ER | Age: 59
LOS: 9 days | Discharge: NURSING, REHAB OR LONG-TERM CARE | DRG: 314 | End: 2019-09-18
Payer: COMMERCIAL

## 2019-09-09 VITALS — WEIGHT: 286.01 LBS | BODY MASS INDEX: 48.83 KG/M2 | HEIGHT: 64 IN

## 2019-09-09 DIAGNOSIS — M15.9: ICD-10-CM

## 2019-09-09 DIAGNOSIS — J96.22: ICD-10-CM

## 2019-09-09 DIAGNOSIS — J96.21: ICD-10-CM

## 2019-09-09 DIAGNOSIS — E83.42: ICD-10-CM

## 2019-09-09 DIAGNOSIS — E87.4: ICD-10-CM

## 2019-09-09 DIAGNOSIS — J44.9: ICD-10-CM
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
## 2019-09-09 DIAGNOSIS — Z90.49: ICD-10-CM

## 2019-09-09 DIAGNOSIS — N20.0: ICD-10-CM

## 2019-09-09 DIAGNOSIS — E44.1: ICD-10-CM

## 2019-09-09 DIAGNOSIS — A41.2: ICD-10-CM

## 2019-09-09 DIAGNOSIS — E66.01: ICD-10-CM

## 2019-09-09 DIAGNOSIS — T80.211A: Primary | ICD-10-CM

## 2019-09-09 DIAGNOSIS — I10: ICD-10-CM

## 2019-09-09 DIAGNOSIS — Z99.81: ICD-10-CM

## 2019-09-09 DIAGNOSIS — Z88.0: ICD-10-CM

## 2019-09-09 DIAGNOSIS — Z88.6: ICD-10-CM

## 2019-09-09 DIAGNOSIS — D63.8: ICD-10-CM

## 2019-09-09 DIAGNOSIS — F32.9: ICD-10-CM

## 2019-09-09 DIAGNOSIS — R65.20: ICD-10-CM

## 2019-09-09 DIAGNOSIS — G47.33: ICD-10-CM

## 2019-09-09 DIAGNOSIS — K63.2: ICD-10-CM

## 2019-09-09 DIAGNOSIS — Z87.891: ICD-10-CM

## 2019-09-09 DIAGNOSIS — Z85.118: ICD-10-CM

## 2019-09-09 DIAGNOSIS — L03.311: ICD-10-CM

## 2019-09-09 DIAGNOSIS — Z87.442: ICD-10-CM

## 2019-09-09 DIAGNOSIS — Z88.8: ICD-10-CM

## 2019-09-09 LAB
% BANDS: 2 % (ref 0–9)
% LYMPHS: 7 % (ref 24–48)
% MONOS: 3 % (ref 0–10)
% SEGS: 87 % (ref 35–66)
ALBUMIN SERPL-MCNC: 2.7 G/DL (ref 3.4–5)
ALBUMIN/GLOB SERPL: 0.5 {RATIO} (ref 1–1.7)
ALP SERPL-CCNC: 192 U/L (ref 46–116)
ALT SERPL-CCNC: 38 U/L (ref 14–59)
AMORPH SED URNS QL MICRO: PRESENT /HPF
ANION GAP SERPL CALC-SCNC: 7 MMOL/L (ref 6–14)
ANISOCYTOSIS BLD QL SMEAR: SLIGHT
APTT PPP: YELLOW S
AST SERPL-CCNC: 31 U/L (ref 15–37)
BACTERIA #/AREA URNS HPF: (no result) /HPF
BASOPHILS # BLD AUTO: 0 X10^3/UL (ref 0–0.2)
BASOPHILS NFR BLD: 0 % (ref 0–3)
BILIRUB SERPL-MCNC: 0.3 MG/DL (ref 0.2–1)
BILIRUB UR QL STRIP: NEGATIVE
BUN SERPL-MCNC: 21 MG/DL (ref 7–20)
BUN/CREAT SERPL: 23 (ref 6–20)
CALCIUM SERPL-MCNC: 9.4 MG/DL (ref 8.5–10.1)
CHLORIDE SERPL-SCNC: 105 MMOL/L (ref 98–107)
CK SERPL-CCNC: 20 U/L (ref 26–192)
CO2 SERPL-SCNC: 29 MMOL/L (ref 21–32)
CREAT SERPL-MCNC: 0.9 MG/DL (ref 0.6–1)
EOSINOPHIL NFR BLD AUTO: 1 % (ref 0–5)
EOSINOPHIL NFR BLD: 0.1 X10^3/UL (ref 0–0.7)
EOSINOPHIL NFR BLD: 1 % (ref 0–3)
ERYTHROCYTE [DISTWIDTH] IN BLOOD BY AUTOMATED COUNT: 17.6 % (ref 11.5–14.5)
FIBRINOGEN PPP-MCNC: CLEAR MG/DL
GFR SERPLBLD BASED ON 1.73 SQ M-ARVRAT: 64.3 ML/MIN
GLOBULIN SER-MCNC: 5.5 G/DL (ref 2.2–3.8)
GLUCOSE SERPL-MCNC: 142 MG/DL (ref 70–99)
HCT VFR BLD CALC: 35.3 % (ref 36–47)
HGB BLD-MCNC: 11.5 G/DL (ref 12–15.5)
LYMPHOCYTES # BLD: 0.4 X10^3/UL (ref 1–4.8)
LYMPHOCYTES NFR BLD AUTO: 4 % (ref 24–48)
MAGNESIUM SERPL-MCNC: 1.7 MG/DL (ref 1.8–2.4)
MCH RBC QN AUTO: 27 PG (ref 25–35)
MCHC RBC AUTO-ENTMCNC: 33 G/DL (ref 31–37)
MCV RBC AUTO: 83 FL (ref 79–100)
MONO #: 0.4 X10^3/UL (ref 0–1.1)
MONOCYTES NFR BLD: 5 % (ref 0–9)
NEUT #: 7.7 X10^3/UL (ref 1.8–7.7)
NEUTROPHILS NFR BLD AUTO: 89 % (ref 31–73)
NITRITE UR QL STRIP: NEGATIVE
PH UR STRIP: 5 [PH]
PLATELET # BLD AUTO: 299 X10^3/UL (ref 140–400)
PLATELET # BLD EST: ADEQUATE 10*3/UL
POTASSIUM SERPL-SCNC: 4.2 MMOL/L (ref 3.5–5.1)
PROT SERPL-MCNC: 8.2 G/DL (ref 6.4–8.2)
PROT UR STRIP-MCNC: NEGATIVE MG/DL
RBC # BLD AUTO: 4.24 X10^6/UL (ref 3.5–5.4)
RBC #/AREA URNS HPF: (no result) /HPF (ref 0–2)
SODIUM SERPL-SCNC: 141 MMOL/L (ref 136–145)
UROBILINOGEN UR-MCNC: 0.2 MG/DL
WBC # BLD AUTO: 8.6 X10^3/UL (ref 4–11)
WBC #/AREA URNS HPF: (no result) /HPF (ref 0–4)

## 2019-09-09 PROCEDURE — 94640 AIRWAY INHALATION TREATMENT: CPT

## 2019-09-09 PROCEDURE — 82553 CREATINE MB FRACTION: CPT

## 2019-09-09 PROCEDURE — 94760 N-INVAS EAR/PLS OXIMETRY 1: CPT

## 2019-09-09 PROCEDURE — G0378 HOSPITAL OBSERVATION PER HR: HCPCS

## 2019-09-09 PROCEDURE — 36569 INSJ PICC 5 YR+ W/O IMAGING: CPT

## 2019-09-09 PROCEDURE — 36415 COLL VENOUS BLD VENIPUNCTURE: CPT

## 2019-09-09 PROCEDURE — 80048 BASIC METABOLIC PNL TOTAL CA: CPT

## 2019-09-09 PROCEDURE — 96374 THER/PROPH/DIAG INJ IV PUSH: CPT

## 2019-09-09 PROCEDURE — 83690 ASSAY OF LIPASE: CPT

## 2019-09-09 PROCEDURE — 87077 CULTURE AEROBIC IDENTIFY: CPT

## 2019-09-09 PROCEDURE — 83880 ASSAY OF NATRIURETIC PEPTIDE: CPT

## 2019-09-09 PROCEDURE — 87040 BLOOD CULTURE FOR BACTERIA: CPT

## 2019-09-09 PROCEDURE — 87071 CULTURE AEROBIC QUANT OTHER: CPT

## 2019-09-09 PROCEDURE — 96375 TX/PRO/DX INJ NEW DRUG ADDON: CPT

## 2019-09-09 PROCEDURE — 93005 ELECTROCARDIOGRAM TRACING: CPT

## 2019-09-09 PROCEDURE — 87075 CULTR BACTERIA EXCEPT BLOOD: CPT

## 2019-09-09 PROCEDURE — 87641 MR-STAPH DNA AMP PROBE: CPT

## 2019-09-09 PROCEDURE — 71275 CT ANGIOGRAPHY CHEST: CPT

## 2019-09-09 PROCEDURE — 71045 X-RAY EXAM CHEST 1 VIEW: CPT

## 2019-09-09 PROCEDURE — 87205 SMEAR GRAM STAIN: CPT

## 2019-09-09 PROCEDURE — 81001 URINALYSIS AUTO W/SCOPE: CPT

## 2019-09-09 PROCEDURE — 85007 BL SMEAR W/DIFF WBC COUNT: CPT

## 2019-09-09 PROCEDURE — 87070 CULTURE OTHR SPECIMN AEROBIC: CPT

## 2019-09-09 PROCEDURE — 83605 ASSAY OF LACTIC ACID: CPT

## 2019-09-09 PROCEDURE — 82805 BLOOD GASES W/O2 SATURATION: CPT

## 2019-09-09 PROCEDURE — 84100 ASSAY OF PHOSPHORUS: CPT

## 2019-09-09 PROCEDURE — 83735 ASSAY OF MAGNESIUM: CPT

## 2019-09-09 PROCEDURE — 36600 WITHDRAWAL OF ARTERIAL BLOOD: CPT

## 2019-09-09 PROCEDURE — 94660 CPAP INITIATION&MGMT: CPT

## 2019-09-09 PROCEDURE — 74177 CT ABD & PELVIS W/CONTRAST: CPT

## 2019-09-09 PROCEDURE — 80053 COMPREHEN METABOLIC PANEL: CPT

## 2019-09-09 PROCEDURE — 80202 ASSAY OF VANCOMYCIN: CPT

## 2019-09-09 PROCEDURE — 84478 ASSAY OF TRIGLYCERIDES: CPT

## 2019-09-09 PROCEDURE — 85025 COMPLETE CBC W/AUTO DIFF WBC: CPT

## 2019-09-09 PROCEDURE — 85027 COMPLETE CBC AUTOMATED: CPT

## 2019-09-09 PROCEDURE — 84484 ASSAY OF TROPONIN QUANT: CPT

## 2019-09-10 VITALS — SYSTOLIC BLOOD PRESSURE: 133 MMHG | DIASTOLIC BLOOD PRESSURE: 65 MMHG

## 2019-09-10 VITALS — SYSTOLIC BLOOD PRESSURE: 100 MMHG | DIASTOLIC BLOOD PRESSURE: 50 MMHG

## 2019-09-10 VITALS — DIASTOLIC BLOOD PRESSURE: 50 MMHG | SYSTOLIC BLOOD PRESSURE: 88 MMHG

## 2019-09-10 VITALS — DIASTOLIC BLOOD PRESSURE: 62 MMHG | SYSTOLIC BLOOD PRESSURE: 118 MMHG

## 2019-09-10 VITALS — SYSTOLIC BLOOD PRESSURE: 98 MMHG | DIASTOLIC BLOOD PRESSURE: 46 MMHG

## 2019-09-10 VITALS — SYSTOLIC BLOOD PRESSURE: 90 MMHG | DIASTOLIC BLOOD PRESSURE: 60 MMHG

## 2019-09-10 VITALS — DIASTOLIC BLOOD PRESSURE: 41 MMHG | SYSTOLIC BLOOD PRESSURE: 76 MMHG

## 2019-09-10 VITALS — DIASTOLIC BLOOD PRESSURE: 61 MMHG | SYSTOLIC BLOOD PRESSURE: 114 MMHG

## 2019-09-10 VITALS — DIASTOLIC BLOOD PRESSURE: 59 MMHG | SYSTOLIC BLOOD PRESSURE: 99 MMHG

## 2019-09-10 VITALS — DIASTOLIC BLOOD PRESSURE: 64 MMHG | SYSTOLIC BLOOD PRESSURE: 113 MMHG

## 2019-09-10 VITALS — DIASTOLIC BLOOD PRESSURE: 52 MMHG | SYSTOLIC BLOOD PRESSURE: 90 MMHG

## 2019-09-10 VITALS — DIASTOLIC BLOOD PRESSURE: 45 MMHG | SYSTOLIC BLOOD PRESSURE: 93 MMHG

## 2019-09-10 VITALS — DIASTOLIC BLOOD PRESSURE: 67 MMHG | SYSTOLIC BLOOD PRESSURE: 118 MMHG

## 2019-09-10 VITALS — DIASTOLIC BLOOD PRESSURE: 72 MMHG | SYSTOLIC BLOOD PRESSURE: 119 MMHG

## 2019-09-10 VITALS — SYSTOLIC BLOOD PRESSURE: 105 MMHG | DIASTOLIC BLOOD PRESSURE: 58 MMHG

## 2019-09-10 VITALS — SYSTOLIC BLOOD PRESSURE: 100 MMHG | DIASTOLIC BLOOD PRESSURE: 59 MMHG

## 2019-09-10 VITALS — DIASTOLIC BLOOD PRESSURE: 56 MMHG | SYSTOLIC BLOOD PRESSURE: 95 MMHG

## 2019-09-10 VITALS — DIASTOLIC BLOOD PRESSURE: 65 MMHG | SYSTOLIC BLOOD PRESSURE: 106 MMHG

## 2019-09-10 VITALS — SYSTOLIC BLOOD PRESSURE: 104 MMHG | DIASTOLIC BLOOD PRESSURE: 50 MMHG

## 2019-09-10 VITALS — SYSTOLIC BLOOD PRESSURE: 94 MMHG | DIASTOLIC BLOOD PRESSURE: 53 MMHG

## 2019-09-10 VITALS — DIASTOLIC BLOOD PRESSURE: 55 MMHG | SYSTOLIC BLOOD PRESSURE: 106 MMHG

## 2019-09-10 VITALS — SYSTOLIC BLOOD PRESSURE: 87 MMHG | DIASTOLIC BLOOD PRESSURE: 40 MMHG

## 2019-09-10 VITALS — DIASTOLIC BLOOD PRESSURE: 67 MMHG | SYSTOLIC BLOOD PRESSURE: 113 MMHG

## 2019-09-10 VITALS — DIASTOLIC BLOOD PRESSURE: 54 MMHG | SYSTOLIC BLOOD PRESSURE: 93 MMHG

## 2019-09-10 LAB
BASE EXCESS ABG: -2 MMOL/L (ref -3–3)
BASE EXCESS ABG: -5 MMOL/L (ref -3–3)
BASE EXCESS STD BLDA CALC-SCNC: -3 MMOL/L (ref -3–3)
HCO3 BLDA-SCNC: 22 MMOL/L (ref 21–28)
HCO3 BLDA-SCNC: 24 MMOL/L (ref 21–28)
HCO3 BLDA-SCNC: 24 MMOL/L (ref 21–28)
INSPIRATION SETTING TIME VENT: 28
INSPIRATION SETTING TIME VENT: 40
METHGB MFR BLD: 0.4 % (ref 0–1.9)
OXYHGB MFR BLD: 89.8 %
PCO2 BLDA: 47 MMHG (ref 35–46)
PCO2 BLDA: 51 MMHG (ref 35–46)
PCO2 BLDA: 52 MMHG (ref 35–46)
PCO2 TEMP ADJ BLD: 59 MMHG
PH TEMP ADJ BLD: 7.24 [PH]
PO2 BLDA: 108 MMHG (ref 75–108)
PO2 BLDA: 62 MMHG (ref 75–108)
PO2 BLDA: 78 MMHG (ref 75–108)
PO2 TEMP ADJ BLD: 79 MMHG
SAO2 % BLDA: 90 % (ref 92–99)
SAO2 % BLDA: 95 % (ref 92–99)
SAO2 % BLDA: 98 % (ref 92–99)

## 2019-09-10 PROCEDURE — 5A09457 ASSISTANCE WITH RESPIRATORY VENTILATION, 24-96 CONSECUTIVE HOURS, CONTINUOUS POSITIVE AIRWAY PRESSURE: ICD-10-PCS

## 2019-09-10 RX ADMIN — Medication PRN MLS/HR: at 18:06

## 2019-09-10 RX ADMIN — Medication PRN EACH: at 16:09

## 2019-09-10 RX ADMIN — BACITRACIN SCH MLS/HR: 5000 INJECTION, POWDER, FOR SOLUTION INTRAMUSCULAR at 18:35

## 2019-09-10 RX ADMIN — Medication PRN EACH: at 16:14

## 2019-09-10 RX ADMIN — BACITRACIN SCH MLS/HR: 5000 INJECTION, POWDER, FOR SOLUTION INTRAMUSCULAR at 06:02

## 2019-09-10 RX ADMIN — NYSTATIN SCH APP: 100000 POWDER TOPICAL at 19:51

## 2019-09-10 RX ADMIN — VANCOMYCIN HYDROCHLORIDE SCH MLS/HR: 1 INJECTION, POWDER, FOR SOLUTION INTRAVENOUS at 19:50

## 2019-09-10 RX ADMIN — VANCOMYCIN HYDROCHLORIDE PRN EACH: 1 INJECTION, POWDER, LYOPHILIZED, FOR SOLUTION INTRAVENOUS at 08:56

## 2019-09-10 RX ADMIN — IPRATROPIUM BROMIDE AND ALBUTEROL SULFATE SCH ML: .5; 3 SOLUTION RESPIRATORY (INHALATION) at 15:34

## 2019-09-10 RX ADMIN — BACITRACIN SCH MLS/HR: 5000 INJECTION, POWDER, FOR SOLUTION INTRAMUSCULAR at 10:03

## 2019-09-10 RX ADMIN — FENTANYL CITRATE PRN MCG: 50 INJECTION INTRAMUSCULAR; INTRAVENOUS at 22:15

## 2019-09-10 RX ADMIN — Medication PRN MLS/HR: at 06:10

## 2019-09-10 RX ADMIN — IPRATROPIUM BROMIDE AND ALBUTEROL SULFATE SCH ML: .5; 3 SOLUTION RESPIRATORY (INHALATION) at 12:40

## 2019-09-10 RX ADMIN — MEROPENEM SCH MLS/HR: 500 INJECTION, POWDER, FOR SOLUTION INTRAVENOUS at 14:02

## 2019-09-10 RX ADMIN — MEROPENEM SCH MLS/HR: 500 INJECTION, POWDER, FOR SOLUTION INTRAVENOUS at 18:10

## 2019-09-10 RX ADMIN — IPRATROPIUM BROMIDE AND ALBUTEROL SULFATE SCH ML: .5; 3 SOLUTION RESPIRATORY (INHALATION) at 19:31

## 2019-09-10 RX ADMIN — DEXTROSE SCH MLS/HR: 50 INJECTION, SOLUTION INTRAVENOUS at 10:05

## 2019-09-10 RX ADMIN — MEROPENEM SCH MLS/HR: 500 INJECTION, POWDER, FOR SOLUTION INTRAVENOUS at 10:04

## 2019-09-10 RX ADMIN — FENTANYL CITRATE PRN MCG: 50 INJECTION INTRAMUSCULAR; INTRAVENOUS at 16:55

## 2019-09-10 RX ADMIN — IPRATROPIUM BROMIDE AND ALBUTEROL SULFATE SCH ML: .5; 3 SOLUTION RESPIRATORY (INHALATION) at 07:46

## 2019-09-10 RX ADMIN — ENOXAPARIN SODIUM SCH MG: 40 INJECTION SUBCUTANEOUS at 16:55

## 2019-09-10 NOTE — NUR
Pharmacy Vancomycin Dosing Note



S:Consulted to monitor and dose vancomycin started 09/10/19.



O:VAL NOLASCO is a 59 year old F with Sepsis/abd wall cellulitis



Height: 5 feet, 4 inches

Weight: 128.2 kg

Ideal Body Weight: 54.70 

Adjusted Body Weight: 84.10 

Dosing Weight: Actual



Other Antibiotics: 

Merrem 500mg q6h

Mycamine 100mg q24h



LABS:

Last BUN: 21 

Last Creatinine: 0.9 

Creatinine Clearance: 90 mL/min

Last WBC: 8.6 

Last Procalcitonin: 

Tmax (past 24 hours): 103.2 



Microbiology: -



I/O: 3700/585 





Last dose given 09/10/19 at 0130 



Vancomycin Dosing:

Loading Dose: 2000 mg x1

Dosing Weight: Actual

Target Trough: 15-20





A: Based on: weight and renal function





P: 1. Begin Vancomycin 1500 mg IV q18h

   2. Follow up Trough level on 09/12/19 at 0730 

   3. Pharmacy will continue to monitor, follow and adjust therapy as needed.

 

 Mohini Marsh RPH, 09/10/19 0900

## 2019-09-10 NOTE — CONS
DATE OF CONSULTATION:  



PULMONARY CONSULTATION



ATTENDING PHYSICIAN:  Davi Chacon MD



REASONS FOR CONSULTATION:  Sepsis and respiratory failure.



HISTORY OF PRESENT ILLNESS:  This is a 59-year-old morbidly obese patient with a

body mass index of 48.  The patient has history of tobacco use for 40+ years. 

She has history of sleep apnea.  She says she uses CPAP.  She was brought into

the [facility] with respiratory distress.  She had a fever of 104

last night at the [facility] care [facility].  She was started on broad-spectrum

antibiotic.  The patient has a complicated medical history.  She had

ileocecectomy and ileostomy secondary to cecal perforation in 2007 followed by

ileostomy leakage and chronic irritation of the skin from the stoma with

prolonged hospitalization in 07/2018 at .  She underwent ileostomy takedown,

primary incisional hernia repair, and soft tissue rearrangement flap.  Her

postoperative course was then complicated by sepsis, had multiple abdominal

surgeries, and has been maintained on TPN since 08/2018.  The patient underwent

CT of the chest while in the hospital.  I have reviewed the CT chest.  She had

no evidence of any pulmonary embolism.  There are infiltrates versus atelectasis

seen and small bilateral pleural effusions.  There were no acute abnormalities

on the CT abdomen and pelvis.  Her arterial blood gases were abnormal with pH of

7.29, pCO2 of 51, and a pO2 of 62 on 44% FiO2.  Current pH is 7.25, pCO2 52, and

a pO2 108 with a bicarbonate of 22 on 40% BiPAP.  She is awake, following

commands, and is able to give history.  She denies any cough or chest pain.  No

vomiting or diarrhea.



PAST MEDICAL HISTORY:  Significant for morbid obesity; history of lung cancer,

had surgery on the right side in [place] 8 years ago, details of which is not

available, she said there was no chemotherapy or radiation; history of

obstructive sleep apnea, on CPAP; history of kidney stones; hypertension;

depression; arthritis; chronic obstructive pulmonary disease, oxygen dependent;

history of pancreatitis.  She is on chronic TPN with very complicated

intra-abdominal surgeries and complications as discussed above.



PAST SURGICAL HISTORY:  As mentioned above.



FAMILY HISTORY:  Noncontributory to lungs.



SOCIAL HISTORY:  Resides in a nursing home prior to admission and has 40+ years

of tobacco use.



ALLERGIES:  ASPIRIN, HYDROCODONE, PENICILLIN.



MEDICATIONS:  Reviewed as listed in the MRAD.



REVIEW OF SYSTEMS:  A 10-point system obtained; pertinent positives discussed in

my history of present illness, otherwise noncontributory; all systems that had

been negative were reviewed as well.



PHYSICAL EXAMINATION:

GENERAL:  She is awake, following commands, on BiPAP.

VITAL SIGNS:  Her T-max is 104.7.  Blood pressure is stable.  Pulse oximetry is

91% on current BiPAP.

HEENT:  Sclerae anicteric.

NECK:  Supple.

LUNGS:  With diminished breath sounds.

CARDIOVASCULAR:  With a regular rate.

ABDOMEN:  Soft, nontender, obese.

EXTREMITIES:  With trace pitting edema.



LABORATORY DATA:  Reviewed; BUN 21, creatinine 0.9, albumin 2.7; white cell

count 8.6, hemoglobin 11.5, and platelets are 299.



IMPRESSION:

1.  Acute respiratory failure, requiring BiPAP.  Her arterial blood gases are

suggesting combination of respiratory and metabolic acidosis favoring more

metabolic component.  This is secondary to sepsis.

2.  High-grade fever.  The patient has history of central line associated

bloodstream infection and I suspect that this is the likely source.  She has

been on total parenteral nutrition chronically due to extensive abdominal

surgeries.

3.  Metabolic acidosis secondary to lactic acidosis.

4.  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

peripherally inserted central line since 08/2018.

5.  Chronic oxygen-dependent chronic obstructive pulmonary disease.

6.  Obstructive sleep apnea, on CPAP at home.

7.  History of acute pancreatitis with pseudocyst.

8.  Protein-calorie malnutrition, mild to moderate.

9.  Mild splenomegaly.

10.  Anemia.

11.  Morbid obesity.



RECOMMENDATIONS:

1.  Continue with present BiPAP.

2.  Give 2 ampules of bicarbonate and follow ABGs.

3.  We will remove the BiPAP once the pH is corrected.

4.  Broad-spectrum antibiotic per Infectious Disease.

5.  Follow all blood cultures.

6.  Clinically, less likely pneumonia.  CT findings may be related to

atelectasis, but we will monitor.

7.  Deep vein thrombosis prophylaxis.

8.  Stress ulcer prophylaxis.

9.  Discussed with RN and RT.  We will follow along with you.



TIME SPENT:  Critical care time is 37 minutes.

 



______________________________

YAJAIRA EDMOND MD



DR:  TERRY/chapincito  JOB#:  [id number] / [id number]

DD:  09/10/2019 11:14  DT:  09/10/2019 11:44

## 2019-09-10 NOTE — EKG
[facility]

              [address] [place] [address]

Test Date:    2019               Test Time:    22:17:10

Pat Name:     VAL NOLASCO              Department:   

Patient ID:   [id number]           Room:          

Gender:       F                        Technician:   

:          1960               Requested By: ELGIN TITUS

Order Number: [id number]           Reading MD:     

                                 Measurements

Intervals                              Axis          

Rate:         158                      P:            

ME:                                    QRS:          57

QRSD:         76                       T:            61

QT:           312                                    

QTc:          511                                    

                           Interpretive Statements

SUPRAVENTRICULAR TACHYCARDIA

INDETERMINATE AXIS

NON SPECIFIC ST-T ABNORMALITY (ELEVATION)

NON SPECIFIC ST DEPRESSION

BORDERLINE ECG

No previous ECG available for comparison

## 2019-09-10 NOTE — HP
ADMIT DATE:  09/10/2019



HISTORY OF PRESENT ILLNESS:  The patient is a 59-year-old  female, a

resident at [facility] in [place], who presented to Emergency Room of

[facility] with dyspnea and respiratory distress.  She apparently

was febrile, tachypneic, and tachycardic.  Her temperature on arrival was 104.7.

 She was extensively investigated in the emergency room.  Her white cell count

in fact was normal.  Her blood gases showed that she was acidotic.  Her

chemistry was generally mostly unremarkable and urinalysis was really

unremarkable in that there are rare wbcs, negative for nitrite and leukocyte

esterase.  Her CT scan of the chest, abdomen, and pelvis shows that there is no

filling defect seen within the major branches either pulmonary artery.  There is

no CT evidence of pulmonary embolism.  The heart is mild to moderately enlarged.

 Thoracic aorta is tortuous and tapers normally.  There are small bilateral

pleural effusions, left greater than the right, dependent atelectasis and/or

infiltrate left greater than the right is seen; patchy upper lobe infiltrate is

seen.  No pneumothorax is noted.  Right middle lobe atelectasis or infiltrate is

noted.  CT scan of the abdomen and pelvis was generally unremarkable.  The

patient was admitted with sepsis, the source of which was probably anterior

abdominal cellulitis versus line related sepsis.  She was pancultured and was

admitted with acute hypoxic hypercapnic respiratory failure, severe sepsis,

abdominal wall cellulitis, was started on antibiotic in the form of vancomycin,

micafungin, and meropenem as well as Levaquin.  She has had received about 3

liters of fluid in the emergency room and was admitted to Intensive Care Unit. 

As she was hypotensive, she was started on Levophed to maintain mean arterial

pressure of 65 or more.  The patient herself is complaining mostly of pain in

her abdominal wall.



PAST MEDICAL HISTORY:  Significant for morbid obesity, obstructive sleep apnea,

hypertension, chronic obstructive pulmonary disease, chronic hypoxic hypercapnic

respiratory failure, does have generalized osteoarthritis, anemia, apparently

had enterocutaneous fistula and ileocolonic fistula.  The patient has been on

TPN for extended period of time.  She was admitted to [facility]

and [facility] on multiple occasions.



PAST SURGICAL HISTORY:  Significant for exploratory laparotomy.  Unfortunately,

I do not have all the details of surgical intervention, but sufficed that she

has an enterocutaneous fistula that has been there for extended period of time

now.



FAMILY HISTORY:  Noncontributory.



SOCIAL HISTORY:  She is , although she currently resides at [facility].  She is an ex-smoker, does not drink alcohol or use recent

recreational drugs.  She used to be a .



REVIEW OF SYSTEMS:  As per history of present illness.



ALLERGIES:  SHE IS ALLERGIC TO PENICILLIN, ASPIRIN, AND HYDROCODONE.



MEDICATIONS:  She is currently on following medications:  She is on albuterol

sulfate 2.5 mg/3 mL by nebulizer every 4 hours, ferrous sulfate 325 mg twice a

day, Lovenox 120 mg subcutaneously twice a day, morphine sulfate 15 mg 4 times a

day, oxycodone 10 mg every 4 hours as needed, acetaminophen 650 mg every 4

hours, escitalopram oxalate 20 mg once a day, Ambien 5 mg at bedtime, calcium

carbonate 500 mg every 12 hours, Lomotil tablet 1 tablet 4 times a day,

lactobacillus acidophilus 1 capsule twice a day, loperamide for Imodium 2 mg

every 2 hours as needed, polyethylene glycol 17 grams daily p.r.n. for

constipation, ondansetron 4 mg every 4 hours as needed, Protonix 40 mg once a

day, and miconazole nitrate cream apply topically once a day.  She is on

cyanocobalamin 1000 mcg per mL intramuscular every month, ergocalciferol 50,000

International Unit once a week, and melatonin 3 mg at bedtime as needed for

insomnia.



PHYSICAL EXAMINATION:

GENERAL:  On arrival to the emergency room, the patient was extremely

tachypneic, tachycardic, and pale.  No jaundice, cyanosis, or thyromegaly.  No

jugular venous distention.  No limb edema.

VITAL SIGNS:  Her heart rate was 160, blood pressure was 127/58, temperature was

104.7, respiratory rate was 26, and oxygen saturation was 92% on room air.

HEENT:  Normocephalic and atraumatic.

NECK:  Supple.

HEART:  Showed normal first and second heart sounds.  No gallop or murmur.

CHEST:  Clear to auscultation.  No crepitation or rhonchi.

ABDOMEN:  Distended, soft, and nontender with enterocutaneous fistula in left

lower quadrant with surrounding cellulitis.

NEUROLOGIC:  She was sleepy but arousable.  All cranial nerves are intact.

EXTREMITIES:  She moves extremities without difficulty, although she is mostly

bedbound and chair-bound.



LABORATORY DATA:  Her lab work on arrival showed a white cell count of 8600,

hemoglobin 11.5, hematocrit 35, MCV 83, and platelet count 299,000 with a manual

differential showed 89% polymorphs and 4% lymphocytes.  Her chemistry showed

serum sodium 141, potassium 4.2, chloride 105, bicarbonate 29, anion gap of 7,

BUN 21, creatinine 0.9, estimated GFR was 64 mL per minute.  Her glucose 142,

calcium was 9.4, magnesium was 1.7.  Total bilirubin, AST, and ALT were normal. 

Alkaline phosphatase was slightly elevated.  Her total protein was 8.2, albumin

was 2.7.  Her arterial blood gases showed pH of 7.29, pCO2 of 51, pO2 of 59, and

oxygen saturation was 90% on FiO2 of 40%.  Her urinalysis showed the urine was

yellow and clear with pH of 5 and specific gravity 1.015.  The urine was

negative for protein, glucose, ketones, blood, nitrite, and leukocyte esterase. 

There are 1-2 rbcs, no wbcs, and very few bacteria.  Her serum lactic acid was

3.9.  Lipase was 1.37. 



IMAGING:  The CT scan of the chest, abdomen, and pelvis showed that the patient

has no evidence of pulmonary embolism; however, there are bilateral pleural

effusions left greater than right; dependent atelectasis and/or infiltrate left

greater than right is seen; patchy right upper lobe infiltrate is seen.  No

pneumothorax is noted.  Right middle lobe atelectasis and/or infiltrate noted. 

Her CT scan of the abdomen showed no acute abnormalities seen.



ASSESSMENT AND PLAN:  In summary, this is a 59-year-old  female

patient, who yet again came with another episode of sepsis likely due to

anterior abdominal wall cellulitis versus line related sepsis versus

healthcare-associated pneumonia.  She was pancultured, has received so far 3

liters of fluid, and was started on vancomycin, micafungin, and meropenem as she

is ALLERGIC TO PENICILLIN.  She continued to be hypotensive, so we will continue

with intravenous fluids, we will give another liter and continue with normal

saline at 150 mL per hour.  We will continue with Levophed to maintain her mean

arterial pressure of more than 65.  We did consult the infectious disease

specialist, who recommended discontinuation of her PICC line and sending the tip

for culture and sensitivity.  We will monitor her labs closely and obviously

adjust her antibiotic in collaboration with the infectious disease specialist. 

We will start her on TPN after removal of the PICC line and eventually she will

need another PICC line or central line.

 



______________________________

RAISA HURLEY MD



DR:  ALIYA/[email or web address]  JOB#:  [id number] / [id number]

DD:  09/10/2019 09:08  DT:  09/10/2019 10:52

## 2019-09-10 NOTE — NUR
PICC INSERTION NOTE

Procedure:  Following complete explanation of the PICC procedure including the indications, 
risks, and potential complications, informed consent was obtained.

 The possibility for infection was discussed along with signs, symptoms, and prevention. All 
the

[PATIENT AND FAMILY] questions were answered.





 Written and verbal patient education was provided. 





Hand hygiene performed. 





Standardized central line checklist was utilized. 





The patient was placed in the supine position, the arm was prepped with chlorhexidine and 
patient draped with maximum sterile barrier.  [0.

5] mL 1% lidocaine was infiltrated into the skin to provide local anesthesia.  A thorough 
assessment of [RIGHT] upper extremity completed.  Using real-time ultrasound guidance and 
standardized micro puncture set, the [RIGHT BASILIC] vein was punctured and a peel away 
sheath was placed using the modified Seldinger technique. A tip location device was used to 
ensure adequate catheter placement.





The catheter was secured using a securement device and an antimicrobial patch was applied 
directly on the insertion site followed by a transparent dressing.  All ports withdraw blood 
and flush without resistance. OLD PICC WAS REMOVED FROM ADJACENT VEIN AFTER NEW PICC 
INSERTED AND TIP PLACEMENT OF NEW PICC WAS RE-VERIFIED AFTER THE OLD PICC WAS REMOVED. TIP 
WAS SENT FOR CULTURE BY [facility]





Patient tolerated the procedure without apparent complication(s). [TRIPLE Lumen Power PICC 
placement successful and uncomplicated.  Placement verified by EKG tip confirmation system 
and/or chest x-ray.  Tip located in the [LOWER 1/3 OF THE SVC]



Complications:

[NONE]

## 2019-09-10 NOTE — RAD
CTA scan of the Chest with Contrast (Pulmonary Embolism protocol) 9/9/2019

 

Clinical History: Shortness of breath. Hypoxia.

 

Technique: After the intravenous administration of 100 cc of Omnipaque 

350, contiguous, 0.625 mm axial sections were obtained through the chest. 

2  mm axial and 3D MIP coronal and sagittal reconstructed images were 

obtained. 

 

One or more of the following individualized dose reduction techniques were

utilized for this study:

 

1. Automated exposure control.

2. Adjustment of the mA and/or kV according to patient size.

3. Use of iterative reconstruction technique.

 

 

 

Findings: Images from this study are degraded by patient and/or 

respiratory motion. 

 

No filling defect is seen within the major branches of either pulmonary 

artery. There is no CT evidence of pulmonary embolism. The heart is mild 

to moderately enlarged. The thoracic aorta is tortuous but tapers 

normally.

 

There are small bilateral pleural effusions, left greater than right. 

Dependent atelectasis and or infiltrate, left greater than right is seen. 

Patchy right upper lobe infiltrate is seen. No pneumothorax is noted. 

Right middle lobe atelectasis and or infiltrate is noted.

 

Impression: There is no definite CT evidence of pulmonary embolism.

 

 

 

CT scan abdomen and pelvis with contrast 9/9/2019

 

CLINICAL HISTORY: Abdominal pain.

 

TECHNIQUE: After the intravenous administration 100 cc of Omnipaque 350, 

contiguous, 5 mm axial sections were obtained through the abdomen and 

pelvis.

 

One or more of the following individualized dose reduction techniques were

utilized for this study:

 

1. Automated exposure control.

2. Adjustment of the mA and/or kV according to patient size.

3. Use of iterative reconstruction technique.

 

 

FINDINGS: Comparison study is dated 2/15/2019.

 

Some of the images are degraded by patient motion.

 

The liver is enlarged measuring 22 cm in length. Decreased attenuation of 

the liver parenchyma seen consistent with fatty infiltration. The spleen 

is mildly enlarged measuring 13.6 cm in length. The pancreas is within 

normal limits. Hypertrophy of the left adrenal gland is again seen. The 

patient appears to be post right adrenalectomy. A 3.8 cm rounded 

low-attenuation lesion is seen involving the left kidney which likely 

represents a cyst. The lower pole of the right kidney is again noted to be

heterogeneous.

 

Atherosclerotic calcification of the abdominal aorta is seen. The 

abdominal aorta tapers normally. Postsurgical changes are seen involving 

the anterior abdominal wall. There is no evidence of bowel obstruction.

 

Images through the pelvis demonstrate a Silva catheter within urinary 

bladder. Scattered diverticula are seen involving the sigmoid colon. No 

inflammatory changes are seen in the adjacent fat. Several rounded 

low-attenuation lesions are seen within the subcutaneous fat of the left 

anterior abdominal wall extending into the pelvis. Some of these contain 

air. They measure 1 cm to 3 cm in size. They likely represent injection 

granulomas. The osseous structures are unchanged.

 

IMPRESSION: No acute abnormality is seen.

 

Electronically signed by: Maurice Henriquez MD (9/10/2019 12:04 AM) [facility]

## 2019-09-10 NOTE — NUR
Pharmacy TPN Dosing Note



S: VAL NOLASCO is a 59 year old F Currently receiving Central Continuous TPN 



B:Pertinent PMH: enterocutaneous fistula and ileocolonic fistula

Height: 5 feet, 4 inches

Weight: 128.2 kg



Current diet: NPO 



LABS:

Sodium:    141 

Potassium: 4.2 

Chloride:  105 

Calcium:   9.4 

Corrected Calcium: 10.44 

Magnesium: 1.7 

CO2:       29 

SCr:       0.9 

Glucose:   142 

Albumin:   2.7 

AST:       31 

ALT:       38 



TPN FORMULA:

TPN TYPE:  Central Continuous

AMINO ACIDS:         100 gm

DEXTROSE:            150 gm

LIPIDS:              30 gm



SODIUM CHLORIDE:     210 mEq

SODIUM PHOSPHATE:    15 mmol

POTASSIUM CHLORIDE:  40 mEq

MAGNESIUM:           16 mEq

CALCIUM:             10 mEq

MULTIPLE VITAMIN:    10 ml

TRACE ELEMENTS:      1 ml(s)



TPN PLAN:  

Continue TPN formula from nursing home ([facility] home infusion is supplier)





R: Continue TPN 

Will monitor electrolytes, glucose, and tolerance to TPN.



 Mohini Masrh KENDALL, 09/10/19 0029

## 2019-09-10 NOTE — NUR
Patient presents on bipap resting comfortably.  ID request removal of PICC and culture tip.  
Patient's only other peripheral IV was accidentally removed by patient.  Patient has poor 
peripheral access.  Incompatible medications for midline.  A new PICC placement has been 
ordered.  Continuing patient care.

## 2019-09-10 NOTE — NUR
IP: Pt has a hx of + mrsa screen on 2/16/19. Pt to be in contact precautions and Nozin/CHG 
decolonization initiated.

## 2019-09-10 NOTE — NUR
Wound/Ostomy care

Pt seen for excoriated abdomen around ostomy and fistula. Pt has a fistula on left side and 
ileostomy on right upper abdomen. Applied skin prep, ostomy powder, 4" rings and wound 
drainage bag attached to Silva bag. Good seal obtained. WC/Ostomy RN will continue to follow 
for bag changes. No other wounds noted, coccyx is red but blanchable and pt has yeasty rash 
in folds. Pt left on right side with heels floated.

## 2019-09-10 NOTE — NUR
SS following for discharge planning. SS received notification that pt is from [facility] [place]. SS contacted [facility] [place], [phone]; fax [phone], and verified 
that pt is a LTC resident from there facility and is able to return when medically stable 
for discharge. SS will continue to follow for discharge planning.

## 2019-09-10 NOTE — PHYS DOC
Past Medical History


Past Medical History:  COPD, Diverticulitis


Additional Past Medical Histor:  OSTOMY IN 2007, FISTULA IN 2018


Past Surgical History:  Colectomy


Alcohol Use:  None


Drug Use:  None





Adult General


Chief Complaint


Chief Complaint:  DYSPNEA/RESPIRATOY DISTRESS





HPI


HPI





Patient is a 59  year old [f__sex] who presents with []





Review of Systems


Review of Systems





Constitutional: Denies fever or chills []


Eyes: Denies change in visual acuity, redness, or eye pain []


HENT: Denies nasal congestion or sore throat []


Respiratory: Denies cough or shortness of breath []


Cardiovascular: No additional information not addressed in HPI []


GI: Denies abdominal pain, nausea, vomiting, bloody stools or diarrhea []


: Denies dysuria or hematuria []


Musculoskeletal: Denies back pain or joint pain []


Integument: Denies rash or skin lesions []


Neurologic: Denies headache, focal weakness or sensory changes []


Endocrine: Denies polyuria or polydipsia []





All other systems were reviewed and found to be within normal limits, except as 

documented in this note.





Current Medications


Current Medications





Current Medications








 Medications


  (Trade)  Dose


 Ordered  Sig/Laura  Start Time


 Stop Time Status Last Admin


Dose Admin


 


 Acetaminophen


  (Tylenol Supp)  650 mg  1X  ONCE  9/9/19 23:45


 9/9/19 23:46 DC 9/10/19 00:28


650 MG


 


 Acetaminophen


  (Tylenol)  500 mg  1X  ONCE  9/9/19 22:30


 9/9/19 23:37 DC  





 


 Albuterol/


 Ipratropium


  (Duoneb)  3 ml  1X  ONCE  9/9/19 22:15


 9/9/19 22:18 DC 9/9/19 22:24


3 ML


 


 Aztreonam


  (Azactam)  2 gm  1X  ONCE  9/9/19 22:30


 9/9/19 22:31 DC 9/9/19 22:51


2 GM


 


 Dexamethasone


 Sodium Phosphate


  (Decadron)  10 mg  1X  ONCE  9/9/19 22:30


 9/9/19 22:31 DC 9/9/19 23:58


10 MG


 


 Fentanyl Citrate


  (Fentanyl 2ml


 Vial)  75 mcg  1X  ONCE  9/9/19 23:45


 9/9/19 23:46 DC 9/9/19 23:58


75 MCG


 


 Info


  (CONTRAST GIVEN


 -- Rx MONITORING)  1 each  PRN DAILY  PRN  9/9/19 23:30


 9/11/19 23:29   





 


 Iohexol


  (Omnipaque 350


 Mg/ml)  100 ml  1X  ONCE  9/9/19 23:30


 9/9/19 23:31 DC 9/9/19 23:37


100 ML


 


 Ondansetron HCl


  (Zofran)  4 mg  1X  ONCE  9/9/19 23:00


 9/9/19 23:01 DC 9/9/19 23:58


4 MG


 


 Sodium Chloride  1,000 ml @ 


 1,000 mls/hr  1X  ONCE  9/10/19 00:30


 9/10/19 01:29   














Allergies


Allergies





Allergies








Coded Allergies Type Severity Reaction Last Updated Verified


 


  Penicillins Allergy Intermediate  2/16/19 Yes


 


  aspirin Allergy Intermediate  2/17/19 Yes


 


  hydrocodone Allergy Intermediate Nausea 2/15/19 Yes


 


  I S O L A T I O N *CONTACT* Allergy Unknown  2/18/19 Yes











Physical Exam


Physical Exam





Constitutional: Well developed, well nourished, no acute distress, non-toxic 

appearance. []


HENT: Normocephalic, atraumatic, bilateral external ears normal, oropharynx 

moist, no oral exudates, nose normal. []


Eyes: PERRLA, EOMI, conjunctiva normal, no discharge. [] 


Neck: Normal range of motion, no tenderness, supple, no stridor. [] 


Cardiovascular:Heart rate regular rhythm, no murmur []


Lungs & Thorax:  Bilateral breath sounds clear to auscultation []


Abdomen: Bowel sounds normal, soft, no tenderness, no masses, no pulsatile 

masses. [] 


Skin: Warm, dry, no erythema, no rash. [] 


Back: No tenderness, no CVA tenderness. [] 


Extremities: No tenderness, no cyanosis, no clubbing, ROM intact, no edema. [] 


Neurologic: Alert and oriented X 3, normal motor function, normal sensory 

function, no focal deficits noted. []


Psychologic: Affect normal, judgement normal, mood normal. []





Current Patient Data


Vital Signs





                                   Vital Signs








  Date Time  Temp Pulse Resp B/P (MAP) Pulse Ox O2 Delivery O2 Flow Rate FiO2


 


9/10/19 00:37  148 26  97 BiPAP/CPAP  


 


9/10/19 00:18    124/64 (84)   6.0 


 


9/9/19 22:07 104.7       





 104.7       








Lab Values





                                Laboratory Tests








Test


 9/9/19


22:44 9/9/19


23:10 9/10/19


00:15


 


White Blood Count


 8.6 x10^3/uL


(4.0-11.0) 


 





 


Red Blood Count


 4.24 x10^6/uL


(3.50-5.40) 


 





 


Hemoglobin


 11.5 g/dL


(12.0-15.5)  L 


 





 


Hematocrit


 35.3 %


(36.0-47.0)  L 


 





 


Mean Corpuscular Volume


 83 fL ()


 


 





 


Mean Corpuscular Hemoglobin 27 pg (25-35)    


 


Mean Corpuscular Hemoglobin


Concent 33 g/dL


(31-37) 


 





 


Red Cell Distribution Width


 17.6 %


(11.5-14.5)  H 


 





 


Platelet Count


 299 x10^3/uL


(140-400) 


 





 


Neutrophils (%) (Auto) 89 % (31-73)  H  


 


Lymphocytes (%) (Auto) 4 % (24-48)  L  


 


Monocytes (%) (Auto) 5 % (0-9)    


 


Eosinophils (%) (Auto) 1 % (0-3)    


 


Basophils (%) (Auto) 0 % (0-3)    


 


Neutrophils # (Auto)


 7.7 x10^3/uL


(1.8-7.7) 


 





 


Lymphocytes # (Auto)


 0.4 x10^3/uL


(1.0-4.8)  L 


 





 


Monocytes # (Auto)


 0.4 x10^3/uL


(0.0-1.1) 


 





 


Eosinophils # (Auto)


 0.1 x10^3/uL


(0.0-0.7) 


 





 


Basophils # (Auto)


 0.0 x10^3/uL


(0.0-0.2) 


 





 


Segmented Neutrophils % 87 % (35-66)  H  


 


Band Neutrophils % 2 % (0-9)    


 


Lymphocytes % 7 % (24-48)  L  


 


Monocytes % 3 % (0-10)    


 


Eosinophils % 1 % (0-5)    


 


Platelet Estimate


 Adequate


(ADEQUATE) 


 





 


Anisocytosis Slight    


 


Sodium Level


 141 mmol/L


(136-145) 


 





 


Potassium Level


 4.2 mmol/L


(3.5-5.1) 


 





 


Chloride Level


 105 mmol/L


() 


 





 


Carbon Dioxide Level


 29 mmol/L


(21-32) 


 





 


Anion Gap 7 (6-14)    


 


Blood Urea Nitrogen


 21 mg/dL


(7-20)  H 


 





 


Creatinine


 0.9 mg/dL


(0.6-1.0) 


 





 


Estimated GFR


(Cockcroft-Gault) 64.3  


 


 





 


BUN/Creatinine Ratio 23 (6-20)  H  


 


Glucose Level


 142 mg/dL


(70-99)  H 


 





 


Lactic Acid Level


 3.9 mmol/L


(0.4-2.0)  H 


 





 


Calcium Level


 9.4 mg/dL


(8.5-10.1) 


 





 


Magnesium Level


 1.7 mg/dL


(1.8-2.4)  L 


 





 


Total Bilirubin


 0.3 mg/dL


(0.2-1.0) 


 





 


Aspartate Amino Transferase


(AST) 31 U/L (15-37)


 


 





 


Alanine Aminotransferase (ALT)


 38 U/L (14-59)


 


 





 


Alkaline Phosphatase


 192 U/L


()  H 


 





 


Creatine Kinase


 20 U/L


()  L 


 





 


Creatine Kinase MB (Mass)


 < 0.5 ng/mL


(0.0-3.6) 


 





 


Creatine Kinase MB Relative


Index  % (0-4)  


 


 





 


Troponin I Quantitative


 < 0.017 ng/mL


(0.000-0.055) 


 





 


NT-Pro-B-Type Natriuretic


Peptide 31 pg/mL


(0-124) 


 





 


Total Protein


 8.2 g/dL


(6.4-8.2) 


 





 


Albumin


 2.7 g/dL


(3.4-5.0)  L 


 





 


Albumin/Globulin Ratio


 0.5 (1.0-1.7)


L 


 





 


Lipase


 137 U/L


() 


 





 


Urine Collection Type  Unknown   


 


Urine Color  Yellow   


 


Urine Clarity  Clear   


 


Urine pH  5.0   


 


Urine Specific Gravity  1.015   


 


Urine Protein


 


 Negative mg/dL


(NEG-TRACE) 





 


Urine Glucose (UA)


 


 Negative mg/dL


(NEG) 





 


Urine Ketones (Stick)


 


 Negative mg/dL


(NEG) 





 


Urine Blood


 


 Moderate (NEG)


 





 


Urine Nitrite


 


 Negative (NEG)


 





 


Urine Bilirubin


 


 Negative (NEG)


 





 


Urine Urobilinogen Dipstick


 


 0.2 mg/dL (0.2


mg/dL) 





 


Urine Leukocyte Esterase


 


 Negative (NEG)


 





 


Urine RBC


 


 1-2 /HPF (0-2)


 





 


Urine WBC


 


 Rare /HPF


(0-4) 





 


Urine Amorphous Sediment  Present /HPF   


 


Urine Bacteria


 


 Few /HPF


(0-FEW) 





 


O2 Saturation   90 % (92-99)  L


 


Arterial Blood pH


 


 


 7.29


(7.35-7.45)  L


 


Arterial Blood pH (Temp


corrected) 


 


 7.24  





 


Arterial Blood pCO2 at


Patient Temp 


 


 51 mmHg


(35-46)  H


 


Arterial Blood pCO2 (Temp


correct) 


 


 59 mmHg  





 


Arterial Blood pO2 at Patient


Temp 


 


 62 mmHg


()  L


 


Arterial Blood pO2 (Temp


corrected) 


 


 79 mmHg  





 


Arterial Blood HCO3


 


 


 24 mmol/L


(21-28)


 


Arterial Blood Base Excess


 


 


 -3 mmol/L


(-3-3)


 


Oxyhemoglobin   89.8 %  


 


Methemoglobin


 


 


 0.4 %


(0.0-1.9)


 


Carbon Monoxide, Quantitative


 


 


 0.3 %


(0.0-1.9)


 


FiO2   44  





                                Laboratory Tests


9/9/19 22:44








                                Laboratory Tests


9/9/19 22:44














EKG


EKG


@2217 SVT at 159bpm, NO ST elevation





Radiology/Procedures


Radiology/Procedures


PROCEDURE: CT ANGIO CHEST W ABD PEL W/





 


CTA scan of the Chest with Contrast (Pulmonary Embolism protocol) 9/9/2019


 


Clinical History: Shortness of breath. Hypoxia.


 


Technique: After the intravenous administration of 100 cc of Omnipaque 


350, contiguous, 0.625 mm axial sections were obtained through the chest. 


2  mm axial and 3D MIP coronal and sagittal reconstructed images were 


obtained. 


 


One or more of the following individualized dose reduction techniques were


utilized for this study:


 


1. Automated exposure control.


2. Adjustment of the mA and/or kV according to patient size.


3. Use of iterative reconstruction technique.


 


 


 


Findings: Images from this study are degraded by patient and/or 


respiratory motion. 


 


No filling defect is seen within the major branches of either pulmonary 


artery. There is no CT evidence of pulmonary embolism. The heart is mild 


to moderately enlarged. The thoracic aorta is tortuous but tapers 


normally.


 


There are small bilateral pleural effusions, left greater than right. 


Dependent atelectasis and or infiltrate, left greater than right is seen. 


Patchy right upper lobe infiltrate is seen. No pneumothorax is noted. 


Right middle lobe atelectasis and or infiltrate is noted.


 


Impression: There is no definite CT evidence of pulmonary embolism.


 


 


 


CT scan abdomen and pelvis with contrast 9/9/2019


 


CLINICAL HISTORY: Abdominal pain.


 


TECHNIQUE: After the intravenous administration 100 cc of Omnipaque 350, 


contiguous, 5 mm axial sections were obtained through the abdomen and 


pelvis.


 


One or more of the following individualized dose reduction techniques were


utilized for this study:


 


1. Automated exposure control.


2. Adjustment of the mA and/or kV according to patient size.


3. Use of iterative reconstruction technique.


 


 


FINDINGS: Comparison study is dated 2/15/2019.


 


Some of the images are degraded by patient motion.


 


The liver is enlarged measuring 22 cm in length. Decreased attenuation of 


the liver parenchyma seen consistent with fatty infiltration. The spleen 


is mildly enlarged measuring 13.6 cm in length. The pancreas is within 


normal limits. Hypertrophy of the left adrenal gland is again seen. The 


patient appears to be post right adrenalectomy. A 3.8 cm rounded 


low-attenuation lesion is seen involving the left kidney which likely 


represents a cyst. The lower pole of the right kidney is again noted to be


heterogeneous.


 


Atherosclerotic calcification of the abdominal aorta is seen. The 


abdominal aorta tapers normally. Postsurgical changes are seen involving 


the anterior abdominal wall. There is no evidence of bowel obstruction.


 


Images through the pelvis demonstrate a Silva catheter within urinary 


bladder. Scattered diverticula are seen involving the sigmoid colon. No 


inflammatory changes are seen in the adjacent fat. Several rounded 


low-attenuation lesions are seen within the subcutaneous fat of the left 


anterior abdominal wall extending into the pelvis. Some of these contain 


air. They measure 1 cm to 3 cm in size. They likely represent injection 


granulomas. The osseous structures are unchanged.


 


IMPRESSION: No acute abnormality is seen.


 


Electronically signed by: Maurice Henriquez MD (9/10/2019 12:04 AM) [facility]





Course & Med Decision Making


Course & Med Decision Making


Pertinent Labs and Imaging studies reviewed. (See chart for details)





[]





Dragon Disclaimer


Dragon Disclaimer


This electronic medical record was generated, in whole or in part, using a voice

 recognition dictation system.





Departure


Departure


Impression:  


   Primary Impression:  


   Respiratory failure


   Additional Impressions:  


   Abdominal wall cellulitis


   Hypoxia


   Hypomagnesemia


   Severe sepsis


Disposition:  09 ADMITTED AS INPATIENT


Admitting Physician:  Michael Tomlinson


Condition:  GUARDED


Referrals:  


UNKNOWN PCP NAME (PCP)





Date and Time of Reassessment


Date:  Sep 10, 2019


Time:  01:00





Fluid Challenge


Is the fluid challenge complet:  Yes


IBW Target Volume Used:  Yes


BMI > 30:  Yes





Vital Signs


Vital Signs:





Vital Signs








  Date Time  Temp Pulse Resp B/P (MAP) Pulse Ox O2 Delivery O2 Flow Rate FiO2


 


9/10/19 00:37  148 26  97 BiPAP/CPAP  


 


9/10/19 00:18    124/64 (84)   6.0 


 


9/9/19 22:07 104.7       





 104.7       








Temperature Source:  Axillary





Respirations


Respiratory Effort:  Shortness of breath, Labored, Pursed lip


Respiratory Pattern:  Tachypnea





Cardiovascular


Pulse Rhythm:  Irregular


Heart:  S1 and S2 normal (Tachycardia)





Lung Sounds


Breath Sounds:  Crackles





Capillary Refil


Capillary Refill:  Rt Hand < 3 seconds





Peripheral Pulse


Pulse Location:  Radial


Pulse Strength:  Normal (2+)


Pulse Assessment Method:  Palpation





Integumentary


Skin:  Warm, Dry


Skin Moisture:  Dry


Skin Turgor:  Normal


Skin Color:  warm


Fingernail Color:  WNL





Critical Care Time


Critical care time was 30 minutes which includes time at bedside, spent in 

discussion of patient's care with specialists and/or family members, with 

interpretation of laboratory and/or radiological studies and is exclusive of 

procedures.





Problem Qualifiers








   Primary Impression:  


   Respiratory failure


   Chronicity:  acute  Respiratory failure complication:  hypoxia and 

   hypercapnia  Qualified Codes:  J96.01 - Acute respiratory failure with 

   hypoxia; J96.02 - Acute respiratory failure with hypercapnia








ELGIN TITUS DO             Sep 10, 2019 01:07

## 2019-09-10 NOTE — NUR
Pt brought by ED nurse to room 108 at 0155. Pt brought on bed wearing O2 nc. Pt transferred 
to ICU bed x5 assist. Pts SPO2 low upon admission, in mid 80s. Pt placed back on BiPap once 
transferred into the bed. SPO2 then 97% on BiPap. Temperature upon admission 103.2F. Fan 
placed on pt along with ice packs in axillary areas/behind neck. All other VSS upon 
admission. Pt a&ox4. Two ostomies noted on pt, one in RUQ and one in the mid upper abd. Both 
producing moderate amounts of loose stool. Pt tolerating BiPap well. Adequate urine output 
noted so far this shift. Pts lungs coarse and diminished upon auscultation. When reassessed, 
temperature noted to be 98.2F axillary. At approx 0500, pt BP dropped to 70/43. Pt continued 
to have low SBP with low MAPs. Dr. Chacon notified at this time. Received order from Dr. Chacon 
at 0515 to start Levophed per protocol and start  ml/hr. Orders entered into system. 
Pt currently resting with eyes closed and call light within reach.

## 2019-09-10 NOTE — CONS
DATE OF CONSULTATION:  09/10/2019



REFERRING PHYSICIAN:  Davi Chacon MD



REASON FOR CONSULTATION:  Sepsis.



HISTORY OF PRESENT ILLNESS:  A 59-year-old female, nursing home resident, was

brought to the ER with dyspnea and respiratory distress.  The patient had a

fever of 104 last night.  She had lactic acidosis.  She received a dose of

aztreonam, steroids, IV vancomycin.  Currently, she is on IV vancomycin and

Levaquin.  The patient has PICC line for TPN, which she is receiving chronically

at the nursing home facility.  The patient has a very complicated history of

ileocecectomy and ileostomy secondary to cecal perforation from colonoscopy in

2007 followed by ileostomy leakage, chronic irritation of the skin from the

stoma with prolonged hospitalization at [facility] in 07/2018 after she underwent

ileostomy takedown, primary incisional hernia repair and soft tissue

rearrangement flap.  The patient's postop course was complicated by sepsis,

ileocolonic and stenotic leak and intra-abdominal abscesses.  She underwent

subsequently multiple abdominal surgeries, ileostomy formation, revision and

debridement of necrotic subcutaneous flap closure.  She subsequently developed

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  The

patient also has history of central line bloodstream infection, which is

attributed to PICC line infection in the past.  The patient underwent CT of the

chest, abdomen and pelvis, which did not show any acute abnormality.  The

patient had a Silva, which was placed in the ER.  The patient currently is on

BiPAP.  ID consult has been requested for further evaluation and treatment.  The

patient says she has pain in her abdomen, had a fever of 103.2 last night,

currently afebrile, has shortness of breath, also has a lot of excoriation

between the 2 stomas, dressing was in poor condition per team.



PAST MEDICAL HISTORY:  Morbid obesity, history of lung cancer, obstructive sleep

apnea, kidney stones, hypertension, depression, arthritis, COPD, oxygen

dependent, history of pancreatitis, on TPN, very complicated intra-abdominal

surgeries with complications as above.



PAST SURGICAL HISTORY:  As mentioned above.



FAMILY HISTORY:  As per HPI.



SOCIAL HISTORY:  Residing in nursing home prior to admission.



ALLERGIES:  ASPIRIN, HYDROCODONE, PENICILLIN IS ALSO LISTED.



CURRENT MEDICATIONS:  Vancomycin, Levaquin.



REVIEW OF SYSTEMS:  Limited, but as above in HPI.



PHYSICAL EXAMINATION:

VITAL SIGNS:  Temperature 103.2, current temperature 98.2; pulse 124; blood

pressure 88/50; oxygen saturation 99% on BiPAP.

GENERAL:  The patient is lying in bed, on BiPAP, alert, appears tired.

HEENT:  No icterus; on BiPAP.

NECK:  Supple.

LUNGS:  Clear anteriorly.

HEART:  S1, S2, tachycardia.  No murmurs.

ABDOMEN:  Obese, soft, bowel sounds present.  Two stomas with surrounding

excoriation, redness, tenderness.

EXTREMITIES:  No edema or cyanosis.

DERMATOLOGIC:  Warm and dry.  No generalized rash.

NEUROLOGIC:  Alert and oriented x 3.

LINES:  Right upper extremity PICC line without signs of complication.



IMAGING:  CT chest shows no definite CT evidence of pulmonary embolism; small

bilateral pleural effusions; dependent atelectasis and/or infiltrate; patchy

right upper lobe infiltrate is seen; right middle lobe atelectasis and/or

infiltrate seen.



CT abdomen shows enlarged liver; fatty infiltration; the spleen is mildly

enlarged; a 3.8 cm lesion on the left kidney, which represents cyst;

atherosclerotic calcification of the abdominal aorta; scattered diverticula;

post-surgical changes seen in the anterior abdominal wall; no evidence of bowel

obstruction; no other acute abnormality seen.



IMPRESSION:

1.  Sepsis.

2.  Fever, can be central line-associated bloodstream infection or abdominal

wall cellulitis.

3.  Lactic acidosis.

4.  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

peripherally inserted central line since 08/2018.

5.  History of acute pancreatitis with pseudocyst.

6.  Chronic obstructive pulmonary disease.

7.  Protein-calorie malnutrition.

8.  Mild splenomegaly.

9.  Anemia.

10.  Nursing home resident.

11.  Morbid obesity.



RECOMMENDATIONS:

1.  Restart vancomycin.

2.  We will add meropenem and micafungin.

3.  Status post one dose of Levaquin and aztreonam.

4.  Continue to monitor labs and kidney functions closely.

5.  Wound care has been consulted.

6.  Follow up labs and cultures.

7.  Continue supportive care.

8.  Discontinue PICC line and send catheter tip for cultures.

9.  Repeat blood cultures.



Thank you for asking us to participate in this patient's care.  Should you have

any further questions or concerns, please contact me.  We will continue to

follow along with you.  Discussed with nursing staff.

 



______________________________

ARUNDHATI LADD, MD



DR:  PUMA/chapincito  JOB#:  [id number] / [id number]

DD:  09/10/2019 08:18  DT:  09/10/2019 09:01

## 2019-09-10 NOTE — NUR
PCN, asa, lortab

Allergies and reactions  

 

INR  na BUN 21   Cr 0.9   Platelets 299

 

Blood culture done yes

blood culture results  pending

 

Order Verified yes

 

Consent signed yes

 

Previous PICC placement yes

 

Past Medical/Surgical history and current diagnosis reviewed yes

 

Patient Medical /Surgical History Related to PICC line placement 

Arrhythmias, SR with PAC's

Infectious Disease consult

Past central line or venous access device placement

Problems breathing lying flat

Septicemia/Bacteremia

Suspected device related infection

Special considerations for PICC line placement 



Infections 



 

 

PICC placement indication   

Caustic medication class drug usage,



Multiple/ Frequent blood draws,  

Poor peripheral intravenous access 

 

                name of PICC Nurse

Efraín Baird RN

## 2019-09-11 VITALS — DIASTOLIC BLOOD PRESSURE: 63 MMHG | SYSTOLIC BLOOD PRESSURE: 107 MMHG

## 2019-09-11 VITALS — DIASTOLIC BLOOD PRESSURE: 65 MMHG | SYSTOLIC BLOOD PRESSURE: 101 MMHG

## 2019-09-11 VITALS — DIASTOLIC BLOOD PRESSURE: 51 MMHG | SYSTOLIC BLOOD PRESSURE: 81 MMHG

## 2019-09-11 VITALS — DIASTOLIC BLOOD PRESSURE: 55 MMHG | SYSTOLIC BLOOD PRESSURE: 104 MMHG

## 2019-09-11 VITALS — DIASTOLIC BLOOD PRESSURE: 56 MMHG | SYSTOLIC BLOOD PRESSURE: 87 MMHG

## 2019-09-11 VITALS — DIASTOLIC BLOOD PRESSURE: 58 MMHG | SYSTOLIC BLOOD PRESSURE: 98 MMHG

## 2019-09-11 VITALS — SYSTOLIC BLOOD PRESSURE: 116 MMHG | DIASTOLIC BLOOD PRESSURE: 63 MMHG

## 2019-09-11 VITALS — DIASTOLIC BLOOD PRESSURE: 43 MMHG | SYSTOLIC BLOOD PRESSURE: 91 MMHG

## 2019-09-11 VITALS — DIASTOLIC BLOOD PRESSURE: 59 MMHG | SYSTOLIC BLOOD PRESSURE: 101 MMHG

## 2019-09-11 VITALS — SYSTOLIC BLOOD PRESSURE: 96 MMHG | DIASTOLIC BLOOD PRESSURE: 54 MMHG

## 2019-09-11 VITALS — SYSTOLIC BLOOD PRESSURE: 100 MMHG | DIASTOLIC BLOOD PRESSURE: 51 MMHG

## 2019-09-11 VITALS — SYSTOLIC BLOOD PRESSURE: 107 MMHG | DIASTOLIC BLOOD PRESSURE: 55 MMHG

## 2019-09-11 VITALS — SYSTOLIC BLOOD PRESSURE: 80 MMHG | DIASTOLIC BLOOD PRESSURE: 54 MMHG

## 2019-09-11 VITALS — DIASTOLIC BLOOD PRESSURE: 59 MMHG | SYSTOLIC BLOOD PRESSURE: 97 MMHG

## 2019-09-11 VITALS — DIASTOLIC BLOOD PRESSURE: 62 MMHG | SYSTOLIC BLOOD PRESSURE: 103 MMHG

## 2019-09-11 VITALS — SYSTOLIC BLOOD PRESSURE: 76 MMHG | DIASTOLIC BLOOD PRESSURE: 41 MMHG

## 2019-09-11 VITALS — DIASTOLIC BLOOD PRESSURE: 47 MMHG | SYSTOLIC BLOOD PRESSURE: 87 MMHG

## 2019-09-11 VITALS — DIASTOLIC BLOOD PRESSURE: 57 MMHG | SYSTOLIC BLOOD PRESSURE: 92 MMHG

## 2019-09-11 VITALS — SYSTOLIC BLOOD PRESSURE: 104 MMHG | DIASTOLIC BLOOD PRESSURE: 56 MMHG

## 2019-09-11 VITALS — DIASTOLIC BLOOD PRESSURE: 54 MMHG | SYSTOLIC BLOOD PRESSURE: 91 MMHG

## 2019-09-11 VITALS — DIASTOLIC BLOOD PRESSURE: 62 MMHG | SYSTOLIC BLOOD PRESSURE: 104 MMHG

## 2019-09-11 VITALS — SYSTOLIC BLOOD PRESSURE: 86 MMHG | DIASTOLIC BLOOD PRESSURE: 54 MMHG

## 2019-09-11 VITALS — DIASTOLIC BLOOD PRESSURE: 61 MMHG | SYSTOLIC BLOOD PRESSURE: 100 MMHG

## 2019-09-11 VITALS — SYSTOLIC BLOOD PRESSURE: 103 MMHG | DIASTOLIC BLOOD PRESSURE: 57 MMHG

## 2019-09-11 LAB
ALBUMIN SERPL-MCNC: 2 G/DL (ref 3.4–5)
ALBUMIN/GLOB SERPL: 0.5 {RATIO} (ref 1–1.7)
ALP SERPL-CCNC: 113 U/L (ref 46–116)
ALT SERPL-CCNC: 25 U/L (ref 14–59)
ANION GAP SERPL CALC-SCNC: 5 MMOL/L (ref 6–14)
AST SERPL-CCNC: 18 U/L (ref 15–37)
BASE EXCESS ABG: 1 MMOL/L (ref -3–3)
BILIRUB SERPL-MCNC: 0.2 MG/DL (ref 0.2–1)
BUN SERPL-MCNC: 20 MG/DL (ref 7–20)
BUN/CREAT SERPL: 25 (ref 6–20)
CALCIUM SERPL-MCNC: 9 MG/DL (ref 8.5–10.1)
CHLORIDE SERPL-SCNC: 110 MMOL/L (ref 98–107)
CO2 SERPL-SCNC: 31 MMOL/L (ref 21–32)
CREAT SERPL-MCNC: 0.8 MG/DL (ref 0.6–1)
ERYTHROCYTE [DISTWIDTH] IN BLOOD BY AUTOMATED COUNT: 18.3 % (ref 11.5–14.5)
GFR SERPLBLD BASED ON 1.73 SQ M-ARVRAT: 73.7 ML/MIN
GLOBULIN SER-MCNC: 4.4 G/DL (ref 2.2–3.8)
GLUCOSE SERPL-MCNC: 132 MG/DL (ref 70–99)
HCO3 BLDA-SCNC: 27 MMOL/L (ref 21–28)
HCT VFR BLD CALC: 27.6 % (ref 36–47)
HGB BLD-MCNC: 8.6 G/DL (ref 12–15.5)
INSPIRATION SETTING TIME VENT: 28
MAGNESIUM SERPL-MCNC: 1.9 MG/DL (ref 1.8–2.4)
MCH RBC QN AUTO: 26 PG (ref 25–35)
MCHC RBC AUTO-ENTMCNC: 31 G/DL (ref 31–37)
MCV RBC AUTO: 83 FL (ref 79–100)
PCO2 BLDA: 48 MMHG (ref 35–46)
PHOSPHATE SERPL-MCNC: 3.2 MG/DL (ref 2.6–4.7)
PLATELET # BLD AUTO: 235 X10^3/UL (ref 140–400)
PO2 BLDA: 80 MMHG (ref 75–108)
POTASSIUM SERPL-SCNC: 3.7 MMOL/L (ref 3.5–5.1)
PROT SERPL-MCNC: 6.4 G/DL (ref 6.4–8.2)
RBC # BLD AUTO: 3.32 X10^6/UL (ref 3.5–5.4)
SAO2 % BLDA: 95 % (ref 92–99)
SODIUM SERPL-SCNC: 146 MMOL/L (ref 136–145)
TRIGL SERPL-MCNC: 136 MG/DL (ref 0–150)
WBC # BLD AUTO: 8.1 X10^3/UL (ref 4–11)

## 2019-09-11 RX ADMIN — VANCOMYCIN HYDROCHLORIDE PRN EACH: 1 INJECTION, POWDER, LYOPHILIZED, FOR SOLUTION INTRAVENOUS at 13:11

## 2019-09-11 RX ADMIN — FENTANYL CITRATE PRN MCG: 50 INJECTION INTRAMUSCULAR; INTRAVENOUS at 07:26

## 2019-09-11 RX ADMIN — ONDANSETRON PRN MG: 2 INJECTION INTRAMUSCULAR; INTRAVENOUS at 08:37

## 2019-09-11 RX ADMIN — ENOXAPARIN SODIUM SCH MG: 40 INJECTION SUBCUTANEOUS at 21:24

## 2019-09-11 RX ADMIN — DEXTROSE SCH MLS/HR: 50 INJECTION, SOLUTION INTRAVENOUS at 09:41

## 2019-09-11 RX ADMIN — MEROPENEM SCH MLS/HR: 500 INJECTION, POWDER, FOR SOLUTION INTRAVENOUS at 05:57

## 2019-09-11 RX ADMIN — ENOXAPARIN SODIUM SCH MG: 40 INJECTION SUBCUTANEOUS at 12:27

## 2019-09-11 RX ADMIN — FENTANYL CITRATE PRN MCG: 50 INJECTION INTRAMUSCULAR; INTRAVENOUS at 22:43

## 2019-09-11 RX ADMIN — NYSTATIN SCH APP: 100000 POWDER TOPICAL at 09:41

## 2019-09-11 RX ADMIN — MEROPENEM SCH MLS/HR: 500 INJECTION, POWDER, FOR SOLUTION INTRAVENOUS at 12:26

## 2019-09-11 RX ADMIN — BACITRACIN SCH MLS/HR: 5000 INJECTION, POWDER, FOR SOLUTION INTRAMUSCULAR at 14:35

## 2019-09-11 RX ADMIN — NYSTATIN SCH APP: 100000 POWDER TOPICAL at 22:42

## 2019-09-11 RX ADMIN — IPRATROPIUM BROMIDE AND ALBUTEROL SULFATE SCH ML: .5; 3 SOLUTION RESPIRATORY (INHALATION) at 20:06

## 2019-09-11 RX ADMIN — FENTANYL CITRATE PRN MCG: 50 INJECTION INTRAMUSCULAR; INTRAVENOUS at 11:44

## 2019-09-11 RX ADMIN — FENTANYL CITRATE PRN MCG: 50 INJECTION INTRAMUSCULAR; INTRAVENOUS at 18:45

## 2019-09-11 RX ADMIN — FENTANYL CITRATE PRN MCG: 50 INJECTION INTRAMUSCULAR; INTRAVENOUS at 16:34

## 2019-09-11 RX ADMIN — VANCOMYCIN HYDROCHLORIDE SCH MLS/HR: 1 INJECTION, POWDER, FOR SOLUTION INTRAVENOUS at 15:03

## 2019-09-11 RX ADMIN — MEROPENEM SCH MLS/HR: 500 INJECTION, POWDER, FOR SOLUTION INTRAVENOUS at 00:02

## 2019-09-11 RX ADMIN — BACITRACIN SCH MLS/HR: 5000 INJECTION, POWDER, FOR SOLUTION INTRAMUSCULAR at 21:15

## 2019-09-11 RX ADMIN — MEROPENEM SCH MLS/HR: 500 INJECTION, POWDER, FOR SOLUTION INTRAVENOUS at 18:26

## 2019-09-11 RX ADMIN — Medication PRN EACH: at 13:28

## 2019-09-11 RX ADMIN — FENTANYL CITRATE PRN MCG: 50 INJECTION INTRAMUSCULAR; INTRAVENOUS at 02:28

## 2019-09-11 RX ADMIN — IPRATROPIUM BROMIDE AND ALBUTEROL SULFATE SCH ML: .5; 3 SOLUTION RESPIRATORY (INHALATION) at 15:34

## 2019-09-11 RX ADMIN — BACITRACIN SCH MLS/HR: 5000 INJECTION, POWDER, FOR SOLUTION INTRAMUSCULAR at 00:11

## 2019-09-11 RX ADMIN — BACITRACIN SCH MLS/HR: 5000 INJECTION, POWDER, FOR SOLUTION INTRAMUSCULAR at 11:20

## 2019-09-11 RX ADMIN — ONDANSETRON PRN MG: 2 INJECTION INTRAMUSCULAR; INTRAVENOUS at 18:27

## 2019-09-11 RX ADMIN — Medication SCH CAP: at 21:21

## 2019-09-11 NOTE — PDOC
PULMONARY PROGRESS NOTES


Subjective


fully awake, off BIPAP


ABG much improved


Vitals





Vital Signs








  Date Time  Temp Pulse Resp B/P (MAP) Pulse Ox O2 Delivery O2 Flow Rate FiO2


 


9/11/19 09:32   20  92 BiPAP/CPAP  


 


9/11/19 05:54  100  101/65 (77)    


 


9/11/19 03:00 99.6       





 99.6       








ROS:  No Chest Pain, No Increase Cough


General:  Alert, No acute distress


Lungs:  Clear


Abdomen:  Soft, Other (obese,colostomy in place)


Extremities:  Other (1+edema)


Skin:  Warm, Dry


Labs





Laboratory Tests








Test


 9/9/19


22:44 9/9/19


23:10 9/10/19


00:15 9/10/19


03:00


 


White Blood Count


 8.6 x10^3/uL


(4.0-11.0) 


 


 





 


Red Blood Count


 4.24 x10^6/uL


(3.50-5.40) 


 


 





 


Hemoglobin


 11.5 g/dL


(12.0-15.5) 


 


 





 


Hematocrit


 35.3 %


(36.0-47.0) 


 


 





 


Mean Corpuscular Volume 83 fL ()    


 


Mean Corpuscular Hemoglobin 27 pg (25-35)    


 


Mean Corpuscular Hemoglobin


Concent 33 g/dL


(31-37) 


 


 





 


Red Cell Distribution Width


 17.6 %


(11.5-14.5) 


 


 





 


Platelet Count


 299 x10^3/uL


(140-400) 


 


 





 


Neutrophils (%) (Auto) 89 % (31-73)    


 


Lymphocytes (%) (Auto) 4 % (24-48)    


 


Monocytes (%) (Auto) 5 % (0-9)    


 


Eosinophils (%) (Auto) 1 % (0-3)    


 


Basophils (%) (Auto) 0 % (0-3)    


 


Neutrophils # (Auto)


 7.7 x10^3/uL


(1.8-7.7) 


 


 





 


Lymphocytes # (Auto)


 0.4 x10^3/uL


(1.0-4.8) 


 


 





 


Monocytes # (Auto)


 0.4 x10^3/uL


(0.0-1.1) 


 


 





 


Eosinophils # (Auto)


 0.1 x10^3/uL


(0.0-0.7) 


 


 





 


Basophils # (Auto)


 0.0 x10^3/uL


(0.0-0.2) 


 


 





 


Segmented Neutrophils % 87 % (35-66)    


 


Band Neutrophils % 2 % (0-9)    


 


Lymphocytes % 7 % (24-48)    


 


Monocytes % 3 % (0-10)    


 


Eosinophils % 1 % (0-5)    


 


Platelet Estimate


 Adequate


(ADEQUATE) 


 


 





 


Anisocytosis Slight    


 


Sodium Level


 141 mmol/L


(136-145) 


 


 





 


Potassium Level


 4.2 mmol/L


(3.5-5.1) 


 


 





 


Chloride Level


 105 mmol/L


() 


 


 





 


Carbon Dioxide Level


 29 mmol/L


(21-32) 


 


 





 


Anion Gap 7 (6-14)    


 


Blood Urea Nitrogen


 21 mg/dL


(7-20) 


 


 





 


Creatinine


 0.9 mg/dL


(0.6-1.0) 


 


 





 


Estimated GFR


(Cockcroft-Gault) 64.3 


 


 


 





 


BUN/Creatinine Ratio 23 (6-20)    


 


Glucose Level


 142 mg/dL


(70-99) 


 


 





 


Lactic Acid Level


 3.9 mmol/L


(0.4-2.0) 


 


 2.0 mmol/L


(0.4-2.0)


 


Calcium Level


 9.4 mg/dL


(8.5-10.1) 


 


 





 


Magnesium Level


 1.7 mg/dL


(1.8-2.4) 


 


 





 


Total Bilirubin


 0.3 mg/dL


(0.2-1.0) 


 


 





 


Aspartate Amino Transf


(AST/SGOT) 31 U/L (15-37) 


 


 


 





 


Alanine Aminotransferase


(ALT/SGPT) 38 U/L (14-59) 


 


 


 





 


Alkaline Phosphatase


 192 U/L


() 


 


 





 


Creatine Kinase


 20 U/L


() 


 


 





 


Creatine Kinase MB (Mass)


 < 0.5 ng/mL


(0.0-3.6) 


 


 





 


Creatine Kinase MB Relative


Index  % (0-4) 


 


 


 





 


Troponin I Quantitative


 < 0.017 ng/mL


(0.000-0.055) 


 


 < 0.017 ng/mL


(0.000-0.055)


 


NT-Pro-B-Type Natriuretic


Peptide 31 pg/mL


(0-124) 


 


 





 


Total Protein


 8.2 g/dL


(6.4-8.2) 


 


 





 


Albumin


 2.7 g/dL


(3.4-5.0) 


 


 





 


Albumin/Globulin Ratio 0.5 (1.0-1.7)    


 


Lipase


 137 U/L


() 


 


 





 


Urine Collection Type  Unknown   


 


Urine Color  Yellow   


 


Urine Clarity  Clear   


 


Urine pH  5.0   


 


Urine Specific Gravity  1.015   


 


Urine Protein


 


 Negative mg/dL


(NEG-TRACE) 


 





 


Urine Glucose (UA)


 


 Negative mg/dL


(NEG) 


 





 


Urine Ketones (Stick)


 


 Negative mg/dL


(NEG) 


 





 


Urine Blood  Moderate (NEG)   


 


Urine Nitrite  Negative (NEG)   


 


Urine Bilirubin  Negative (NEG)   


 


Urine Urobilinogen Dipstick


 


 0.2 mg/dL (0.2


mg/dL) 


 





 


Urine Leukocyte Esterase  Negative (NEG)   


 


Urine RBC  1-2 /HPF (0-2)   


 


Urine WBC


 


 Rare /HPF


(0-4) 


 





 


Urine Amorphous Sediment  Present /HPF   


 


Urine Bacteria


 


 Few /HPF


(0-FEW) 


 





 


O2 Saturation   90 % (92-99)  


 


Arterial Blood pH


 


 


 7.29


(7.35-7.45) 





 


Arterial Blood pH (Temp


corrected) 


 


 7.24 


 





 


Arterial Blood pCO2 at


Patient Temp 


 


 51 mmHg


(35-46) 





 


Arterial Blood pCO2 (Temp


correct) 


 


 59 mmHg 


 





 


Arterial Blood pO2 at Patient


Temp 


 


 62 mmHg


() 





 


Arterial Blood pO2 (Temp


corrected) 


 


 79 mmHg 


 





 


Arterial Blood HCO3


 


 


 24 mmol/L


(21-28) 





 


Arterial Blood Base Excess


 


 


 -3 mmol/L


(-3-3) 





 


Oxyhemoglobin   89.8 %  


 


Methemoglobin


 


 


 0.4 %


(0.0-1.9) 





 


Carbon Monoxide, Quantitative


 


 


 0.3 %


(0.0-1.9) 





 


FiO2   44  


 


Test


 9/10/19


07:27 9/10/19


07:55 9/10/19


13:40 9/11/19


06:00


 


Troponin I Quantitative


 < 0.017 ng/mL


(0.000-0.055) 


 


 





 


O2 Saturation  98 % (92-99)  95 % (92-99)  


 


Arterial Blood pH


 


 7.25


(7.35-7.45) 7.33


(7.35-7.45) 





 


Arterial Blood pCO2 at


Patient Temp 


 52 mmHg


(35-46) 47 mmHg


(35-46) 





 


Arterial Blood pO2 at Patient


Temp 


 108 mmHg


() 78 mmHg


() 





 


Arterial Blood HCO3


 


 22 mmol/L


(21-28) 24 mmol/L


(21-28) 





 


Arterial Blood Base Excess


 


 -5 mmol/L


(-3-3) -2 mmol/L


(-3-3) 





 


FiO2  40  28  


 


White Blood Count


 


 


 


 8.1 x10^3/uL


(4.0-11.0)


 


Red Blood Count


 


 


 


 3.32 x10^6/uL


(3.50-5.40)


 


Hemoglobin


 


 


 


 8.6 g/dL


(12.0-15.5)


 


Hematocrit


 


 


 


 27.6 %


(36.0-47.0)


 


Mean Corpuscular Volume    83 fL () 


 


Mean Corpuscular Hemoglobin    26 pg (25-35) 


 


Mean Corpuscular Hemoglobin


Concent 


 


 


 31 g/dL


(31-37)


 


Red Cell Distribution Width


 


 


 


 18.3 %


(11.5-14.5)


 


Platelet Count


 


 


 


 235 x10^3/uL


(140-400)


 


Test


 9/11/19


07:30 9/11/19


08:00 9/11/19


09:55 





 


Phosphorus Level


 3.2 mg/dL


(2.6-4.7) 


 


 





 


Magnesium Level


 1.9 mg/dL


(1.8-2.4) 


 


 





 


Triglycerides Level


 136 mg/dL


(0-150) 


 


 





 


O2 Saturation  95 % (92-99)   


 


Arterial Blood pH


 


 7.37


(7.35-7.45) 


 





 


Arterial Blood pCO2 at


Patient Temp 


 48 mmHg


(35-46) 


 





 


Arterial Blood pO2 at Patient


Temp 


 80 mmHg


() 


 





 


Arterial Blood HCO3


 


 27 mmol/L


(21-28) 


 





 


Arterial Blood Base Excess


 


 1 mmol/L


(-3-3) 


 





 


FiO2  28   


 


Sodium Level


 


 


 146 mmol/L


(136-145) 





 


Potassium Level


 


 


 3.7 mmol/L


(3.5-5.1) 





 


Chloride Level


 


 


 110 mmol/L


() 





 


Carbon Dioxide Level


 


 


 31 mmol/L


(21-32) 





 


Anion Gap   5 (6-14)  


 


Blood Urea Nitrogen


 


 


 20 mg/dL


(7-20) 





 


Creatinine


 


 


 0.8 mg/dL


(0.6-1.0) 





 


Estimated GFR


(Cockcroft-Gault) 


 


 73.7 


 





 


BUN/Creatinine Ratio   25 (6-20)  


 


Glucose Level


 


 


 132 mg/dL


(70-99) 





 


Calcium Level


 


 


 9.0 mg/dL


(8.5-10.1) 





 


Total Bilirubin


 


 


 0.2 mg/dL


(0.2-1.0) 





 


Aspartate Amino Transf


(AST/SGOT) 


 


 18 U/L (15-37) 


 





 


Alanine Aminotransferase


(ALT/SGPT) 


 


 25 U/L (14-59) 


 





 


Alkaline Phosphatase


 


 


 113 U/L


() 





 


Total Protein


 


 


 6.4 g/dL


(6.4-8.2) 





 


Albumin


 


 


 2.0 g/dL


(3.4-5.0) 





 


Albumin/Globulin Ratio   0.5 (1.0-1.7)  








Laboratory Tests








Test


 9/10/19


13:40 9/11/19


06:00 9/11/19


07:30 9/11/19


08:00


 


O2 Saturation 95 % (92-99)    95 % (92-99) 


 


Arterial Blood pH


 7.33


(7.35-7.45) 


 


 7.37


(7.35-7.45)


 


Arterial Blood pCO2 at


Patient Temp 47 mmHg


(35-46) 


 


 48 mmHg


(35-46)


 


Arterial Blood pO2 at Patient


Temp 78 mmHg


() 


 


 80 mmHg


()


 


Arterial Blood HCO3


 24 mmol/L


(21-28) 


 


 27 mmol/L


(21-28)


 


Arterial Blood Base Excess


 -2 mmol/L


(-3-3) 


 


 1 mmol/L


(-3-3)


 


FiO2 28    28 


 


White Blood Count


 


 8.1 x10^3/uL


(4.0-11.0) 


 





 


Red Blood Count


 


 3.32 x10^6/uL


(3.50-5.40) 


 





 


Hemoglobin


 


 8.6 g/dL


(12.0-15.5) 


 





 


Hematocrit


 


 27.6 %


(36.0-47.0) 


 





 


Mean Corpuscular Volume  83 fL ()   


 


Mean Corpuscular Hemoglobin  26 pg (25-35)   


 


Mean Corpuscular Hemoglobin


Concent 


 31 g/dL


(31-37) 


 





 


Red Cell Distribution Width


 


 18.3 %


(11.5-14.5) 


 





 


Platelet Count


 


 235 x10^3/uL


(140-400) 


 





 


Phosphorus Level


 


 


 3.2 mg/dL


(2.6-4.7) 





 


Magnesium Level


 


 


 1.9 mg/dL


(1.8-2.4) 





 


Triglycerides Level


 


 


 136 mg/dL


(0-150) 





 


Test


 9/11/19


09:55 


 


 





 


Sodium Level


 146 mmol/L


(136-145) 


 


 





 


Potassium Level


 3.7 mmol/L


(3.5-5.1) 


 


 





 


Chloride Level


 110 mmol/L


() 


 


 





 


Carbon Dioxide Level


 31 mmol/L


(21-32) 


 


 





 


Anion Gap 5 (6-14)    


 


Blood Urea Nitrogen


 20 mg/dL


(7-20) 


 


 





 


Creatinine


 0.8 mg/dL


(0.6-1.0) 


 


 





 


Estimated GFR


(Cockcroft-Gault) 73.7 


 


 


 





 


BUN/Creatinine Ratio 25 (6-20)    


 


Glucose Level


 132 mg/dL


(70-99) 


 


 





 


Calcium Level


 9.0 mg/dL


(8.5-10.1) 


 


 





 


Total Bilirubin


 0.2 mg/dL


(0.2-1.0) 


 


 





 


Aspartate Amino Transf


(AST/SGOT) 18 U/L (15-37) 


 


 


 





 


Alanine Aminotransferase


(ALT/SGPT) 25 U/L (14-59) 


 


 


 





 


Alkaline Phosphatase


 113 U/L


() 


 


 





 


Total Protein


 6.4 g/dL


(6.4-8.2) 


 


 





 


Albumin


 2.0 g/dL


(3.4-5.0) 


 


 





 


Albumin/Globulin Ratio 0.5 (1.0-1.7)    








Medications





Active Scripts








 Medications  Dose


 Route/Sig


 Max Daily Dose Days Date Category


 


 Multivitamins


  (Multivitamin) 1


 Each Tablet  1 Tab


 PO DAILY


   9/10/19 Reported


 


 Vitamin D2


  (Ergocalciferol


  (Vitamin D2))


 50,000 Unit


 Capsule  50,000 Unit


 PO WEEKLY


   9/10/19 Reported


 


 Lovenox


  (Enoxaparin


 Sodium) 120


 Mg/0.8 Ml


 Disp.syrin  120 Mg


 SQ BID


   9/10/19 Reported


 


 Cyanocobalamin


 Injection


  (Cyanocobalamin


  (Vitamin B-12))


 1,000 Mcg/1 Ml


 Vial  1 Ml


 IM QMONTH


   9/10/19 Reported


 


 Calcium Carbonate


 500 Mg/5 Ml


 Oral.susp  500 Mg


 PO PRN Q12HRS PRN


   9/10/19 Reported


 


 Albuterol Sulfate


 Neb Soln


  (Albuterol


 Sulfate) 2.5 Mg/3


 Ml Vial.neb  1 Vial


 NEB PRN Q4HRS


   9/10/19 Reported


 


 Acetaminophen 500


 Mg Tablet  650 Mg


 PO PRN Q4HRS PRN


   9/10/19 Reported


 


 Zolpidem Tartrate


 5 Mg Tablet  5 Mg


 PO PRN QHS PRN


   9/10/19 Reported


 


 Miralax


  (Polyethylene


 Glycol 3350) 17


 Gm Powd.pack  1 Packet


 PO DAILY PRN


   9/10/19 Reported


 


 Morphine Sulfate


 15 Mg Tablet  1 Tab


 PO QID PRN


   9/10/19 Reported


 


 Remedy Phytoplex


 Antifungal


  (Miconazole


 Nitrate) 71 Gm


 Oint...g.  71 Gm


 TP PRN BID PRN


   9/10/19 Reported


 


 Miconazole


 Nitrate 45 Gm


 Cream.appl  45 Gm


 VG PRN Q24HRS PRN


   9/10/19 Reported


 


 Melatonin 3 Mg


 Tablet  3 Mg


 PO QHS PRN


   9/10/19 Reported


 


 Imodium A-D


  (Loperamide Hcl)


 1 Mg/7.5 Ml Liquid  2 Mg


 PO PRN Q2HR PRN


   9/10/19 Reported


 


 Lomotil Tablet


  (Diphenoxylate


 Hcl/Atropine) 1


 Each Tablet  1 Tab


 PO QID


   9/10/19 Reported


 


 Lexapro


  (Escitalopram


 Oxalate) 20 Mg


 Tablet  1 Tab


 PO DAILY


   9/10/19 Reported


 


 Cyanocobalamin


 Injection


  (Cyanocobalamin


  (Vitamin B-12))


 1,000 Mcg/1 Ml


 Vial  1 Ml


 IM QMONTH


   2/16/19 Reported


 


 Oxycodone Hcl 5


 Mg Capsule  10 Mg


 PO PRN Q4HRS PRN


   2/16/19 Reported


 


 Zofran


  (Ondansetron Hcl)


 4 Mg Tablet  4 Mg


 IVP PRN Q4HRS PRN


   2/16/19 Reported


 


 Ferrous Sulfate


 325 Mg Tablet  1 Tab


 PO BID


   2/16/19 Reported


 


 Acidophilus


  (Lactobacillus


 Acidophilus) 1


 Each Capsule  1 Each


 PO BID


   2/16/19 Reported


 


 Protonix


  (Pantoprazole


 Sodium) 20 Mg


 Tablet.dr  40 Mg


 PO DAILY


   2/16/19 Reported











Impression


.


1.  Acute respiratory failure, required BIPAP on admit. off now.


combination of respiratory and metabolic acidosis favoring more


metabolic component.  This is secondary to sepsis.


2.  High-grade fever.  The patient has history of central line associated


bloodstream infection and I suspect that this is the likely source.  She has


been on total parenteral nutrition chronically due to extensive abdominal


surgeries.


3.  Metabolic acidosis secondary to lactic acidosis.


4.  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


peripherally inserted central line since 08/2018.


5.  Chronic oxygen-dependent chronic obstructive pulmonary disease.


6.  Obstructive sleep apnea, on CPAP at home. not compliant.


7.  History of acute pancreatitis with pseudocyst.


8.  Protein-calorie malnutrition, mild to moderate.


9.  Mild splenomegaly.


10.  Anemia.


11.  Morbid obesity.


12. suspected severe COPD





Plan


.





1.  Continue with prn BiPAP.


2.  ABGs.with compensated respiratory acidosis


3.  Gram positive bacteremia


4.  Broad-spectrum antibiotic per Infectious Disease.


5.  Follow all blood cultures.


6.  Clinically, less likely pneumonia.  CT findings may be related to


atelectasis, but we will monitor.


7.  Deep vein thrombosis prophylaxis.


8.  Stress ulcer prophylaxis.


9.  Discussed with RN and RT.  We will follow along with you.











YAJAIRA EDMOND MD                 Sep 11, 2019 11:19

## 2019-09-11 NOTE — NUR
Pharmacy TPN Dosing Note



S: VAL NOLASCO is a 59 year old F Currently receiving Central Continuous TPN started 



B:Pertinent PMH: 

enterocutaneous fistula and ileocolonic fistula

Height: 5 feet, 4 inches

Weight: 131.345340 kg



Current diet: NPO 



LABS:

Sodium:    146 

Potassium: 3.7 

Chloride:  110 

Calcium:   9.0 

Corrected Calcium: 10.60 

Magnesium: 1.9 

CO2:       31 

SCr:       0.8 

Glucose:   132 

Albumin:   2.0 

AST:       18 

ALT:       25 



TPN FORMULA:

TPN TYPE:  Central Continuous

AMINO ACIDS:         100 gm

DEXTROSE:            150 gm

LIPIDS:              30 gm

SODIUM CHLORIDE:     210 mEq

SODIUM ACETATE:      mEq

SODIUM PHOSPHATE:    15 mmol

POTASSIUM CHLORIDE:  40 mEq

POTASSIUM ACETATE:   mEq

POTASSIUM PHOSPHATE: mmol

MAGNESIUM:           16 mEq

CALCIUM:             10 mEq

INSULIN:             units

MULTIPLE VITAMIN:    10 ml

TRACE ELEMENTS:      1 ml(s)



TPN PLAN:  

K down a little, Na up. No changes today, will watch trend. Labs in AM.





R: Continue TPN AS ABOVE.

Will monitor electrolytes, glucose, and tolerance to TPN.



 SATURNINO DUKE [facility], 09/11/19 [id number]

## 2019-09-11 NOTE — NUR
[id number] drip note: Levophed drip adjusted throughout shift with the assistance from ICU 
staff - Lucia Del Valle and KYRIE Wren.

## 2019-09-11 NOTE — PN
DATE:  09/11/2019



SUBJECTIVE:  The patient is resting flat, comfortably in bed, no apparent

distress.  She is on BiPAP machine, maintaining her oxygen saturation at 98% on

FiO2 of 28%.  She is afebrile and her pain is much better controlled according

to her.



PHYSICAL EXAMINATION:

GENERAL:  When I examined her, she was pale, but no jaundice, cyanosis or

thyromegaly.  No jugular venous distention or limb edema.

VITAL SIGNS:  Her heart rate was 94, blood pressure was 107/55, temperature was

99.4, respiratory rate was 22 and oxygen saturation was 98% on BiPAP machine

with an FiO2 of 28%.

HEAD, EYES, EARS, NOSE AND THROAT:  Showed normocephalic, atraumatic.

NECK:  Supple.

HEART:  Showed normal first and second heart sounds with no gallop, rub or

murmur.

CHEST:  Clear to auscultation.  No crepitation or rhonchi.

ABDOMEN:  Distended, soft, nontender.  No guarding or rigidity.  No

organomegaly.  All hernial orifices intact.  Bowel sounds normal.  She has an

enterocutaneous fistula in the left quadrant with surrounding erythema and

cellulitis.

NEUROLOGIC:  She is awake, alert, responding appropriately.  All cranial nerves

intact.  She moves extremities without difficulty.



Her intake over the last 24 hours was 3700, output was 585.



LABORATORY DATA:  As of this morning showed a white cell count of 8100,

hemoglobin 8.6, hematocrit 27.6, MCV 83 and platelet count 235,000.  Her

chemistry is still pending at the time of this dictation.  Her blood gases as of

yesterday showed a pH of 7.33, pCO2 of 47, pO2 of 78, bicarbonate 24, oxygen

saturation was 95% on FiO2 of 28%.  Today's blood gases are still pending at the

time of this dictation.



ASSESSMENT:

1.  Severe sepsis with multiple potential sources including:

A.  Abdominal wall cellulitis.

B.  Line-related sepsis for which her PICC line was removed.  She is known to

have bilateral renal stones and possible healthcare-associated pneumonia.  The

patient is now on IV vancomycin, meropenem and micafungin as she is ALLERGIC TO

PENICILLIN.  She also was hypotensive and she was treated with IV fluid as well

as IV Levophed to maintain her mean arterial pressure of more than 65 mmHg.

2.  Has enterocutaneous fistula for which she is on TPN.

3.  Other medical problems include:

A.  Morbid obesity.

B.  Obstructive sleep apnea.

C.  Hypertension.

D.  Chronic obstructive pulmonary disease.

E.  Chronic hypoxic hypercapnic respiratory failure.

F.  Generalized osteoarthritis.

G.  Anemia of chronic disease.



PLAN:  To continue with nutritional support in the form of TPN, continue with

BiPAP for her hypoxic respiratory failure.  Continue with antibiotic.  Continue

with wound care.

 



______________________________

RAISA HURLEY MD



DR:  ALIYA/chapincito  JOB#:  [id number] / [id number]

DD:  09/11/2019 09:01  DT:  09/11/2019 09:25

## 2019-09-11 NOTE — PDOC
Infectious Disease Note


Subjective:


Subjective


pt is alert ,awake


says has some abdo discomfort


on bipap





ROS:


ROS


Negative otherwise.





Vital Signs:


Vital Signs





Vital Signs








  Date Time  Temp Pulse Resp B/P (MAP) Pulse Ox O2 Delivery O2 Flow Rate FiO2


 


9/11/19 05:54  100 24 101/65 (77) 98 BiPAP/CPAP  


 


9/11/19 03:00 99.6       





 99.6       











Physical Exam:


PHYSICAL EXAM


GENERAL:  Patient is lying in bed, on BiPAP, alert, appears tired.


HEENT:  No icterus; on BiPAP.


NECK:  Supple.


LUNGS:  Clear anteriorly.


HEART:  S1, S2, tachycardia.  No murmurs.


ABDOMEN:  Obese, soft, bowel sounds present.  Two stomas with surrounding


excoriation, redness, tenderness.


EXTREMITIES:  No edema or cyanosis.


DERMATOLOGIC:  Warm and dry.  No generalized rash.


NEUROLOGIC:  Alert and oriented x 3.


LINES:  Right upper extremity PICC line without signs of complication.





Medications:


Inpatient Meds:





Current Medications








 Medications


  (Trade)  Dose


 Ordered  Sig/Laura  Start Time


 Stop Time Status Last Admin


Dose Admin


 


 Acetaminophen


  (Tylenol Supp)  650 mg  1X  ONCE  9/9/19 23:45


 9/9/19 23:46 DC 9/10/19 00:28


650 MG


 


 Acetaminophen


  (Tylenol)  650 mg  PRN Q4HRS  PRN  9/10/19 19:45


     





 


 Albuterol/


 Ipratropium


  (Duoneb)  3 ml  RTQID  9/10/19 08:00


 9/11/19 07:59  9/10/19 19:31


3 ML


 


 Aztreonam


  (Azactam)  2 gm  1X  ONCE  9/9/19 22:30


 9/9/19 22:31 DC 9/9/19 22:51


2 GM


 


 Dexamethasone


 Sodium Phosphate


  (Decadron)  10 mg  1X  ONCE  9/9/19 22:30


 9/9/19 22:31 DC 9/9/19 23:58


10 MG


 


 Enoxaparin Sodium


  (Lovenox 40mg


 Syringe)  40 mg  Q24H  9/10/19 12:00


    9/10/19 16:55


40 MG


 


 Fentanyl Citrate


  (Fentanyl 2ml


 Vial)  25 mcg  PRN Q4HRS  PRN  9/10/19 16:45


    9/11/19 02:28


25 MCG


 


 Info


  (CONTRAST GIVEN


 -- Rx MONITORING)  1 each  PRN DAILY  PRN  9/9/19 23:30


 9/11/19 23:29   





 


 Info


  (Tpn Per


 Pharmacy)  1 each  PRN DAILY  PRN  9/10/19 15:15


    9/10/19 16:14


1 EACH


 


 Iohexol


  (Omnipaque 350


 Mg/ml)  100 ml  1X  ONCE  9/9/19 23:30


 9/9/19 23:31 DC 9/9/19 23:37


100 ML


 


 Levofloxacin/


 Dextrose  150 ml @ 


 100 mls/hr  1X  ONCE  9/10/19 01:00


 9/10/19 02:29 DC 9/10/19 03:44


100 MLS/HR


 


 Magnesium Sulfate  50 ml @ 25


 mls/hr  1X  ONCE  9/10/19 01:00


 9/10/19 02:59 DC 9/10/19 03:44


25 MLS/HR


 


 Meropenem 500 mg/


 Sodium Chloride  50 ml @ 


 100 mls/hr  Q6HRS  9/10/19 08:30


    9/11/19 06:11


100 MLS/HR


 


 Micafungin Sodium


 100 mg/Dextrose  100 ml @ 


 100 mls/hr  Q24H  9/10/19 09:00


    9/10/19 10:05


100 MLS/HR


 


 Norepinephrine


 Bitartrate  250 ml @ 


 24.032 mls/


 hr  CONT  PRN  9/10/19 05:15


    9/10/19 18:10


24.032 MLS/HR


 


 Nystatin


  (Nystop)  1 cathie  BID  9/10/19 21:00


    9/10/19 19:51


1 CATHIE


 


 Ondansetron HCl


  (Zofran)  4 mg  PRN Q8HRS  PRN  9/10/19 01:15


 9/11/19 01:14 DC  





 


 Sodium Bicarbonate


  (Sodium Bicarb


 Adult 8.4% Syr)  50 meq  1X  ONCE  9/10/19 14:00


 9/10/19 14:01 DC 9/10/19 14:02


50 MEQ


 


 Sodium Chloride


 210 meq/Sodium


 Phosphate 15 mmol/


 Potassium


 Chloride 40 meq/


 Magnesium Sulfate


 16 meq/Calcium


 Gluconate 10 meq/


 Multivitamins 10


 ml/Chromium/


 Copper/Manganese/


 Seleni/Zn 1 ml/


 Total Parenteral


 Nutrition/Amino


 Acids/Dextrose/


 Fat Emulsion


 Intravenous  1,500 ml @ 


 62.5 mls/hr  TPN  CONT  9/10/19 22:00


 9/11/19 21:59  9/10/19 21:52


62.5 MLS/HR


 


 Vancomycin HCl


  (Vanco Per


 Pharmacy)  1 each  PRN DAILY  PRN  9/10/19 08:30


    9/10/19 09:00


1 EACH


 


 Vancomycin HCl


  (Vancomycin


 Trough Level)  1 each  1X  ONCE  9/12/19 07:30


 9/12/19 07:31   





 


 Vancomycin HCl


 1.5 gm/Sodium


 Chloride  500 ml @ 


 250 mls/hr  Q18H  9/10/19 20:00


    9/10/19 19:51


250 MLS/HR


 


 Vancomycin HCl 2


 gm/Sodium Chloride  500 ml @ 


 250 mls/hr  1X  ONCE  9/10/19 01:15


 9/10/19 03:14 DC 9/10/19 01:30


250 MLS/HR











Labs:


Lab





Laboratory Tests








Test


 9/10/19


07:27 9/10/19


07:55 9/10/19


13:40 9/11/19


06:00


 


Troponin I Quantitative


 < 0.017 ng/mL


(0.000-0.055) 


 


 





 


O2 Saturation  98 % (92-99)  95 % (92-99)  


 


Arterial Blood pH


 


 7.25


(7.35-7.45) 7.33


(7.35-7.45) 





 


Arterial Blood pCO2 at


Patient Temp 


 52 mmHg


(35-46) 47 mmHg


(35-46) 





 


Arterial Blood pO2 at Patient


Temp 


 108 mmHg


() 78 mmHg


() 





 


Arterial Blood HCO3


 


 22 mmol/L


(21-28) 24 mmol/L


(21-28) 





 


Arterial Blood Base Excess


 


 -5 mmol/L


(-3-3) -2 mmol/L


(-3-3) 





 


FiO2  40  28  


 


White Blood Count


 


 


 


 8.1 x10^3/uL


(4.0-11.0)


 


Red Blood Count


 


 


 


 3.32 x10^6/uL


(3.50-5.40)


 


Hemoglobin


 


 


 


 8.6 g/dL


(12.0-15.5)


 


Hematocrit


 


 


 


 27.6 %


(36.0-47.0)


 


Mean Corpuscular Volume    83 fL () 


 


Mean Corpuscular Hemoglobin    26 pg (25-35) 


 


Mean Corpuscular Hemoglobin


Concent 


 


 


 31 g/dL


(31-37)


 


Red Cell Distribution Width


 


 


 


 18.3 %


(11.5-14.5)


 


Platelet Count


 


 


 


 235 x10^3/uL


(140-400)











Objective:


Assessment:





 Sepsis


 Bacteremia POA 1/4  GRAM POSITIVE COCCI IN PAIRS, CHAINS, AND CLUSTERS,


 Fever, can be central line-associated bloodstream infection or abdominal


wall cellulitis.


 Lactic acidosis.


 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


peripherally inserted central line since 08/2018.


   History of acute pancreatitis with pseudocyst.


   Chronic obstructive pulmonary disease.


   Protein-calorie malnutrition.


  Mild splenomegaly.


   Anemia.


  Nursing home resident.


 .Morbid obesity.





Plan:


Plan of Care


cont vancomycin., meropenem and micafungin.


monitor renal functions


    Status post one dose of Levaquin and aztreonam.


Continue to monitor labs and kidney functions closely.


 Wound care per wound team


Follow up cultures.


Continue supportive care.


 PICC line removed,f/u catheter tip for cultures.


f/u repeat bc neg so far


D/W SCHUYLER TALLEY MD           Sep 11, 2019 07:13

## 2019-09-12 VITALS — DIASTOLIC BLOOD PRESSURE: 53 MMHG | SYSTOLIC BLOOD PRESSURE: 116 MMHG

## 2019-09-12 VITALS — SYSTOLIC BLOOD PRESSURE: 118 MMHG | DIASTOLIC BLOOD PRESSURE: 62 MMHG

## 2019-09-12 VITALS — SYSTOLIC BLOOD PRESSURE: 107 MMHG | DIASTOLIC BLOOD PRESSURE: 55 MMHG

## 2019-09-12 VITALS — DIASTOLIC BLOOD PRESSURE: 55 MMHG | SYSTOLIC BLOOD PRESSURE: 105 MMHG

## 2019-09-12 VITALS — DIASTOLIC BLOOD PRESSURE: 58 MMHG | SYSTOLIC BLOOD PRESSURE: 110 MMHG

## 2019-09-12 VITALS — DIASTOLIC BLOOD PRESSURE: 56 MMHG | SYSTOLIC BLOOD PRESSURE: 107 MMHG

## 2019-09-12 VITALS — DIASTOLIC BLOOD PRESSURE: 62 MMHG | SYSTOLIC BLOOD PRESSURE: 114 MMHG

## 2019-09-12 VITALS — SYSTOLIC BLOOD PRESSURE: 124 MMHG | DIASTOLIC BLOOD PRESSURE: 68 MMHG

## 2019-09-12 VITALS — DIASTOLIC BLOOD PRESSURE: 55 MMHG | SYSTOLIC BLOOD PRESSURE: 93 MMHG

## 2019-09-12 VITALS — SYSTOLIC BLOOD PRESSURE: 111 MMHG | DIASTOLIC BLOOD PRESSURE: 61 MMHG

## 2019-09-12 VITALS — SYSTOLIC BLOOD PRESSURE: 149 MMHG | DIASTOLIC BLOOD PRESSURE: 70 MMHG

## 2019-09-12 VITALS — DIASTOLIC BLOOD PRESSURE: 60 MMHG | SYSTOLIC BLOOD PRESSURE: 98 MMHG

## 2019-09-12 VITALS — DIASTOLIC BLOOD PRESSURE: 55 MMHG | SYSTOLIC BLOOD PRESSURE: 163 MMHG

## 2019-09-12 VITALS — SYSTOLIC BLOOD PRESSURE: 112 MMHG | DIASTOLIC BLOOD PRESSURE: 63 MMHG

## 2019-09-12 VITALS — DIASTOLIC BLOOD PRESSURE: 48 MMHG | SYSTOLIC BLOOD PRESSURE: 100 MMHG

## 2019-09-12 VITALS — DIASTOLIC BLOOD PRESSURE: 48 MMHG | SYSTOLIC BLOOD PRESSURE: 92 MMHG

## 2019-09-12 VITALS — SYSTOLIC BLOOD PRESSURE: 121 MMHG | DIASTOLIC BLOOD PRESSURE: 70 MMHG

## 2019-09-12 VITALS — SYSTOLIC BLOOD PRESSURE: 96 MMHG | DIASTOLIC BLOOD PRESSURE: 44 MMHG

## 2019-09-12 VITALS — DIASTOLIC BLOOD PRESSURE: 54 MMHG | SYSTOLIC BLOOD PRESSURE: 96 MMHG

## 2019-09-12 VITALS — SYSTOLIC BLOOD PRESSURE: 93 MMHG | DIASTOLIC BLOOD PRESSURE: 49 MMHG

## 2019-09-12 VITALS — DIASTOLIC BLOOD PRESSURE: 60 MMHG | SYSTOLIC BLOOD PRESSURE: 128 MMHG

## 2019-09-12 LAB
ANION GAP SERPL CALC-SCNC: 5 MMOL/L (ref 6–14)
BASE EXCESS ABG: -4 MMOL/L (ref -3–3)
BUN SERPL-MCNC: 18 MG/DL (ref 7–20)
CALCIUM SERPL-MCNC: 8.3 MG/DL (ref 8.5–10.1)
CHLORIDE SERPL-SCNC: 111 MMOL/L (ref 98–107)
CO2 SERPL-SCNC: 30 MMOL/L (ref 21–32)
CREAT SERPL-MCNC: 0.7 MG/DL (ref 0.6–1)
GFR SERPLBLD BASED ON 1.73 SQ M-ARVRAT: 85.9 ML/MIN
GLUCOSE SERPL-MCNC: 131 MG/DL (ref 70–99)
HCO3 BLDA-SCNC: 22 MMOL/L (ref 21–28)
INSPIRATION SETTING TIME VENT: 28
MAGNESIUM SERPL-MCNC: 1.9 MG/DL (ref 1.8–2.4)
PCO2 BLDA: 40 MMHG (ref 35–46)
PHOSPHATE SERPL-MCNC: 3.3 MG/DL (ref 2.6–4.7)
PO2 BLDA: 91 MMHG (ref 75–108)
POTASSIUM SERPL-SCNC: 4 MMOL/L (ref 3.5–5.1)
SAO2 % BLDA: 96 % (ref 92–99)
SODIUM SERPL-SCNC: 146 MMOL/L (ref 136–145)
VANC TR: 10.2 MCG/ML (ref 10–20)

## 2019-09-12 PROCEDURE — 5A09357 ASSISTANCE WITH RESPIRATORY VENTILATION, LESS THAN 24 CONSECUTIVE HOURS, CONTINUOUS POSITIVE AIRWAY PRESSURE: ICD-10-PCS

## 2019-09-12 RX ADMIN — MEROPENEM SCH MLS/HR: 500 INJECTION, POWDER, FOR SOLUTION INTRAVENOUS at 00:25

## 2019-09-12 RX ADMIN — VANCOMYCIN HYDROCHLORIDE SCH MLS/HR: 1 INJECTION, POWDER, FOR SOLUTION INTRAVENOUS at 08:01

## 2019-09-12 RX ADMIN — Medication SCH CAP: at 20:08

## 2019-09-12 RX ADMIN — IPRATROPIUM BROMIDE AND ALBUTEROL SULFATE SCH ML: .5; 3 SOLUTION RESPIRATORY (INHALATION) at 15:40

## 2019-09-12 RX ADMIN — DEXTROSE SCH MLS/HR: 50 INJECTION, SOLUTION INTRAVENOUS at 08:00

## 2019-09-12 RX ADMIN — FENTANYL CITRATE PRN MCG: 50 INJECTION INTRAMUSCULAR; INTRAVENOUS at 18:26

## 2019-09-12 RX ADMIN — IPRATROPIUM BROMIDE AND ALBUTEROL SULFATE SCH ML: .5; 3 SOLUTION RESPIRATORY (INHALATION) at 07:36

## 2019-09-12 RX ADMIN — Medication PRN EACH: at 11:12

## 2019-09-12 RX ADMIN — Medication SCH CAP: at 07:58

## 2019-09-12 RX ADMIN — FENTANYL CITRATE PRN MCG: 50 INJECTION INTRAMUSCULAR; INTRAVENOUS at 16:18

## 2019-09-12 RX ADMIN — MEROPENEM SCH MLS/HR: 500 INJECTION, POWDER, FOR SOLUTION INTRAVENOUS at 05:27

## 2019-09-12 RX ADMIN — FENTANYL CITRATE PRN MCG: 50 INJECTION INTRAMUSCULAR; INTRAVENOUS at 22:07

## 2019-09-12 RX ADMIN — VANCOMYCIN HYDROCHLORIDE PRN EACH: 1 INJECTION, POWDER, LYOPHILIZED, FOR SOLUTION INTRAVENOUS at 10:46

## 2019-09-12 RX ADMIN — ACETAMINOPHEN PRN MG: 325 TABLET, FILM COATED ORAL at 03:29

## 2019-09-12 RX ADMIN — NYSTATIN SCH APP: 100000 POWDER TOPICAL at 22:06

## 2019-09-12 RX ADMIN — MEROPENEM SCH MLS/HR: 500 INJECTION, POWDER, FOR SOLUTION INTRAVENOUS at 17:41

## 2019-09-12 RX ADMIN — ACETAMINOPHEN PRN MG: 325 TABLET, FILM COATED ORAL at 07:58

## 2019-09-12 RX ADMIN — NYSTATIN SCH APP: 100000 POWDER TOPICAL at 07:58

## 2019-09-12 RX ADMIN — FENTANYL CITRATE PRN MCG: 50 INJECTION INTRAMUSCULAR; INTRAVENOUS at 12:48

## 2019-09-12 RX ADMIN — MEROPENEM SCH MLS/HR: 500 INJECTION, POWDER, FOR SOLUTION INTRAVENOUS at 11:23

## 2019-09-12 RX ADMIN — VANCOMYCIN HYDROCHLORIDE SCH MLS/HR: 1 INJECTION, POWDER, FOR SOLUTION INTRAVENOUS at 11:19

## 2019-09-12 RX ADMIN — ENOXAPARIN SODIUM SCH MG: 40 INJECTION SUBCUTANEOUS at 20:08

## 2019-09-12 RX ADMIN — IPRATROPIUM BROMIDE AND ALBUTEROL SULFATE SCH ML: .5; 3 SOLUTION RESPIRATORY (INHALATION) at 11:57

## 2019-09-12 RX ADMIN — BACITRACIN SCH MLS/HR: 5000 INJECTION, POWDER, FOR SOLUTION INTRAMUSCULAR at 05:26

## 2019-09-12 RX ADMIN — IPRATROPIUM BROMIDE AND ALBUTEROL SULFATE SCH ML: .5; 3 SOLUTION RESPIRATORY (INHALATION) at 19:56

## 2019-09-12 RX ADMIN — ENOXAPARIN SODIUM SCH MG: 40 INJECTION SUBCUTANEOUS at 07:59

## 2019-09-12 NOTE — RAD
Portable chest x-ray without comparison for resuming BiPAP.

 

FINDINGS: There are coarse interstitial changes throughout both lungs 

which could reflect interstitial infiltrates and/or pulmonary edema. There

is cardiomegaly. Bilateral pleural effusions are present. Right PICC line 

is present.

 

IMPRESSION:

1. Findings concerning for congestive heart failure with interstitial 

pulmonary edema and bilateral pleural effusions.

 

Electronically signed by: Shaquille Bacon MD (9/12/2019 3:54 PM) [facility]

## 2019-09-12 NOTE — NUR
Pharmacy Vancomycin Dosing Note



S:Consulted to monitor and dose vancomycin started 09/10/19.



O:VAL NOLASCO is a 59 year old F with abdominal wall cellulitis and bacteremia. 



Height: 5 feet, 4 inches

Weight: 131.350234 kg

Ideal Body Weight: 54.70 

Adjusted Body Weight: 85.22 

Dosing Weight: Actual



Other Antibiotics: 

Merrem 500mg q6h

Mycamine 100mg q24h



LABS:

Last BUN: 18 

Last Creatinine: 0.7 

Creatinine Clearance: > 100 mL/min

Last WBC: 8.1 

Last Procalcitonin: - 

Tmax (past 24 hours): 99.6 



Microbiology: 

9/11 GPC PAIRS/CLUSTERS BCX. CATH TIP SENT FOR CX

9/11 REPEAT BC NGTD X 1 DAY



I/O: 7353/2805 (1955 ml UOP) 



Drug Levels:

Last Trough level: 10.2  on 09/12/19 at 0855 

Last dose given 09/11/19 at 1503 



Vancomycin Dosing:

Loading Dose: 2000 mg x1

Dosing Weight: Actual

Target Trough: 15-20





A: Based on: Patient's PMH, trough level, renal function, culture results and severity of 
infection





P: 1. Dose increased to Vancomycin 1750 mg IV q18h

   2. Follow up Trough level on 09/14/19 at 1630 

   3. Pharmacy will continue to monitor, follow and adjust therapy as needed.

 

 JARON WOMACK KENDALL, 09/12/19 1100

## 2019-09-12 NOTE — PDOC
PULMONARY PROGRESS NOTES


Subjective


Back on BIPAP this am


ABG  improved


Vitals





Vital Signs








  Date Time  Temp Pulse Resp B/P (MAP) Pulse Ox O2 Delivery O2 Flow Rate FiO2


 


9/12/19 11:58     98 BiPAP/CPAP  


 


9/12/19 09:00  94 23 116/53 (74)   3.0 


 


9/12/19 04:00 98.0       





 98.0       








ROS:  No Chest Pain, No Increase Cough


General:  No acute distress


Lungs:  Clear


Abdomen:  Soft, Other (obese,colostomy in place)


Extremities:  Other (1+edema)


Skin:  Warm, Dry


Labs





Laboratory Tests








Test


 9/10/19


13:40 9/10/19


17:40 9/11/19


06:00 9/11/19


07:30


 


O2 Saturation 95 % (92-99)    


 


Arterial Blood pH


 7.33


(7.35-7.45) 


 


 





 


Arterial Blood pCO2 at


Patient Temp 47 mmHg


(35-46) 


 


 





 


Arterial Blood pO2 at Patient


Temp 78 mmHg


() 


 


 





 


Arterial Blood HCO3


 24 mmol/L


(21-28) 


 


 





 


Arterial Blood Base Excess


 -2 mmol/L


(-3-3) 


 


 





 


FiO2 28    


 


Nasal Screen MRSA (PCR)


 


 Positive


(Negative) 


 





 


White Blood Count


 


 


 8.1 x10^3/uL


(4.0-11.0) 





 


Red Blood Count


 


 


 3.32 x10^6/uL


(3.50-5.40) 





 


Hemoglobin


 


 


 8.6 g/dL


(12.0-15.5) 





 


Hematocrit


 


 


 27.6 %


(36.0-47.0) 





 


Mean Corpuscular Volume   83 fL ()  


 


Mean Corpuscular Hemoglobin   26 pg (25-35)  


 


Mean Corpuscular Hemoglobin


Concent 


 


 31 g/dL


(31-37) 





 


Red Cell Distribution Width


 


 


 18.3 %


(11.5-14.5) 





 


Platelet Count


 


 


 235 x10^3/uL


(140-400) 





 


Phosphorus Level


 


 


 


 3.2 mg/dL


(2.6-4.7)


 


Magnesium Level


 


 


 


 1.9 mg/dL


(1.8-2.4)


 


Triglycerides Level


 


 


 


 136 mg/dL


(0-150)


 


Test


 9/11/19


08:00 9/11/19


09:55 9/12/19


08:55 9/12/19


11:50


 


O2 Saturation 95 % (92-99)    96 % (92-99) 


 


Arterial Blood pH


 7.37


(7.35-7.45) 


 


 7.35


(7.35-7.45)


 


Arterial Blood pCO2 at


Patient Temp 48 mmHg


(35-46) 


 


 40 mmHg


(35-46)


 


Arterial Blood pO2 at Patient


Temp 80 mmHg


() 


 


 91 mmHg


()


 


Arterial Blood HCO3


 27 mmol/L


(21-28) 


 


 22 mmol/L


(21-28)


 


Arterial Blood Base Excess


 1 mmol/L


(-3-3) 


 


 -4 mmol/L


(-3-3)


 


FiO2 28    28 


 


Sodium Level


 


 146 mmol/L


(136-145) 146 mmol/L


(136-145) 





 


Potassium Level


 


 3.7 mmol/L


(3.5-5.1) 4.0 mmol/L


(3.5-5.1) 





 


Chloride Level


 


 110 mmol/L


() 111 mmol/L


() 





 


Carbon Dioxide Level


 


 31 mmol/L


(21-32) 30 mmol/L


(21-32) 





 


Anion Gap  5 (6-14)  5 (6-14)  


 


Blood Urea Nitrogen


 


 20 mg/dL


(7-20) 18 mg/dL


(7-20) 





 


Creatinine


 


 0.8 mg/dL


(0.6-1.0) 0.7 mg/dL


(0.6-1.0) 





 


Estimated GFR


(Cockcroft-Gault) 


 73.7 


 85.9 


 





 


BUN/Creatinine Ratio  25 (6-20)   


 


Glucose Level


 


 132 mg/dL


(70-99) 131 mg/dL


(70-99) 





 


Calcium Level


 


 9.0 mg/dL


(8.5-10.1) 8.3 mg/dL


(8.5-10.1) 





 


Total Bilirubin


 


 0.2 mg/dL


(0.2-1.0) 


 





 


Aspartate Amino Transf


(AST/SGOT) 


 18 U/L (15-37) 


 


 





 


Alanine Aminotransferase


(ALT/SGPT) 


 25 U/L (14-59) 


 


 





 


Alkaline Phosphatase


 


 113 U/L


() 


 





 


Total Protein


 


 6.4 g/dL


(6.4-8.2) 


 





 


Albumin


 


 2.0 g/dL


(3.4-5.0) 


 





 


Albumin/Globulin Ratio  0.5 (1.0-1.7)   


 


Phosphorus Level


 


 


 3.3 mg/dL


(2.6-4.7) 





 


Magnesium Level


 


 


 1.9 mg/dL


(1.8-2.4) 





 


Vancomycin Level Trough


 


 


 10.2 mcg/mL


(10.0-20.0) 





 


Vancomycin Last Dose Date   09/11/19  


 


Vancomycin Last Dose Time   1400  








Laboratory Tests








Test


 9/12/19


08:55 9/12/19


11:50


 


Sodium Level


 146 mmol/L


(136-145) 





 


Potassium Level


 4.0 mmol/L


(3.5-5.1) 





 


Chloride Level


 111 mmol/L


() 





 


Carbon Dioxide Level


 30 mmol/L


(21-32) 





 


Anion Gap 5 (6-14)  


 


Blood Urea Nitrogen


 18 mg/dL


(7-20) 





 


Creatinine


 0.7 mg/dL


(0.6-1.0) 





 


Estimated GFR


(Cockcroft-Gault) 85.9 


 





 


Glucose Level


 131 mg/dL


(70-99) 





 


Calcium Level


 8.3 mg/dL


(8.5-10.1) 





 


Phosphorus Level


 3.3 mg/dL


(2.6-4.7) 





 


Magnesium Level


 1.9 mg/dL


(1.8-2.4) 





 


Vancomycin Level Trough


 10.2 mcg/mL


(10.0-20.0) 





 


Vancomycin Last Dose Date 09/11/19  


 


Vancomycin Last Dose Time 1400  


 


O2 Saturation  96 % (92-99) 


 


Arterial Blood pH


 


 7.35


(7.35-7.45)


 


Arterial Blood pCO2 at


Patient Temp 


 40 mmHg


(35-46)


 


Arterial Blood pO2 at Patient


Temp 


 91 mmHg


()


 


Arterial Blood HCO3


 


 22 mmol/L


(21-28)


 


Arterial Blood Base Excess


 


 -4 mmol/L


(-3-3)


 


FiO2  28 








Medications





Active Scripts








 Medications  Dose


 Route/Sig


 Max Daily Dose Days Date Category


 


 Multivitamins


  (Multivitamin) 1


 Each Tablet  1 Tab


 PO DAILY


   9/10/19 Reported


 


 Vitamin D2


  (Ergocalciferol


  (Vitamin D2))


 50,000 Unit


 Capsule  50,000 Unit


 PO WEEKLY


   9/10/19 Reported


 


 Lovenox


  (Enoxaparin


 Sodium) 120


 Mg/0.8 Ml


 Disp.syrin  120 Mg


 SQ BID


   9/10/19 Reported


 


 Cyanocobalamin


 Injection


  (Cyanocobalamin


  (Vitamin B-12))


 1,000 Mcg/1 Ml


 Vial  1 Ml


 IM QMONTH


   9/10/19 Reported


 


 Calcium Carbonate


 500 Mg/5 Ml


 Oral.susp  500 Mg


 PO PRN Q12HRS PRN


   9/10/19 Reported


 


 Albuterol Sulfate


 Neb Soln


  (Albuterol


 Sulfate) 2.5 Mg/3


 Ml Vial.neb  1 Vial


 NEB PRN Q4HRS


   9/10/19 Reported


 


 Acetaminophen 500


 Mg Tablet  650 Mg


 PO PRN Q4HRS PRN


   9/10/19 Reported


 


 Zolpidem Tartrate


 5 Mg Tablet  5 Mg


 PO PRN QHS PRN


   9/10/19 Reported


 


 Miralax


  (Polyethylene


 Glycol 3350) 17


 Gm Powd.pack  1 Packet


 PO DAILY PRN


   9/10/19 Reported


 


 Morphine Sulfate


 15 Mg Tablet  1 Tab


 PO QID PRN


   9/10/19 Reported


 


 Remedy Phytoplex


 Antifungal


  (Miconazole


 Nitrate) 71 Gm


 Oint...g.  71 Gm


 TP PRN BID PRN


   9/10/19 Reported


 


 Miconazole


 Nitrate 45 Gm


 Cream.appl  45 Gm


 VG PRN Q24HRS PRN


   9/10/19 Reported


 


 Melatonin 3 Mg


 Tablet  3 Mg


 PO QHS PRN


   9/10/19 Reported


 


 Imodium A-D


  (Loperamide Hcl)


 1 Mg/7.5 Ml Liquid  2 Mg


 PO PRN Q2HR PRN


   9/10/19 Reported


 


 Lomotil Tablet


  (Diphenoxylate


 Hcl/Atropine) 1


 Each Tablet  1 Tab


 PO QID


   9/10/19 Reported


 


 Lexapro


  (Escitalopram


 Oxalate) 20 Mg


 Tablet  1 Tab


 PO DAILY


   9/10/19 Reported


 


 Cyanocobalamin


 Injection


  (Cyanocobalamin


  (Vitamin B-12))


 1,000 Mcg/1 Ml


 Vial  1 Ml


 IM QMONTH


   2/16/19 Reported


 


 Oxycodone Hcl 5


 Mg Capsule  10 Mg


 PO PRN Q4HRS PRN


   2/16/19 Reported


 


 Zofran


  (Ondansetron Hcl)


 4 Mg Tablet  4 Mg


 IVP PRN Q4HRS PRN


   2/16/19 Reported


 


 Ferrous Sulfate


 325 Mg Tablet  1 Tab


 PO BID


   2/16/19 Reported


 


 Acidophilus


  (Lactobacillus


 Acidophilus) 1


 Each Capsule  1 Each


 PO BID


   2/16/19 Reported


 


 Protonix


  (Pantoprazole


 Sodium) 20 Mg


 Tablet.dr  40 Mg


 PO DAILY


   2/16/19 Reported











Impression


.


1.  Acute respiratory failure, required BIPAP on admit due to sepsis.


combination of respiratory and metabolic acidosis 


2.  High-grade fever.  The patient has history of central line associated


bloodstream infection and I suspect that this is the likely source.  She has


been on total parenteral nutrition chronically due to extensive abdominal


surgeries.


3.  Metabolic acidosis secondary to lactic acidosis.


4.  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


peripherally inserted central line since 08/2018.


5.  Chronic oxygen-dependent chronic obstructive pulmonary disease.


6.  Obstructive sleep apnea, on CPAP at home. not compliant.


7.  History of acute pancreatitis with pseudocyst.


8.  Protein-calorie malnutrition, mild to moderate.


9.  Mild splenomegaly.


10.  Anemia.


11.  Morbid obesity.


12. suspected severe COPD





Plan


.





1.  Continue with prn BiPAP.


2.  ABGs.with compensated respiratory acidosis


3.  Gram positive bacteremia


4.  Broad-spectrum antibiotic per Infectious Disease.


5.  Follow all blood cultures.


6.  Clinically, less likely pneumonia.  CT findings may be related to


atelectasis, but we will monitor.


7.  Deep vein thrombosis prophylaxis.


8.  Stress ulcer prophylaxis.


9.  Discussed with RN and RT.  We will follow along with you.











YAJAIRA EDMOND MD                 Sep 12, 2019 13:01

## 2019-09-12 NOTE — PDOC
Infectious Disease Note


Subjective:


Subjective


pt is alert ,awake


feeling better


d/w rn





ROS:


ROS


some abdo discomfort





Vital Signs:


Vital Signs





Vital Signs








  Date Time  Temp Pulse Resp B/P (MAP) Pulse Ox O2 Delivery O2 Flow Rate FiO2


 


9/12/19 06:00  109 17 107/56 (73) 93 Nasal Cannula 3.0 


 


9/12/19 04:00 98.0       





 98.0       











Physical Exam:


PHYSICAL EXAM


GENERAL:  Patient is lying in bed, on BiPAP, alert, appears tired.


HEENT:  No icterus; on BiPAP.


NECK:  Supple.


LUNGS:  Clear anteriorly.


HEART:  S1, S2, tachycardia.  No murmurs.


ABDOMEN:  Obese, soft, bowel sounds present.  Two stomas with surrounding


excoriation, redness, tenderness.


EXTREMITIES:  No edema or cyanosis.


DERMATOLOGIC:  Warm and dry.  No generalized rash.


NEUROLOGIC:  Alert and oriented x 3.


LINES:  Right upper extremity PICC line without signs of complication.





Medications:


Inpatient Meds:





Current Medications








 Medications


  (Trade)  Dose


 Ordered  Sig/[facility]  Start Time


 Stop Time Status Last Admin


Dose Admin


 


 Acetaminophen


  (Tylenol Supp)  650 mg  1X  ONCE  9/9/19 23:45


 9/9/19 23:46 DC 9/10/19 00:28


650 MG


 


 Acetaminophen


  (Tylenol)  650 mg  PRN Q4HRS  PRN  9/10/19 19:45


    9/12/19 03:29


650 MG


 


 Albuterol/


 Ipratropium


  (Duoneb)  3 ml  RTQID  9/11/19 16:00


    9/11/19 20:07


3 ML


 


 Aztreonam


  (Azactam)  2 gm  1X  ONCE  9/9/19 22:30


 9/9/19 22:31 DC 9/9/19 22:51


2 GM


 


 Dexamethasone


 Sodium Phosphate


  (Decadron)  10 mg  1X  ONCE  9/9/19 22:30


 9/9/19 22:31 DC 9/9/19 23:58


10 MG


 


 Enoxaparin Sodium


  (Lovenox 40mg


 Syringe)  40 mg  Q12HR  9/11/19 21:00


    9/11/19 21:24


40 MG


 


 Fentanyl Citrate


  (Fentanyl 2ml


 Vial)  25 mcg  PRN Q4HRS  PRN  9/10/19 16:45


    9/11/19 22:44


25 MCG


 


 Info


  (CONTRAST GIVEN


 -- Rx MONITORING)  1 each  PRN DAILY  PRN  9/9/19 23:30


 9/11/19 23:29 DC  





 


 Info


  (Tpn Per


 Pharmacy)  1 each  PRN DAILY  PRN  9/10/19 15:15


    9/11/19 13:28


1 EACH


 


 Iohexol


  (Omnipaque 350


 Mg/ml)  100 ml  1X  ONCE  9/9/19 23:30


 9/9/19 23:31 DC 9/9/19 23:37


100 ML


 


 Lactobacillus


 Rhamnosus


  (Culturelle)  1 cap  BID  9/11/19 21:00


    9/11/19 21:24


1 CAP


 


 Levofloxacin/


 Dextrose  150 ml @ 


 100 mls/hr  1X  ONCE  9/10/19 01:00


 9/10/19 02:29 DC 9/10/19 03:44


100 MLS/HR


 


 Magnesium Sulfate  50 ml @ 25


 mls/hr  1X  ONCE  9/10/19 01:00


 9/10/19 02:59 DC 9/10/19 03:44


25 MLS/HR


 


 Meropenem 500 mg/


 Sodium Chloride  50 ml @ 


 100 mls/hr  Q6HRS  9/10/19 08:30


    9/12/19 05:27


100 MLS/HR


 


 Micafungin Sodium


 100 mg/Dextrose  100 ml @ 


 100 mls/hr  Q24H  9/10/19 09:00


    9/11/19 09:41


100 MLS/HR


 


 Norepinephrine


 Bitartrate  250 ml @ 


 24.032 mls/


 hr  CONT  PRN  9/10/19 05:15


    9/10/19 18:10


24.032 MLS/HR


 


 Nystatin


  (Nystop)  1 cathie  BID  9/10/19 21:00


    9/11/19 22:44


1 CATHIE


 


 Ondansetron HCl


  (Zofran)  4 mg  PRN Q4HRS  PRN  9/11/19 07:45


    9/11/19 18:27


4 MG


 


 Sodium Bicarbonate


  (Sodium Bicarb


 Adult 8.4% Syr)  50 meq  1X  ONCE  9/10/19 14:00


 9/10/19 14:01 DC 9/10/19 14:02


50 MEQ


 


 Sodium Chloride


 210 meq/Sodium


 Phosphate 15 mmol/


 Potassium


 Chloride 40 meq/


 Magnesium Sulfate


 16 meq/Calcium


 Gluconate 10 meq/


 Multivitamins 10


 ml/Chromium/


 Copper/Manganese/


 Seleni/Zn 1 ml/


 Total Parenteral


 Nutrition/Amino


 Acids/Dextrose/


 Fat Emulsion


 Intravenous  1,500 ml @ 


 62.5 mls/hr  TPN  CONT  9/11/19 22:00


 9/12/19 21:59  9/11/19 22:44


62.5 MLS/HR


 


 Vancomycin HCl


  (Vanco Per


 Pharmacy)  1 each  PRN DAILY  PRN  9/10/19 08:30


    9/11/19 13:11


1 EACH


 


 Vancomycin HCl


  (Vancomycin


 Trough Level)  1 each  1X  ONCE  9/12/19 07:30


 9/12/19 07:31   





 


 Vancomycin HCl


 1.5 gm/Sodium


 Chloride  500 ml @ 


 250 mls/hr  Q18H  9/10/19 20:00


    9/11/19 15:03


250 MLS/HR


 


 Vancomycin HCl 2


 gm/Sodium Chloride  500 ml @ 


 250 mls/hr  1X  ONCE  9/10/19 01:15


 9/10/19 03:14 DC 9/10/19 01:30


250 MLS/HR











Labs:


Lab





Laboratory Tests








Test


 9/11/19


07:30 9/11/19


08:00 9/11/19


09:55


 


Phosphorus Level


 3.2 mg/dL


(2.6-4.7) 


 





 


Magnesium Level


 1.9 mg/dL


(1.8-2.4) 


 





 


Triglycerides Level


 136 mg/dL


(0-150) 


 





 


O2 Saturation  95 % (92-99)  


 


Arterial Blood pH


 


 7.37


(7.35-7.45) 





 


Arterial Blood pCO2 at


Patient Temp 


 48 mmHg


(35-46) 





 


Arterial Blood pO2 at Patient


Temp 


 80 mmHg


() 





 


Arterial Blood HCO3


 


 27 mmol/L


(21-28) 





 


Arterial Blood Base Excess


 


 1 mmol/L


(-3-3) 





 


FiO2  28  


 


Sodium Level


 


 


 146 mmol/L


(136-145)


 


Potassium Level


 


 


 3.7 mmol/L


(3.5-5.1)


 


Chloride Level


 


 


 110 mmol/L


()


 


Carbon Dioxide Level


 


 


 31 mmol/L


(21-32)


 


Anion Gap   5 (6-14) 


 


Blood Urea Nitrogen


 


 


 20 mg/dL


(7-20)


 


Creatinine


 


 


 0.8 mg/dL


(0.6-1.0)


 


Estimated GFR


(Cockcroft-Gault) 


 


 73.7 





 


BUN/Creatinine Ratio   25 (6-20) 


 


Glucose Level


 


 


 132 mg/dL


(70-99)


 


Calcium Level


 


 


 9.0 mg/dL


(8.5-10.1)


 


Total Bilirubin


 


 


 0.2 mg/dL


(0.2-1.0)


 


Aspartate Amino Transf


(AST/SGOT) 


 


 18 U/L (15-37) 





 


Alanine Aminotransferase


(ALT/SGPT) 


 


 25 U/L (14-59) 





 


Alkaline Phosphatase


 


 


 113 U/L


()


 


Total Protein


 


 


 6.4 g/dL


(6.4-8.2)


 


Albumin


 


 


 2.0 g/dL


(3.4-5.0)


 


Albumin/Globulin Ratio   0.5 (1.0-1.7) 











Objective:


Assessment:





 Sepsis improving


 Bacteremia POA 3/4  GRAM POSITIVE COCCI IN PAIRS, CHAINS, AND CLUSTERS,


 Fever, can be central line-associated bloodstream infection or abdominal


wall cellulitis.


 Lactic acidosis.


 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


peripherally inserted central line since 08/2018.


   History of acute pancreatitis with pseudocyst.


   Chronic obstructive pulmonary disease.


   Protein-calorie malnutrition.


  Mild splenomegaly.


   Anemia.


  Nursing home resident.


 .Morbid obesity.





Plan:


Plan of Care


cont vancomycin., meropenem and micafungin.


vanc trough pending from 9/12


monitor renal functions


    Status post one dose of Levaquin and aztreonam.


f/u bc results gpc still pending......


Continue to monitor labs and kidney functions closely.


 Wound care per wound team


Follow up cultures.


Continue supportive care.


 PICC line removed,f/u catheter tip for cultures.


f/u repeat bc neg so far


D/W SCHUYLER TALLEY MD           Sep 12, 2019 07:04

## 2019-09-12 NOTE — NUR
Pharmacy TPN Dosing Note



S: VAL NOLASCO is a 59 year old F Currently receiving Central Continuous TPN started 



B:Pertinent PMH: enterocutaneous fistula and ileocolonic fistula

Height: 5 feet, 4 inches

Weight: 131.846523 kg



Current diet: NPO 



LABS:

Sodium:    146 

Potassium: 4 

Chloride:  111 

Calcium:   8.3 

Corrected Calcium: 9.90 

Magnesium: 1.9 

CO2:       30 

SCr:       0.7 

Glucose:   131 

Albumin:   2.0 

AST:       18 

ALT:       25 



TPN FORMULA:

TPN TYPE:  Central Continuous

AMINO ACIDS:         100 gm

DEXTROSE:            150 gm

LIPIDS:              30 gm

SODIUM CHLORIDE:     210 mEq

SODIUM ACETATE:      - mEq

SODIUM PHOSPHATE:    15 mmol

POTASSIUM CHLORIDE:  40 mEq

POTASSIUM ACETATE:   - mEq

POTASSIUM PHOSPHATE: - mmol

MAGNESIUM:           16 mEq

CALCIUM:             10 mEq

INSULIN:             - units

MULTIPLE VITAMIN:    10 ml

TRACE ELEMENTS:      1 ml(s)



TPN PLAN:  

-Potassium up to 4 today

-Sodium and Chloride continue to trend up, however patient had active

order for 0.9% Sodium Chloride at 150 ml/hour. Discussed with Dr. Chacon and

at this time order Dc'd for NS.

-For these reasons will continue current TPN.

-BMP, Mag and Phos ordered for AM.





R: Continue current TPN with no adjustments at this time. 0.9% Sodium Chloride running at 
150 ml/hour outside of the TPN was discontinued. 

Will monitor electrolytes, glucose, and tolerance to TPN.



 JARON WOMACK, [facility], 09/12/19 [id number]

## 2019-09-12 NOTE — NUR
Wound/Ostomy care

Pt seen for Ostomy and fistula care of abdomen. Pt has a fistula on left upper quadrant and 
ileostomy on right upper abdomen, with the vast majority of effluent and stool coming from 
the fistula. Appliance has been leaking for part of the day, dressings and appliance 
removed, skin cleaned with water and washcloths. Applied skin prep, ostomy powder, 4" rings 
and wound drainage bag. Good seal obtained. WC/Ostomy RN will continue to follow for bag 
changes. No other wounds noted, coccyx is red but blanchable and pt has yeasty rash in 
folds. Pt repositioned onto right side with heels floated. KYRIE Gregg at bedside for 
dressing changes, and POC discussed with YKRIE Lee re: new appliance application, all 
questions answered.

## 2019-09-13 VITALS — DIASTOLIC BLOOD PRESSURE: 50 MMHG | SYSTOLIC BLOOD PRESSURE: 110 MMHG

## 2019-09-13 VITALS — DIASTOLIC BLOOD PRESSURE: 60 MMHG | SYSTOLIC BLOOD PRESSURE: 115 MMHG

## 2019-09-13 VITALS — SYSTOLIC BLOOD PRESSURE: 104 MMHG | DIASTOLIC BLOOD PRESSURE: 63 MMHG

## 2019-09-13 VITALS — SYSTOLIC BLOOD PRESSURE: 111 MMHG | DIASTOLIC BLOOD PRESSURE: 65 MMHG

## 2019-09-13 VITALS — DIASTOLIC BLOOD PRESSURE: 61 MMHG | SYSTOLIC BLOOD PRESSURE: 119 MMHG

## 2019-09-13 VITALS — DIASTOLIC BLOOD PRESSURE: 85 MMHG | SYSTOLIC BLOOD PRESSURE: 147 MMHG

## 2019-09-13 VITALS — SYSTOLIC BLOOD PRESSURE: 107 MMHG | DIASTOLIC BLOOD PRESSURE: 72 MMHG

## 2019-09-13 VITALS — SYSTOLIC BLOOD PRESSURE: 118 MMHG | DIASTOLIC BLOOD PRESSURE: 63 MMHG

## 2019-09-13 VITALS — DIASTOLIC BLOOD PRESSURE: 76 MMHG | SYSTOLIC BLOOD PRESSURE: 136 MMHG

## 2019-09-13 VITALS — DIASTOLIC BLOOD PRESSURE: 64 MMHG | SYSTOLIC BLOOD PRESSURE: 101 MMHG

## 2019-09-13 VITALS — DIASTOLIC BLOOD PRESSURE: 55 MMHG | SYSTOLIC BLOOD PRESSURE: 114 MMHG

## 2019-09-13 VITALS — SYSTOLIC BLOOD PRESSURE: 135 MMHG | DIASTOLIC BLOOD PRESSURE: 68 MMHG

## 2019-09-13 VITALS — SYSTOLIC BLOOD PRESSURE: 116 MMHG | DIASTOLIC BLOOD PRESSURE: 65 MMHG

## 2019-09-13 VITALS — SYSTOLIC BLOOD PRESSURE: 118 MMHG | DIASTOLIC BLOOD PRESSURE: 60 MMHG

## 2019-09-13 VITALS — DIASTOLIC BLOOD PRESSURE: 73 MMHG | SYSTOLIC BLOOD PRESSURE: 132 MMHG

## 2019-09-13 LAB
ANION GAP SERPL CALC-SCNC: 6 MMOL/L (ref 6–14)
BUN SERPL-MCNC: 19 MG/DL (ref 7–20)
CALCIUM SERPL-MCNC: 8.7 MG/DL (ref 8.5–10.1)
CHLORIDE SERPL-SCNC: 110 MMOL/L (ref 98–107)
CO2 SERPL-SCNC: 28 MMOL/L (ref 21–32)
CREAT SERPL-MCNC: 0.7 MG/DL (ref 0.6–1)
GFR SERPLBLD BASED ON 1.73 SQ M-ARVRAT: 85.9 ML/MIN
GLUCOSE SERPL-MCNC: 100 MG/DL (ref 70–99)
MAGNESIUM SERPL-MCNC: 1.9 MG/DL (ref 1.8–2.4)
PHOSPHATE SERPL-MCNC: 3.3 MG/DL (ref 2.6–4.7)
POTASSIUM SERPL-SCNC: 3.8 MMOL/L (ref 3.5–5.1)
SODIUM SERPL-SCNC: 144 MMOL/L (ref 136–145)

## 2019-09-13 PROCEDURE — 5A09357 ASSISTANCE WITH RESPIRATORY VENTILATION, LESS THAN 24 CONSECUTIVE HOURS, CONTINUOUS POSITIVE AIRWAY PRESSURE: ICD-10-PCS

## 2019-09-13 RX ADMIN — MEROPENEM SCH MLS/HR: 500 INJECTION, POWDER, FOR SOLUTION INTRAVENOUS at 00:25

## 2019-09-13 RX ADMIN — FENTANYL CITRATE PRN MCG: 50 INJECTION INTRAMUSCULAR; INTRAVENOUS at 18:06

## 2019-09-13 RX ADMIN — Medication SCH CAP: at 08:28

## 2019-09-13 RX ADMIN — VANCOMYCIN HYDROCHLORIDE SCH MLS/HR: 1 INJECTION, POWDER, FOR SOLUTION INTRAVENOUS at 23:15

## 2019-09-13 RX ADMIN — Medication SCH CAP: at 21:17

## 2019-09-13 RX ADMIN — VANCOMYCIN HYDROCHLORIDE PRN EACH: 1 INJECTION, POWDER, LYOPHILIZED, FOR SOLUTION INTRAVENOUS at 13:54

## 2019-09-13 RX ADMIN — FENTANYL CITRATE PRN MCG: 50 INJECTION INTRAMUSCULAR; INTRAVENOUS at 04:14

## 2019-09-13 RX ADMIN — FENTANYL CITRATE PRN MCG: 50 INJECTION INTRAMUSCULAR; INTRAVENOUS at 21:16

## 2019-09-13 RX ADMIN — MEROPENEM SCH MLS/HR: 500 INJECTION, POWDER, FOR SOLUTION INTRAVENOUS at 06:45

## 2019-09-13 RX ADMIN — Medication PRN EACH: at 13:14

## 2019-09-13 RX ADMIN — IPRATROPIUM BROMIDE AND ALBUTEROL SULFATE SCH ML: .5; 3 SOLUTION RESPIRATORY (INHALATION) at 16:08

## 2019-09-13 RX ADMIN — IPRATROPIUM BROMIDE AND ALBUTEROL SULFATE SCH ML: .5; 3 SOLUTION RESPIRATORY (INHALATION) at 20:23

## 2019-09-13 RX ADMIN — NYSTATIN SCH APP: 100000 POWDER TOPICAL at 23:32

## 2019-09-13 RX ADMIN — DEXTROSE SCH MLS/HR: 50 INJECTION, SOLUTION INTRAVENOUS at 08:27

## 2019-09-13 RX ADMIN — ENOXAPARIN SODIUM SCH MG: 40 INJECTION SUBCUTANEOUS at 08:29

## 2019-09-13 RX ADMIN — ONDANSETRON PRN MG: 2 INJECTION INTRAMUSCULAR; INTRAVENOUS at 05:34

## 2019-09-13 RX ADMIN — FENTANYL CITRATE PRN MCG: 50 INJECTION INTRAMUSCULAR; INTRAVENOUS at 13:57

## 2019-09-13 RX ADMIN — FENTANYL CITRATE PRN MCG: 50 INJECTION INTRAMUSCULAR; INTRAVENOUS at 08:10

## 2019-09-13 RX ADMIN — FENTANYL CITRATE PRN MCG: 50 INJECTION INTRAMUSCULAR; INTRAVENOUS at 10:53

## 2019-09-13 RX ADMIN — VANCOMYCIN HYDROCHLORIDE PRN EACH: 1 INJECTION, POWDER, LYOPHILIZED, FOR SOLUTION INTRAVENOUS at 14:01

## 2019-09-13 RX ADMIN — IPRATROPIUM BROMIDE AND ALBUTEROL SULFATE SCH ML: .5; 3 SOLUTION RESPIRATORY (INHALATION) at 11:09

## 2019-09-13 RX ADMIN — IPRATROPIUM BROMIDE AND ALBUTEROL SULFATE SCH ML: .5; 3 SOLUTION RESPIRATORY (INHALATION) at 08:00

## 2019-09-13 RX ADMIN — VANCOMYCIN HYDROCHLORIDE SCH MLS/HR: 1 INJECTION, POWDER, FOR SOLUTION INTRAVENOUS at 04:15

## 2019-09-13 RX ADMIN — ENOXAPARIN SODIUM SCH MG: 40 INJECTION SUBCUTANEOUS at 21:17

## 2019-09-13 RX ADMIN — NYSTATIN SCH APP: 100000 POWDER TOPICAL at 08:27

## 2019-09-13 RX ADMIN — MEROPENEM SCH MLS/HR: 500 INJECTION, POWDER, FOR SOLUTION INTRAVENOUS at 12:17

## 2019-09-13 RX ADMIN — MEROPENEM SCH MLS/HR: 500 INJECTION, POWDER, FOR SOLUTION INTRAVENOUS at 18:23

## 2019-09-13 NOTE — NUR
SW following pt for dc planning. Chart reviewed and pt is a transfer from ICU. Discharge 
disposition [facility]. D/w ESTEFANY Hough.

## 2019-09-13 NOTE — NUR
Pharmacy TPN Dosing Note



S: VAL NOLASCO is a 59 year old F Currently receiving Central Continuous TPN started 



B:Pertinent PMH: 

enterocutaneous fistula and ileocolonic fistula

Height: 5 feet, 4 inches

Weight: 132.978980 kg



Current diet: NPO 



LABS:

Sodium:    144 

Potassium: 3.8 

Chloride:  110 

Calcium:   8.7 

Corrected Calcium: 10.30 

Magnesium: 1.9 

CO2:       28 

SCr:       0.7 

Glucose:   100 

Albumin:   2.0 

AST:       18 

ALT:       25 



TPN FORMULA:

TPN TYPE:  Central Continuous

AMINO ACIDS:         100 gm

DEXTROSE:            150 gm

LIPIDS:              30 gm

SODIUM CHLORIDE:     210 mEq

SODIUM ACETATE:      - mEq

SODIUM PHOSPHATE:    15 mmol

POTASSIUM CHLORIDE:  40 mEq

POTASSIUM ACETATE:   - mEq

POTASSIUM PHOSPHATE: - mmol

MAGNESIUM:           16 mEq

CALCIUM:             10 mEq

INSULIN:             - units

MULTIPLE VITAMIN:    10 ml

TRACE ELEMENTS:      1 ml(s)



TPN PLAN:  

Na and chloride level trending down, corrected Ca @ 10.3 is slightly

elevated, will continue TPN with current formula





R: Continue TPN as noted above

Will monitor electrolytes, glucose, and tolerance to TPN.



 JOSÉ MIGUEL CRUZ [facility], 09/13/19 [id number]

## 2019-09-13 NOTE — NUR
pt arrived to unit at 1130 in stable condition. pt is alert and oriented. pt is on 3LNC. 
ostomy and fistula leaking on arrival, pt changed and leaking fixed. assessment and vital 
signs done. pt is not having any pain at this time. received report from KYRIE Combs in ICU. 
will continue to monitor.

## 2019-09-13 NOTE — PN
DATE:  09/12/2019



SUBJECTIVE:  The patient is resting, slightly propped up in bed, sleeping

comfortably on BiPAP, maintaining her oxygen saturation at 98% on FiO2 of 28%. 

Nursing staff did not voice any concerns.  She had an uneventful night.



OBJECTIVE:

GENERAL:  When I examined her, she was pale.  No jaundice, cyanosis, or

thyromegaly.  No jugular venous distention.  No limb edema.

VITAL SIGNS:  Her heart rate was 109, blood pressure was 107/56, temperature was

98, respiratory rate was 17 and oxygen saturation was 98%.

HEAD, EYES, EARS, NOSE AND THROAT:  Normocephalic, atraumatic.

NECK:  Supple.

HEART:  Showed normal first and second heart sounds.  No gallop or murmur.

CHEST:  Clear to auscultation.  No crepitation or rhonchi.

ABDOMEN:  Distended, soft with an enterocutaneous fistula in the right lower

quadrant with surrounding cellulitis.

NEUROLOGIC:  She was sleepy, but arousable.  All cranial nerves intact.  She

moves extremities without difficulty.



Her intake was 2070, output was 3270.



LABORATORY DATA:  As of yesterday, her white cell count was 8000, hemoglobin

8.6, hematocrit 27.6, MCV 83 and platelet count 235,000.  Her chemistry showed a

serum sodium 146, potassium 3.7, chloride 110, bicarbonate 31, anion gap of 5,

BUN 20, creatinine 0.8, estimated GFR was 74 mL per minute.  Her glucose 132,

calcium was 9.  Total bilirubin, AST, ALT, alkaline phosphatase were normal. 

Total protein was 6.4, albumin 2.  Her triglycerides were 136.  Her blood

cultures showed no growth after one day; however, blood cultures showed growth

of gram-positive cocci in pairs, chains, and clusters in 2 out of 2 bottles.



ASSESSMENT:  

1.  In summary, this is a 59-year-old with severe sepsis with and multiple

potential sources including:  a)  Abdominal wall cellulitis.  b)  Line-related

sepsis for which her PICC line was removed.  c)  She is known to have bilateral

renal stones.  d)  Possible healthcare-associated pneumonia.  She continues to

be on IV vancomycin, Zosyn, micafungin as she is ALLERGIC TO PENICILLIN.  Her

Levophed was discontinued.

2.  Enterocutaneous fistula for which she is on TPN.  She has a multitude of

other medical problems including:  a)  Morbid obesity.  b)  Obstructive sleep

apnea.  c)  Hypertension.  d)  Chronic obstructive pulmonary disease.  e) 

Chronic hypoxic hypercapnic respiratory failure.  f)  Generalized

osteoarthritis.  g)  Anemia of chronic disease.



PLAN:  To continue with IV antibiotic.  Continue with BiPAP machine for her

hypoxic respiratory failure.  Continue with TPN for nutritional support. 

Continue with wound care.

 



______________________________

RAISA HURLEY MD



DR:  ALIYA/[email or web address]  JOB#:  [id number] / [id number]

DD:  09/12/2019 07:48  DT:  09/12/2019 12:07

## 2019-09-13 NOTE — NUR
SS following up with discharge planning. Pt is a LTC resident from [facility], 
[phone]; fax [phone], and is able to return when medically stable for discharge. 
SS phoned and faxed clinical updates to [facility]. SS will continue to follow for 
discharge planning.

## 2019-09-13 NOTE — PDOC
Infectious Disease Note


Subjective:


Subjective


pt is alert ,awake on c pap for nicholas


usually during the day is on O2


feeling better


d/w rn





ROS:


ROS


Negative otherwise.





Vital Signs:


Vital Signs





Vital Signs








  Date Time  Temp Pulse Resp B/P (MAP) Pulse Ox O2 Delivery O2 Flow Rate FiO2


 


9/13/19 06:00  95 22 101/64 (76) 97 BiPAP/CPAP  


 


9/13/19 04:00 98.7       





 98.7       


 


9/12/19 20:00       3.0 











Physical Exam:


PHYSICAL EXAM


GENERAL:  Patient is lying in bed, on BiPAP, alert, appears tired.


HEENT:  No icterus, on BiPAP.


NECK:  Supple.


LUNGS:  Clear anteriorly.


HEART:  S1, S2, tachycardia.  No murmurs.


ABDOMEN:  Obese, soft, bowel sounds present.  Two stomas with surrounding


excoriation, redness, tenderness.


EXTREMITIES:  No edema or cyanosis.


DERMATOLOGIC:  Warm and dry.  No generalized rash.


NEUROLOGIC:  Alert and oriented x 3.


LINES:  Right upper extremity PICC line without signs of complication.





Medications:


Inpatient Meds:





Current Medications








 Medications


  (Trade)  Dose


 Ordered  Sig/Laura  Start Time


 Stop Time Status Last Admin


Dose Admin


 


 Acetaminophen


  (Tylenol Supp)  650 mg  1X  ONCE  9/9/19 23:45


 9/9/19 23:46 DC 9/10/19 00:28


650 MG


 


 Acetaminophen


  (Tylenol)  650 mg  PRN Q4HRS  PRN  9/10/19 19:45


    9/12/19 08:01


650 MG


 


 Albuterol/


 Ipratropium


  (Duoneb)  3 ml  RTQID  9/11/19 16:00


    9/12/19 19:57


3 ML


 


 Aztreonam


  (Azactam)  2 gm  1X  ONCE  9/9/19 22:30


 9/9/19 22:31 DC 9/9/19 22:51


2 GM


 


 Dexamethasone


 Sodium Phosphate


  (Decadron)  10 mg  1X  ONCE  9/9/19 22:30


 9/9/19 22:31 DC 9/9/19 23:58


10 MG


 


 Enoxaparin Sodium


  (Lovenox 40mg


 Syringe)  40 mg  Q12HR  9/11/19 21:00


    9/12/19 20:08


40 MG


 


 Fentanyl Citrate


  (Fentanyl 2ml


 Vial)  50 mcg  PRN Q3HRS  PRN  9/12/19 20:15


    9/13/19 04:15


50 MCG


 


 Info


  (CONTRAST GIVEN


 -- Rx MONITORING)  1 each  PRN DAILY  PRN  9/9/19 23:30


 9/11/19 23:29 DC  





 


 Info


  (Tpn Per


 Pharmacy)  1 each  PRN DAILY  PRN  9/10/19 15:15


    9/12/19 11:12


1 EACH


 


 Iohexol


  (Omnipaque 350


 Mg/ml)  100 ml  1X  ONCE  9/9/19 23:30


 9/9/19 23:31 DC 9/9/19 23:37


100 ML


 


 Lactobacillus


 Rhamnosus


  (Culturelle)  1 cap  BID  9/11/19 21:00


    9/12/19 20:08


1 CAP


 


 Levofloxacin/


 Dextrose  150 ml @ 


 100 mls/hr  1X  ONCE  9/10/19 01:00


 9/10/19 02:29 DC 9/10/19 03:44


100 MLS/HR


 


 Magnesium Sulfate  50 ml @ 25


 mls/hr  1X  ONCE  9/10/19 01:00


 9/10/19 02:59 DC 9/10/19 03:44


25 MLS/HR


 


 Meropenem 500 mg/


 Sodium Chloride  50 ml @ 


 100 mls/hr  Q6HRS  9/10/19 08:30


    9/13/19 06:45


100 MLS/HR


 


 Micafungin Sodium


 100 mg/Dextrose  100 ml @ 


 100 mls/hr  Q24H  9/10/19 09:00


    9/12/19 08:01


100 MLS/HR


 


 Norepinephrine


 Bitartrate  250 ml @ 


 24.032 mls/


 hr  CONT  PRN  9/10/19 05:15


    9/10/19 18:10


24.032 MLS/HR


 


 Nystatin


  (Nystop)  1 cathie  BID  9/10/19 21:00


    9/12/19 22:07


1 CATHIE


 


 Ondansetron HCl


  (Zofran)  4 mg  PRN Q4HRS  PRN  9/11/19 07:45


    9/13/19 05:34


4 MG


 


 Sodium Bicarbonate


  (Sodium Bicarb


 Adult 8.4% Syr)  50 meq  1X  ONCE  9/10/19 14:00


 9/10/19 14:01 DC 9/10/19 14:02


50 MEQ


 


 Sodium Chloride


 210 meq/Sodium


 Phosphate 15 mmol/


 Potassium


 Chloride 40 meq/


 Magnesium Sulfate


 16 meq/Calcium


 Gluconate 10 meq/


 Multivitamins 10


 ml/Chromium/


 Copper/Manganese/


 Seleni/Zn 1 ml/


 Total Parenteral


 Nutrition/Amino


 Acids/Dextrose/


 Fat Emulsion


 Intravenous  1,500 ml @ 


 62.5 mls/hr  TPN  CONT  9/12/19 22:00


 9/13/19 21:59  9/13/19 00:26


62.5 MLS/HR


 


 Vancomycin HCl


  (Vanco Per


 Pharmacy)  1 each  PRN DAILY  PRN  9/10/19 08:30


    9/12/19 10:46


1 EACH


 


 Vancomycin HCl


  (Vancomycin


 Trough Level)  1 each  1X  ONCE  9/14/19 16:30


 9/14/19 16:31   





 


 Vancomycin HCl


 1.5 gm/Sodium


 Chloride  500 ml @ 


 250 mls/hr  Q12H  9/12/19 11:00


   Cancel  





 


 Vancomycin HCl


 1.75 gm/Sodium


 Chloride  500 ml @ 


 250 mls/hr  Q18H  9/12/19 11:00


    9/13/19 04:15


250 MLS/HR


 


 Vancomycin HCl 2


 gm/Sodium Chloride  500 ml @ 


 250 mls/hr  1X  ONCE  9/10/19 01:15


 9/10/19 03:14 DC 9/10/19 01:30


250 MLS/HR











Labs:


Lab





Laboratory Tests








Test


 9/12/19


08:55 9/12/19


11:50


 


Sodium Level


 146 mmol/L


(136-145) 





 


Potassium Level


 4.0 mmol/L


(3.5-5.1) 





 


Chloride Level


 111 mmol/L


() 





 


Carbon Dioxide Level


 30 mmol/L


(21-32) 





 


Anion Gap 5 (6-14)  


 


Blood Urea Nitrogen


 18 mg/dL


(7-20) 





 


Creatinine


 0.7 mg/dL


(0.6-1.0) 





 


Estimated GFR


(Cockcroft-Gault) 85.9 


 





 


Glucose Level


 131 mg/dL


(70-99) 





 


Calcium Level


 8.3 mg/dL


(8.5-10.1) 





 


Phosphorus Level


 3.3 mg/dL


(2.6-4.7) 





 


Magnesium Level


 1.9 mg/dL


(1.8-2.4) 





 


Vancomycin Level Trough


 10.2 mcg/mL


(10.0-20.0) 





 


Vancomycin Last Dose Date 09/11/19  


 


Vancomycin Last Dose Time 1400  


 


O2 Saturation  96 % (92-99) 


 


Arterial Blood pH


 


 7.35


(7.35-7.45)


 


Arterial Blood pCO2 at


Patient Temp 


 40 mmHg


(35-46)


 


Arterial Blood pO2 at Patient


Temp 


 91 mmHg


()


 


Arterial Blood HCO3


 


 22 mmol/L


(21-28)


 


Arterial Blood Base Excess


 


 -4 mmol/L


(-3-3)


 


FiO2  28 











Objective:


Assessment:


 Sepsis improving


 Bacteremia POA 3/4  GRAM POSITIVE COCCI IN PAIRS, CHAINS, AND CLUSTERS, 


 Fever, can be central line-associated bloodstream infection or abdominal


wall cellulitis.


 Lactic acidosis.


 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


peripherally inserted central line since 08/2018.


   History of acute pancreatitis with pseudocyst.


   Chronic obstructive pulmonary disease.


   Protein-calorie malnutrition.


  Mild splenomegaly.


   Anemia.


  Nursing home resident.


 .Morbid obesity.





Plan:


Plan of Care


cont vancomycin., meropenem  ,micafungin for now


ID pending for staph in BC


vanc trough 9/12


monitor renal functions


    Status post one dose of Levaquin and aztreonam.


f/u bc results staph still pending......


Continue to monitor labs 


 Wound care per wound team


Follow up cultures.


 PICC line removed,catheter tip neg so far


f/u repeat bc neg so far


D/W SCHUYLER TALLEY MD           Sep 13, 2019 07:29

## 2019-09-14 VITALS — SYSTOLIC BLOOD PRESSURE: 125 MMHG | DIASTOLIC BLOOD PRESSURE: 59 MMHG

## 2019-09-14 VITALS — DIASTOLIC BLOOD PRESSURE: 73 MMHG | SYSTOLIC BLOOD PRESSURE: 118 MMHG

## 2019-09-14 VITALS — SYSTOLIC BLOOD PRESSURE: 137 MMHG | DIASTOLIC BLOOD PRESSURE: 69 MMHG

## 2019-09-14 VITALS — SYSTOLIC BLOOD PRESSURE: 130 MMHG | DIASTOLIC BLOOD PRESSURE: 80 MMHG

## 2019-09-14 VITALS — DIASTOLIC BLOOD PRESSURE: 77 MMHG | SYSTOLIC BLOOD PRESSURE: 149 MMHG

## 2019-09-14 VITALS — DIASTOLIC BLOOD PRESSURE: 88 MMHG | SYSTOLIC BLOOD PRESSURE: 164 MMHG

## 2019-09-14 LAB — VANC TR: 14.6 MCG/ML (ref 10–20)

## 2019-09-14 PROCEDURE — 5A09357 ASSISTANCE WITH RESPIRATORY VENTILATION, LESS THAN 24 CONSECUTIVE HOURS, CONTINUOUS POSITIVE AIRWAY PRESSURE: ICD-10-PCS

## 2019-09-14 RX ADMIN — Medication SCH MG: at 22:25

## 2019-09-14 RX ADMIN — IPRATROPIUM BROMIDE AND ALBUTEROL SULFATE SCH ML: .5; 3 SOLUTION RESPIRATORY (INHALATION) at 19:21

## 2019-09-14 RX ADMIN — NYSTATIN SCH APP: 100000 POWDER TOPICAL at 22:25

## 2019-09-14 RX ADMIN — IPRATROPIUM BROMIDE AND ALBUTEROL SULFATE SCH ML: .5; 3 SOLUTION RESPIRATORY (INHALATION) at 15:04

## 2019-09-14 RX ADMIN — VANCOMYCIN HYDROCHLORIDE PRN EACH: 1 INJECTION, POWDER, LYOPHILIZED, FOR SOLUTION INTRAVENOUS at 17:28

## 2019-09-14 RX ADMIN — DIPHENOXYLATE HYDROCHLORIDE AND ATROPINE SULFATE SCH TAB: 2.5; .025 TABLET ORAL at 11:52

## 2019-09-14 RX ADMIN — ENOXAPARIN SODIUM SCH MG: 150 INJECTION SUBCUTANEOUS at 22:25

## 2019-09-14 RX ADMIN — CITALOPRAM HYDROBROMIDE SCH MG: 20 TABLET ORAL at 10:04

## 2019-09-14 RX ADMIN — DIPHENOXYLATE HYDROCHLORIDE AND ATROPINE SULFATE SCH TAB: 2.5; .025 TABLET ORAL at 22:24

## 2019-09-14 RX ADMIN — FENTANYL CITRATE PRN MCG: 50 INJECTION INTRAMUSCULAR; INTRAVENOUS at 00:09

## 2019-09-14 RX ADMIN — VANCOMYCIN HYDROCHLORIDE SCH MLS/HR: 1 INJECTION, POWDER, FOR SOLUTION INTRAVENOUS at 18:00

## 2019-09-14 RX ADMIN — FENTANYL CITRATE PRN MCG: 50 INJECTION INTRAMUSCULAR; INTRAVENOUS at 06:09

## 2019-09-14 RX ADMIN — MEROPENEM SCH MLS/HR: 500 INJECTION, POWDER, FOR SOLUTION INTRAVENOUS at 11:52

## 2019-09-14 RX ADMIN — Medication SCH MG: at 10:04

## 2019-09-14 RX ADMIN — DEXTROSE SCH MLS/HR: 50 INJECTION, SOLUTION INTRAVENOUS at 09:23

## 2019-09-14 RX ADMIN — IPRATROPIUM BROMIDE AND ALBUTEROL SULFATE SCH ML: .5; 3 SOLUTION RESPIRATORY (INHALATION) at 07:03

## 2019-09-14 RX ADMIN — MEROPENEM SCH MLS/HR: 500 INJECTION, POWDER, FOR SOLUTION INTRAVENOUS at 23:57

## 2019-09-14 RX ADMIN — FAMOTIDINE SCH MG: 10 INJECTION, SOLUTION INTRAVENOUS at 09:23

## 2019-09-14 RX ADMIN — Medication SCH CAP: at 22:24

## 2019-09-14 RX ADMIN — FENTANYL CITRATE PRN MCG: 50 INJECTION INTRAMUSCULAR; INTRAVENOUS at 03:06

## 2019-09-14 RX ADMIN — ENOXAPARIN SODIUM SCH MG: 40 INJECTION SUBCUTANEOUS at 09:26

## 2019-09-14 RX ADMIN — MEROPENEM SCH MLS/HR: 500 INJECTION, POWDER, FOR SOLUTION INTRAVENOUS at 06:05

## 2019-09-14 RX ADMIN — FAMOTIDINE SCH MG: 10 INJECTION, SOLUTION INTRAVENOUS at 22:24

## 2019-09-14 RX ADMIN — IPRATROPIUM BROMIDE AND ALBUTEROL SULFATE SCH ML: .5; 3 SOLUTION RESPIRATORY (INHALATION) at 11:22

## 2019-09-14 RX ADMIN — DIPHENOXYLATE HYDROCHLORIDE AND ATROPINE SULFATE SCH TAB: 2.5; .025 TABLET ORAL at 16:26

## 2019-09-14 RX ADMIN — Medication SCH CAP: at 09:25

## 2019-09-14 RX ADMIN — MEROPENEM SCH MLS/HR: 500 INJECTION, POWDER, FOR SOLUTION INTRAVENOUS at 17:19

## 2019-09-14 RX ADMIN — MEROPENEM SCH MLS/HR: 500 INJECTION, POWDER, FOR SOLUTION INTRAVENOUS at 00:08

## 2019-09-14 RX ADMIN — NYSTATIN SCH APP: 100000 POWDER TOPICAL at 09:26

## 2019-09-14 RX ADMIN — PANTOPRAZOLE SODIUM SCH MG: 40 TABLET, DELAYED RELEASE ORAL at 10:05

## 2019-09-14 NOTE — NUR
Pharmacy Vancomycin Dosing Note



S: Consulted to monitor and dose vancomycin started 09/10/19.



O: VAL NOLASCO is a 59 year old F with abd wall cellulitis, bacteremia.



Other Antibiotics: 

Merrem 500mg q6h

Mycamine 100mg q24h



LABS:

Last BUN: 19 

Last Creatinine: 0.7 

Creatinine Clearance: > 100 mL/min

Last WBC: 8.1 

Last Procalcitonin: - 

Tmax (past 24 hours): 99.8 



Microbiology:  

BLOOD CX (9/9): ENTEROCOCCUS, STAPH



I/O: 2610/1225 



Drug Levels:

Last Trough level: 14.6 on 09/14/19 at 1630 

Last dose given 09/13/19 at 2320 



Vancomycin Dosing:

Dosing Weight: Actual

Target Trough: 15-20





A: Patient is receiving vancomycin 1750 mg IV q18hrs. A trough of 14.6 mcg/ml is very close 
to goal. Renal function remains stable. 





P: 1. Continue Vancomycin 1750 mg IV q18h

    2. Follow up trough level in 5 - 7 days

    3. Pharmacy will continue to monitor, follow and adjust therapy as needed.

 

 OSCAR MOREL [facility], 09/14/19 [id number]

## 2019-09-14 NOTE — PN
DATE:  09/13/2019



SUBJECTIVE:  The patient is resting, slightly propped up in bed, in no apparent

respiratory distress.  She is eating breakfast comfortably, denied any

complaint.  Nursing staff did not voice any concerns that she had an uneventful

night.



PHYSICAL EXAMINATION:

GENERAL:  She looked pale.  No jaundice, cyanosis, or thyromegaly.  No jugular

venous distension.  No lower limb edema.

VITAL SIGNS:  Her heart rate was 95, blood pressure was 101/64, temperature was

98.7, respiratory rate was 22 and oxygen saturation was 98% on 3 liters of

oxygen.

HEAD, EYES, EARS, NOSE AND THROAT:  Showed normocephalic, atraumatic.

NECK:  Supple.

HEART:  Showed normal first and second heart sounds with no gallop or murmur.

CHEST:  Clear to auscultation.  No crepitation or rhonchi.

ABDOMEN:  Distended, soft, nontender.  No guarding or rigidity.  No

organomegaly.  All hernial orifices intact.  Bowel sounds normal.

NEUROLOGIC:  She is awake, alert, responding appropriately.  All cranial nerves

intact.  She moves extremities without difficulty.



Her intake over the last 24 hours was 7450, output was 2800.



LABORATORY DATA:  Her most recent white cell count was 8100, hemoglobin 8.6,

hematocrit 27.6, MCV 83 and platelet count 235,000.  Serum sodium was 144,

potassium 3.8, chloride 110, bicarbonate 28, anion gap of 6, BUN 19, creatinine

0.7, estimated GFR was 86 mL per minute.  Her glucose 100, calcium was 8.7,

phosphorus 3.3 and magnesium was 1.9.  Total bilirubin, AST, ALT, alkaline

phosphatase were normal.  Total protein was 6.4, albumin 2.



Sepsis with blood cultures growing Staphylococcus species.  The identification

and sensitivity is still pending at the time of this dictation.



ASSESSMENT:

1.   Sepsis, improving.  She has bacteremia, gram-positive cocci in pairs,

chains and clusters.  Source of infection could be line related infection or

abdominal wall cellulitis.

2.  Lactic acidosis, resolved.

3.  Enterocutaneous fistula.

4.  Chronic total parenteral nutrition.

5.  History of chronic pancreatitis and pseudocyst.

6.  Chronic obstructive pulmonary disease.

7.  Morbid obesity.

8.  Obstructive sleep apnea.

9.  Chronic hypoxic respiratory failure.



PLAN:  To continue with vancomycin, meropenem and micafungin.  Continue

nutritional support.  Continue with wound care.  The patient can be transferred

to the floor.

 



______________________________

RAISA HURLEY MD



DR:  ALIYA/chapincito  JOB#:  [id number] / [id number]

DD:  09/13/2019 08:57  DT:  09/13/2019 10:24

## 2019-09-14 NOTE — PN
DATE:  09/14/2019



SUBJECTIVE:  The patient is resting, slightly propped up in bed, sleeping

comfortably on BiPAP machine, maintaining her oxygen saturation at 96% on FiO2

of 30%.  On questioning her, denied any complaint.  The nursing staff did not

voice any concerns that she has an uneventful night.



PHYSICAL EXAMINATION:

GENERAL:  When I examined her, she looked pale, but no jaundice, cyanosis or

thyromegaly.  No jugular venous distention.  No limb edema.

VITAL SIGNS:  Her heart rate was 81, blood pressure was 164/88, temperature was

98.3, respiratory rate was 25, and oxygen saturation was 96%.

HEAD, EYES, EARS, NOSE AND THROAT:  Showed normocephalic, atraumatic.

NECK:  Supple.

HEART:  Showed normal first and second heart sounds.  No gallop or murmur.

CHEST:  Clear to auscultation.  No crepitation or rhonchi.

ABDOMEN:  Distended, soft with an enterocutaneous fistula in the right lower

quadrant with surrounding abdominal wall cellulitis that is improving.

NEUROLOGIC:  She was sleepy, but arousable.  All cranial nerves intact.  She

moves extremities without difficulty.



Her intake over the last 24 hours was 4725, output was 3290.



LABORATORY DATA:  As of yesterday, her serum sodium was 144, potassium 3.8,

chloride 110, bicarbonate 28, anion gap of 6, BUN 19, creatinine 0.7, estimated

GFR was 86 mL per minute.  Her glucose 100, calcium was 8.7, phosphorus 3.3,

magnesium was 1.9.  Her most recent white cell count was 8100, hemoglobin was

8.6, hematocrit 27.6, MCV 83 and platelet count 235,000.



ASSESSMENT:

1.  Sepsis, improving.  She has bacteremia with growth of gram-positive cocci in

pairs, chains and clusters.  The source of infection could be related to line

related or abdominal wall cellulitis.

2.  Lactic acidosis, resolved.

3.  Enterocutaneous fistula, on TPN.

4.  Chronic total parenteral malnutrition.

5.  History of chronic pancreatitis and pseudocyst.

6.  Chronic obstructive pulmonary disease.

7.  Morbid obesity, obstructive sleep apnea.

8.  Chronic hypoxic hypercapnic respiratory failure, requiring BiPAP machine.



PLAN:  To continue with IV antibiotic in the form of vancomycin, meropenem and

micafungin.  Continue nutritional support.  Continue with wound care.  Continue

with BiPAP machine as needed.

 



______________________________

RAISA HURLEY MD



DR:  ALIYA/chapincito  JOB#:  [id number] / [id number]

DD:  09/14/2019 08:55  DT:  09/14/2019 09:02

## 2019-09-14 NOTE — PDOC
Infectious Disease Note


Subjective:


Subjective


pt tx to medical floor last pm


feeling better


no fever, chills,nausea, vomiting,diarrhea, sob,cough, abdo pain


d/w rn





ROS:


ROS


Negative otherwise.





Vital Signs:


Vital Signs





Vital Signs








  Date Time  Temp Pulse Resp B/P (MAP) Pulse Ox O2 Delivery O2 Flow Rate FiO2


 


19 10:07   20  92 BiPAP/CPAP  


 


19 07:00 98.3 81  164/88 (113)    





 98.3       


 


19 03:00       3.0 











Physical Exam:


PHYSICAL EXAM


GENERAL:  Patient is lying in bed, on BiPAP, alert, appears tired.


HEENT:  No icterus, on BiPAP.


NECK:  Supple.


LUNGS:  Clear anteriorly.


HEART:  S1, S2, tachycardia.  No murmurs.


ABDOMEN:  Obese, soft, bowel sounds present.  Two stomas with surrounding


excoriation, redness, tenderness.


EXTREMITIES:  No edema or cyanosis.


DERMATOLOGIC:  Warm and dry.  No generalized rash.


NEUROLOGIC:  Alert and oriented x 3.


LINES:  Right upper extremity PICC line without signs of complication.





Medications:


Inpatient Meds:





Current Medications








 Medications


  (Trade)  Dose


 Ordered  Sig/Laura  Start Time


 Stop Time Status Last Admin


Dose Admin


 


 Acetaminophen


  (Tylenol Supp)  650 mg  1X  ONCE  19 23:45


 19 23:46 DC 9/10/19 00:28


650 MG


 


 Acetaminophen


  (Tylenol)  650 mg  PRN Q4HRS  PRN  9/10/19 19:45


    19 08:01


650 MG


 


 Albuterol Sulfate


  (Ventolin Neb


 Soln)  2.5 mg  PRN Q4HRS  PRN  19 09:15


     





 


 Albuterol/


 Ipratropium


  (Duoneb)  3 ml  RTQID  19 16:00


    19 07:03


3 ML


 


 Aztreonam


  (Azactam)  2 gm  1X  ONCE  19 22:30


 19 22:31 DC 19 22:51


2 GM


 


 Calcium Carbonate/


 Glycine


  (Tums)  500 mg  PRN BID  PRN  19 09:45


     





 


 Citalopram


 Hydrobromide


  (CeleXA)  40 mg  DAILY  19 10:30


    19 10:07


40 MG


 


 Cyanocobalamin


  (Vitamin B-12)  1,000 mcg  QMONTH  10/14/19 09:00


   UNV  





 


 Dexamethasone


 Sodium Phosphate


  (Decadron)  10 mg  1X  ONCE  19 22:30


 19 22:31 DC 19 23:58


10 MG


 


 Diphenoxylate HCl/


 Atropine


  (Lomotil)  1 tab  QID  19 13:00


     





 


 Enoxaparin Sodium


  (Lovenox 100mg


 Syringe)  90 mg  1X  ONCE  19 10:30


 19 10:31  19 10:07


90 MG


 


 Enoxaparin Sodium


  (Lovenox 150mg


 Syringe)  130 mg  BID  19 21:00


     





 


 Enoxaparin Sodium


  (Lovenox 40mg


 Syringe)  40 mg  Q12HR  19 21:00


 19 09:42 DC 19 09:26


40 MG


 


 Ergocalciferol


  (Vitamin D2)  50,000 unit  WEEKLY  19 09:00


     





 


 Famotidine


  (Pepcid Vial)  20 mg  BID  19 09:00


    19 09:26


20 MG


 


 Fentanyl Citrate


  (Fentanyl 2ml


 Vial)  50 mcg  PRN Q3HRS  PRN  19 20:15


    19 06:09


50 MCG


 


 Ferrous Sulfate


  (Feosol)  325 mg  BID  19 10:30


    19 10:07


325 MG


 


 Info


  (CONTRAST GIVEN


 -- Rx MONITORING)  1 each  PRN DAILY  PRN  19 23:30


 19 23:29 DC  





 


 Info


  (Tpn Per


 Pharmacy)  1 each  PRN DAILY  PRN  9/10/19 15:15


    19 13:14


1 EACH


 


 Iohexol


  (Omnipaque 350


 Mg/ml)  100 ml  1X  ONCE  19 23:30


 19 23:31 DC 19 23:37


100 ML


 


 Lactobacillus


 Rhamnosus


  (Culturelle)  1 cap  BID  19 21:00


    19 09:26


1 CAP


 


 Levofloxacin/


 Dextrose  150 ml @ 


 100 mls/hr  1X  ONCE  9/10/19 01:00


 9/10/19 02:29 DC 9/10/19 03:44


100 MLS/HR


 


 Loperamide HCl


  (Immodium Oral


 Susp)  2 mg  PRN Q2HR  PRN  19 09:15


     





 


 Magnesium Sulfate  50 ml @ 25


 mls/hr  1X  ONCE  9/10/19 01:00


 9/10/19 02:59 DC 9/10/19 03:44


25 MLS/HR


 


 Meropenem 500 mg/


 Sodium Chloride  50 ml @ 


 100 mls/hr  Q6HRS  9/10/19 08:30


    19 06:09


100 MLS/HR


 


 Micafungin Sodium


 100 mg/Dextrose  100 ml @ 


 100 mls/hr  Q24H  9/10/19 09:00


    19 09:26


100 MLS/HR


 


 Morphine Sulfate


  (Morphine Ir)  15 mg  PRN QID  PRN  19 09:15


     





 


 Non-Formulary


 Medication


  (Lactobacillus


 Acidophilus


  (Acidophilus))  1 each  BID  19 21:00


   UNV  





 


 Non-Formulary


 Medication


  (Melatonin )  3 mg  QHS  PRN  19 09:15


   UNV  





 


 Non-Formulary


 Medication


  (Miconazole


 Nitrate (Remedy


 Phytoplex


 Antifungal))  71 gm  PRN BID  PRN  19 09:15


   UNV  





 


 Non-Formulary


 Medication


  (Miconazole


 Nitrate )  45 gm  PRN Q24HRS  PRN  19 09:15


   UNV  





 


 Non-Formulary


 Medication


  (Multivitamin


  (Multivitamins))  1 tab  DAILY  9/15/19 09:00


   UNV  





 


 Non-Formulary


 Medication


  (Ondansetron Hcl


  (Zofran))  4 mg  PRN Q4HRS  PRN  19 09:15


 19 09:43 DC  





 


 Norepinephrine


 Bitartrate  250 ml @ 


 24.032 mls/


 hr  CONT  PRN  9/10/19 05:15


 19 08:41 DC 9/10/19 18:10


24.032 MLS/HR


 


 Nystatin


  (Nystop)  1 cathie  BID  9/10/19 21:00


    19 09:26


1 CATHIE


 


 Ondansetron HCl


  (Zofran)  4 mg  PRN Q4HRS  PRN  19 07:45


    19 05:34


4 MG


 


 Oxycodone HCl


  (Roxicodone)  10 mg  PRN Q4HRS  PRN  19 09:45


    19 10:07


10 MG


 


 Pantoprazole


 Sodium


  (Protonix)  40 mg  DAILYAC  19 10:30


    19 10:07


40 MG


 


 Polyethylene


 Glycol


  (miraLAX PACKET)  17 gm  PRN DAILY  PRN  19 09:15


     





 


 Sodium Bicarbonate


  (Sodium Bicarb


 Adult 8.4% Syr)  50 meq  1X  ONCE  9/10/19 14:00


 9/10/19 14:01 DC 9/10/19 14:02


50 MEQ


 


 Sodium Chloride


 210 meq/Sodium


 Phosphate 15 mmol/


 Potassium


 Chloride 40 meq/


 Magnesium Sulfate


 16 meq/Calcium


 Gluconate 10 meq/


 Multivitamins 10


 ml/Chromium/


 Copper/Manganese/


 Seleni/Zn 1 ml/


 Total Parenteral


 Nutrition/Amino


 Acids/Dextrose/


 Fat Emulsion


 Intravenous  1,500 ml @ 


 62.5 mls/hr  TPN  CONT  19 22:00


 19 21:59  19 23:20


62.5 MLS/HR


 


 Vancomycin HCl


  (Vanco Per


 Pharmacy)  1 each  PRN DAILY  PRN  9/10/19 08:30


    19 14:01


1 EACH


 


 Vancomycin HCl


  (Vancomycin


 Trough Level)  1 each  1X  ONCE  19 16:30


 19 16:31   





 


 Vancomycin HCl


 1.5 gm/Sodium


 Chloride  500 ml @ 


 250 mls/hr  Q12H  19 11:00


   Cancel  





 


 Vancomycin HCl


 1.75 gm/Sodium


 Chloride  500 ml @ 


 250 mls/hr  Q18H  19 11:00


    19 23:20


250 MLS/HR


 


 Vancomycin HCl 2


 gm/Sodium Chloride  500 ml @ 


 250 mls/hr  1X  ONCE  9/10/19 01:15


 9/10/19 03:14 DC 9/10/19 01:30


250 MLS/HR


 


 Zolpidem Tartrate


  (Ambien)  5 mg  PRN QHS  PRN  19 09:15


     














Labs:


Micro





RUN DATE: 19                                                              

  PAGE 1   


RUN TIME: 


                          [facility]


                       [address] [place] [address]


                        Jad Will M.D., Medical Director





----------------------------------

----------------------------------------------------------





PATIENT: VAL NOLASCO                     ACCT: [id number] LOC:  [address] [place] [address]      

U: [id number]


                                       AGE/SX: 59/F         ROOM: 588        

RE/10/19


REG DR:  ARISA HURLEY MD               :    1960   BED:  1          

DIS:         


                                       STATUS: ADM IN       TLOC:           


 

--------------------------------------------------------------------------------


------------








SPEC #: [id number]       SHARON:      STATUS:  RES            REQ 

#: [id number]


                            RECD:      SUBM DR: ELGIN TITUS DO 

          


SOURCE: BLOOD               ENTR: 09/10/     MATT DR: UNKNOWN PCP NAME   

          


[facility]:                                                                         

          


ORDERED:  BLD CULT - LC                                                         

 


------------------------------------------------------------------

--------------------------





  Procedure                         Result                                      

         


---------

--------------------------------------------------------------------------------


---





  BLOOD CULTURE LC  Preliminary  


        Preliminary report





  BLD CULT RESULT 1  Preliminary  


        Comment





        Staphylococcus species


        Performed at:  [facility]


        [address], [place], [place]  [id number]


        : WILLIAM Jarrett MD, Phone:  [phone]


        Staphylococcus simulans


        Based on resistance to oxacillin this isolate would be


        resistant to all currently available beta-lactam


        antimicrobial agents, with the exception of the newer


        cephalosporins with anti-MRSA activity, such as Ceftaroline





  BLD CULT RESULT 2  Preliminary  


        Streptococcus species





  ANTIMICROBIAL SUSCEPTIBILITY  Preliminary  


        Comment





              ** S = Susceptible; I = Intermediate; R = Resistant **


                           P = Positive; N = Negative


                    MICS are expressed in micrograms per mL


           Antibiotic                 RSLT#1    RSLT#2    RSLT#3    RSLT#4


        Ciprofloxacin                  R>=8


        Clindamycin                    R =R


        Erythromycin                   R>=8


        Gentamicin                     S<=0.5


        Levofloxacin                   I =4


        Nitrofurantoin                 S<=16


        Oxacillin                      R>=4


        Penicillin                     R>=0.5


        Rifampin                       S<=0.5


        Tetracycline                   R>=16


        Trimethoprim/Sulfa             S<=10


        Vancomycin                     S =1


        Performed at:  [facility]














                               ** CONTINUED ON NEXT PAGE **





RUN DATE: 19                                                              

  PAGE 2   


RUN TIME: 


                          [facility]


                       [address] [place] [address]


                        Jad Will M.D., Medical Director





-----------------------------------------

---------------------------------------------------





SPEC: [id number]    PATIENT: VAL NOLASCO                    [id number]  

(Continued)


 

--------------------------------------------------------------------------------


------------








-------------------

-------------------------------------------------------------------------





  Procedure                         Result                                      

         


 

--------------------------------------------------------------------------------


------------





  ANTIMICROBIAL SUSCEPTIBILITY  Preliminary   (continued)


        [address], [place], [place]  [id number]


        : WILLIAM Jarrett MD, Phone:  [phone]





 

--------------------------------------------------------------------------------


------------





Objective:


Assessment:


 Sepsis improving


 Bacteremia POA 3/ Staph simulans MRSE and streptococcus, 


 Fever,improved


 Lactic acidosis.


 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


peripherally inserted central line since 2018.


   History of acute pancreatitis with pseudocyst.


   Chronic obstructive pulmonary disease.


   Protein-calorie malnutrition.


  Mild splenomegaly.


   Anemia.


  Nursing home resident.


 .Morbid obesity.





Plan:


Plan of Care


cont vancomycin., meropenem  ,micafungin for now 


f/u id of strep in blood cultures,


    Status post one dose of Levaquin and aztreonam


Continue to monitor labs 


 Wound care per wound team


Follow up cultures.


 PICC line removed,catheter tip neg so far


f/u repeat bc neg so far from    


d/w son at bedside


D/W SCHUYLER TALLEY MD           Sep 14, 2019 10:29

## 2019-09-15 VITALS — DIASTOLIC BLOOD PRESSURE: 65 MMHG | SYSTOLIC BLOOD PRESSURE: 157 MMHG

## 2019-09-15 VITALS — DIASTOLIC BLOOD PRESSURE: 69 MMHG | SYSTOLIC BLOOD PRESSURE: 137 MMHG

## 2019-09-15 VITALS — DIASTOLIC BLOOD PRESSURE: 69 MMHG | SYSTOLIC BLOOD PRESSURE: 133 MMHG

## 2019-09-15 VITALS — DIASTOLIC BLOOD PRESSURE: 75 MMHG | SYSTOLIC BLOOD PRESSURE: 142 MMHG

## 2019-09-15 VITALS — SYSTOLIC BLOOD PRESSURE: 141 MMHG | DIASTOLIC BLOOD PRESSURE: 71 MMHG

## 2019-09-15 VITALS — DIASTOLIC BLOOD PRESSURE: 82 MMHG | SYSTOLIC BLOOD PRESSURE: 168 MMHG

## 2019-09-15 LAB
ANION GAP SERPL CALC-SCNC: 3 MMOL/L (ref 6–14)
BUN SERPL-MCNC: 19 MG/DL (ref 7–20)
CALCIUM SERPL-MCNC: 8.7 MG/DL (ref 8.5–10.1)
CHLORIDE SERPL-SCNC: 110 MMOL/L (ref 98–107)
CO2 SERPL-SCNC: 32 MMOL/L (ref 21–32)
CREAT SERPL-MCNC: 0.7 MG/DL (ref 0.6–1)
ERYTHROCYTE [DISTWIDTH] IN BLOOD BY AUTOMATED COUNT: 18.2 % (ref 11.5–14.5)
GFR SERPLBLD BASED ON 1.73 SQ M-ARVRAT: 85.9 ML/MIN
GLUCOSE SERPL-MCNC: 95 MG/DL (ref 70–99)
HCT VFR BLD CALC: 27.8 % (ref 36–47)
HGB BLD-MCNC: 8.9 G/DL (ref 12–15.5)
MCH RBC QN AUTO: 26 PG (ref 25–35)
MCHC RBC AUTO-ENTMCNC: 32 G/DL (ref 31–37)
MCV RBC AUTO: 82 FL (ref 79–100)
PLATELET # BLD AUTO: 221 X10^3/UL (ref 140–400)
POTASSIUM SERPL-SCNC: 3.8 MMOL/L (ref 3.5–5.1)
RBC # BLD AUTO: 3.41 X10^6/UL (ref 3.5–5.4)
SODIUM SERPL-SCNC: 145 MMOL/L (ref 136–145)
WBC # BLD AUTO: 6.8 X10^3/UL (ref 4–11)

## 2019-09-15 PROCEDURE — 02HV33Z INSERTION OF INFUSION DEVICE INTO SUPERIOR VENA CAVA, PERCUTANEOUS APPROACH: ICD-10-PCS

## 2019-09-15 PROCEDURE — 5A09357 ASSISTANCE WITH RESPIRATORY VENTILATION, LESS THAN 24 CONSECUTIVE HOURS, CONTINUOUS POSITIVE AIRWAY PRESSURE: ICD-10-PCS

## 2019-09-15 RX ADMIN — VANCOMYCIN HYDROCHLORIDE PRN EACH: 1 INJECTION, POWDER, LYOPHILIZED, FOR SOLUTION INTRAVENOUS at 11:18

## 2019-09-15 RX ADMIN — CITALOPRAM HYDROBROMIDE SCH MG: 20 TABLET ORAL at 08:28

## 2019-09-15 RX ADMIN — VANCOMYCIN HYDROCHLORIDE SCH MLS/HR: 1 INJECTION, POWDER, FOR SOLUTION INTRAVENOUS at 11:48

## 2019-09-15 RX ADMIN — IPRATROPIUM BROMIDE AND ALBUTEROL SULFATE SCH ML: .5; 3 SOLUTION RESPIRATORY (INHALATION) at 15:28

## 2019-09-15 RX ADMIN — DIPHENOXYLATE HYDROCHLORIDE AND ATROPINE SULFATE SCH TAB: 2.5; .025 TABLET ORAL at 11:51

## 2019-09-15 RX ADMIN — IPRATROPIUM BROMIDE AND ALBUTEROL SULFATE SCH ML: .5; 3 SOLUTION RESPIRATORY (INHALATION) at 10:57

## 2019-09-15 RX ADMIN — DIPHENOXYLATE HYDROCHLORIDE AND ATROPINE SULFATE SCH TAB: 2.5; .025 TABLET ORAL at 21:00

## 2019-09-15 RX ADMIN — DEXTROSE SCH MLS/HR: 50 INJECTION, SOLUTION INTRAVENOUS at 08:24

## 2019-09-15 RX ADMIN — Medication PRN EACH: at 11:18

## 2019-09-15 RX ADMIN — NYSTATIN SCH APP: 100000 POWDER TOPICAL at 21:00

## 2019-09-15 RX ADMIN — NYSTATIN SCH APP: 100000 POWDER TOPICAL at 08:30

## 2019-09-15 RX ADMIN — Medication PRN EACH: at 11:02

## 2019-09-15 RX ADMIN — DIPHENOXYLATE HYDROCHLORIDE AND ATROPINE SULFATE SCH TAB: 2.5; .025 TABLET ORAL at 08:29

## 2019-09-15 RX ADMIN — ENOXAPARIN SODIUM SCH MG: 150 INJECTION SUBCUTANEOUS at 09:23

## 2019-09-15 RX ADMIN — DIPHENOXYLATE HYDROCHLORIDE AND ATROPINE SULFATE SCH TAB: 2.5; .025 TABLET ORAL at 17:00

## 2019-09-15 RX ADMIN — Medication PRN EACH: at 11:42

## 2019-09-15 RX ADMIN — Medication SCH MG: at 21:00

## 2019-09-15 RX ADMIN — Medication SCH MG: at 08:29

## 2019-09-15 RX ADMIN — IPRATROPIUM BROMIDE AND ALBUTEROL SULFATE SCH ML: .5; 3 SOLUTION RESPIRATORY (INHALATION) at 20:29

## 2019-09-15 RX ADMIN — Medication PRN EACH: at 11:03

## 2019-09-15 RX ADMIN — APIXABAN SCH MG: 5 TABLET, FILM COATED ORAL at 21:46

## 2019-09-15 RX ADMIN — MEROPENEM SCH MLS/HR: 500 INJECTION, POWDER, FOR SOLUTION INTRAVENOUS at 05:39

## 2019-09-15 RX ADMIN — Medication SCH CAP: at 08:29

## 2019-09-15 RX ADMIN — PANTOPRAZOLE SODIUM SCH MG: 40 TABLET, DELAYED RELEASE ORAL at 08:22

## 2019-09-15 RX ADMIN — FAMOTIDINE SCH MG: 10 INJECTION, SOLUTION INTRAVENOUS at 08:25

## 2019-09-15 RX ADMIN — FAMOTIDINE SCH MG: 10 INJECTION, SOLUTION INTRAVENOUS at 21:46

## 2019-09-15 RX ADMIN — Medication SCH CAP: at 21:45

## 2019-09-15 RX ADMIN — IPRATROPIUM BROMIDE AND ALBUTEROL SULFATE SCH ML: .5; 3 SOLUTION RESPIRATORY (INHALATION) at 07:31

## 2019-09-15 NOTE — PDOC
PULMONARY PROGRESS NOTES


Subjective


Continues BIPAP at night. Reports feeling much better. More awake this am. 

Denies any SOB or increased cough.


Vitals





Vital Signs








  Date Time  Temp Pulse Resp B/P (MAP) Pulse Ox O2 Delivery O2 Flow Rate FiO2


 


9/15/19 07:32     94 BiPAP/CPAP  


 


9/15/19 03:00 97.5 78 18 142/75 (97)    





 97.5       


 


9/14/19 21:00       3.0 








ROS:  No Nausea, No Chest Pain, No Increase Cough


General:  Alert, Oriented X4, No acute distress


Lungs:  Clear


Cardiovascular:  S1, S2


Abdomen:  Soft, Non-tender, Other (colostomy )


Neuro Exam:  Alert


Extremities:  No Edema, Other


Skin:  Warm, Dry


Labs





Laboratory Tests








Test


 9/13/19


08:15 9/14/19


16:58 9/15/19


06:30


 


Sodium Level


 144 mmol/L


(136-145) 


 145 mmol/L


(136-145)


 


Potassium Level


 3.8 mmol/L


(3.5-5.1) 


 3.8 mmol/L


(3.5-5.1)


 


Chloride Level


 110 mmol/L


() 


 110 mmol/L


()


 


Carbon Dioxide Level


 28 mmol/L


(21-32) 


 32 mmol/L


(21-32)


 


Anion Gap 6 (6-14)   3 (6-14) 


 


Blood Urea Nitrogen


 19 mg/dL


(7-20) 


 19 mg/dL


(7-20)


 


Creatinine


 0.7 mg/dL


(0.6-1.0) 


 0.7 mg/dL


(0.6-1.0)


 


Estimated GFR


(Cockcroft-Gault) 85.9 


 


 85.9 





 


Glucose Level


 100 mg/dL


(70-99) 


 95 mg/dL


(70-99)


 


Calcium Level


 8.7 mg/dL


(8.5-10.1) 


 8.7 mg/dL


(8.5-10.1)


 


Phosphorus Level


 3.3 mg/dL


(2.6-4.7) 


 





 


Magnesium Level


 1.9 mg/dL


(1.8-2.4) 


 





 


Vancomycin Level Trough


 


 14.6 mcg/mL


(10.0-20.0) 





 


Vancomycin Last Dose Date  9/13/19  


 


Vancomycin Last Dose Time  2300  


 


White Blood Count


 


 


 6.8 x10^3/uL


(4.0-11.0)


 


Red Blood Count


 


 


 3.41 x10^6/uL


(3.50-5.40)


 


Hemoglobin


 


 


 8.9 g/dL


(12.0-15.5)


 


Hematocrit


 


 


 27.8 %


(36.0-47.0)


 


Mean Corpuscular Volume   82 fL () 


 


Mean Corpuscular Hemoglobin   26 pg (25-35) 


 


Mean Corpuscular Hemoglobin


Concent 


 


 32 g/dL


(31-37)


 


Red Cell Distribution Width


 


 


 18.2 %


(11.5-14.5)


 


Platelet Count


 


 


 221 x10^3/uL


(140-400)








Laboratory Tests








Test


 9/14/19


16:58 9/15/19


06:30


 


Vancomycin Level Trough


 14.6 mcg/mL


(10.0-20.0) 





 


Vancomycin Last Dose Date 9/13/19  


 


Vancomycin Last Dose Time 2300  


 


White Blood Count


 


 6.8 x10^3/uL


(4.0-11.0)


 


Red Blood Count


 


 3.41 x10^6/uL


(3.50-5.40)


 


Hemoglobin


 


 8.9 g/dL


(12.0-15.5)


 


Hematocrit


 


 27.8 %


(36.0-47.0)


 


Mean Corpuscular Volume  82 fL () 


 


Mean Corpuscular Hemoglobin  26 pg (25-35) 


 


Mean Corpuscular Hemoglobin


Concent 


 32 g/dL


(31-37)


 


Red Cell Distribution Width


 


 18.2 %


(11.5-14.5)


 


Platelet Count


 


 221 x10^3/uL


(140-400)


 


Sodium Level


 


 145 mmol/L


(136-145)


 


Potassium Level


 


 3.8 mmol/L


(3.5-5.1)


 


Chloride Level


 


 110 mmol/L


()


 


Carbon Dioxide Level


 


 32 mmol/L


(21-32)


 


Anion Gap  3 (6-14) 


 


Blood Urea Nitrogen


 


 19 mg/dL


(7-20)


 


Creatinine


 


 0.7 mg/dL


(0.6-1.0)


 


Estimated GFR


(Cockcroft-Gault) 


 85.9 





 


Glucose Level


 


 95 mg/dL


(70-99)


 


Calcium Level


 


 8.7 mg/dL


(8.5-10.1)








Medications





Active Scripts








 Medications  Dose


 Route/Sig


 Max Daily Dose Days Date Category


 


 Multivitamins


  (Multivitamin) 1


 Each Tablet  1 Tab


 PO DAILY


   9/10/19 Reported


 


 Vitamin D2


  (Ergocalciferol


  (Vitamin D2))


 50,000 Unit


 Capsule  50,000 Unit


 PO WEEKLY


   9/10/19 Reported


 


 Lovenox


  (Enoxaparin


 Sodium) 120


 Mg/0.8 Ml


 Disp.syrin  120 Mg


 SQ BID


   9/10/19 Reported


 


 Cyanocobalamin


 Injection


  (Cyanocobalamin


  (Vitamin B-12))


 1,000 Mcg/1 Ml


 Vial  1 Ml


 IM QMONTH


   9/10/19 Reported


 


 Calcium Carbonate


 500 Mg/5 Ml


 Oral.susp  500 Mg


 PO PRN Q12HRS PRN


   9/10/19 Reported


 


 Albuterol Sulfate


 Neb Soln


  (Albuterol


 Sulfate) 2.5 Mg/3


 Ml Vial.neb  1 Vial


 NEB PRN Q4HRS


   9/10/19 Reported


 


 Acetaminophen 500


 Mg Tablet  650 Mg


 PO PRN Q4HRS PRN


   9/10/19 Reported


 


 Zolpidem Tartrate


 5 Mg Tablet  5 Mg


 PO PRN QHS PRN


   9/10/19 Reported


 


 Miralax


  (Polyethylene


 Glycol 3350) 17


 Gm Powd.pack  1 Packet


 PO DAILY PRN


   9/10/19 Reported


 


 Morphine Sulfate


 15 Mg Tablet  1 Tab


 PO QID PRN


   9/10/19 Reported


 


 Remedy Phytoplex


 Antifungal


  (Miconazole


 Nitrate) 71 Gm


 Oint...g.  71 Gm


 TP PRN BID PRN


   9/10/19 Reported


 


 Miconazole


 Nitrate 45 Gm


 Cream.appl  45 Gm


 VG PRN Q24HRS PRN


   9/10/19 Reported


 


 Melatonin 3 Mg


 Tablet  3 Mg


 PO QHS PRN


   9/10/19 Reported


 


 Imodium A-D


  (Loperamide Hcl)


 1 Mg/7.5 Ml Liquid  2 Mg


 PO PRN Q2HR PRN


   9/10/19 Reported


 


 Lomotil Tablet


  (Diphenoxylate


 Hcl/Atropine) 1


 Each Tablet  1 Tab


 PO QID


   9/10/19 Reported


 


 Lexapro


  (Escitalopram


 Oxalate) 20 Mg


 Tablet  1 Tab


 PO DAILY


   9/10/19 Reported


 


 Cyanocobalamin


 Injection


  (Cyanocobalamin


  (Vitamin B-12))


 1,000 Mcg/1 Ml


 Vial  1 Ml


 IM QMONTH


   2/16/19 Reported


 


 Oxycodone Hcl 5


 Mg Capsule  10 Mg


 PO PRN Q4HRS PRN


   2/16/19 Reported


 


 Zofran


  (Ondansetron Hcl)


 4 Mg Tablet  4 Mg


 IVP PRN Q4HRS PRN


   2/16/19 Reported


 


 Ferrous Sulfate


 325 Mg Tablet  1 Tab


 PO BID


   2/16/19 Reported


 


 Acidophilus


  (Lactobacillus


 Acidophilus) 1


 Each Capsule  1 Each


 PO BID


   2/16/19 Reported


 


 Protonix


  (Pantoprazole


 Sodium) 20 Mg


 Tablet.dr  40 Mg


 PO DAILY


   2/16/19 Reported











Impression


.


1.  Acute respiratory failure, 2/2 sepsis-- improved 


2.  High-grade fever./ staph sepsis--resolved


3.  Metabolic acidosis secondary to lactic acidosis--resolved


4.  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 

peripherally inserted central line since 08/2018.---ongoing


5.  Chronic oxygen-dependent chronic obstructive pulmonary disease.--stable 


6.  Obstructive sleep apnea, on CPAP at home---not compliant


7.  History of acute pancreatitis with pseudocyst.


8.  Protein-calorie malnutrition, mild to moderate.


9.  Mild splenomegaly.


10.  Anemia.


11.  Morbid obesity.


12. suspected severe COPD





Plan


.


1.  Continue with prn BiPAP.


2. Bacteremia POA 3/4 Staph simulans MRSE and streptococcus, Blood culture from 

9/11 no growth to date, PICC tip culture also negative 


3.  Broad-spectrum antibiotic per Infectious Disease currently on van

co/akin/merm


4.  Deep vein thrombosis prophylaxis / Stress ulcer prophylaxis: Lovenox/Pepcid


45. OOB as tolerated 


 Discussed with RN .  We will follow along with you.











YAJAIRA EDMOND MD                 Sep 15, 2019 08:13

## 2019-09-15 NOTE — RAD
CHEST AP ONLY

 

History: Shortness of breath

 

Comparison: September 12, 2019

 

Findings:

Single view of the chest is submitted. 

There is prominent right perihilar and right upper lobe infiltrate, 

questionably slightly increased. Pericardial cardiac silhouette is again 

enlarged. There is again obscuration of left hemidiaphragm. Degree of 

right base airspace opacity is similar. No pneumothorax is identified. 

There is again right upper extremity PICC with the tip in the region of 

the inferior aspect of the superior vena cava. There again may be a very 

small right pleural effusion.

 

Impression: 

 

1.  There is persistent infiltrate of the right hemithorax, questionably 

slightly increased right upper lobe and perihilar region. There is 

persistent obscuration of the left hemidiaphragm likely due to pleural 

fluid with adjacent atelectasis/infiltrate. There may be very small right 

pleural effusion.

2. There is again enlargement of the pericardial cardiac silhouette.

 

Electronically signed by: Mauro Wagoner MD (9/15/2019 1:19 PM) [facility]

## 2019-09-15 NOTE — NUR
Pharmacy TPN Dosing Note



S: VAL NOLASCO is a 59 year old F Currently receiving Central Continuous TPN started 



B:Pertinent PMH: 

enterocutaneous fistula and ileocolonic fistula

Height: 5 feet, 4 inches

Weight: 135.923382 kg



Current diet: cardiac 



LABS:

Sodium:    144 

Potassium: 3.8 

Chloride:  110 

Calcium:   8.7 

Corrected Calcium: 10.30 

Magnesium: 1.9 

CO2:       28 

SCr:       0.7 

Glucose:   100 

Albumin:   2.0 

AST:       18 

ALT:       25 



TPN FORMULA:

TPN TYPE:  Central Continuous

AMINO ACIDS:         100 gm

DEXTROSE:            150 gm

LIPIDS:              30 gm

SODIUM CHLORIDE:     210 mEq

SODIUM ACETATE:      - mEq

SODIUM PHOSPHATE:    15 mmol

POTASSIUM CHLORIDE:  40 mEq

POTASSIUM ACETATE:   - mEq

POTASSIUM PHOSPHATE: - mmol

MAGNESIUM:           16 mEq

CALCIUM:             10 mEq

INSULIN:             - units

MULTIPLE VITAMIN:    10 ml

TRACE ELEMENTS:      1 ml(s)



TPN PLAN:  

labs stable, will continue current formula.





R: Continue TPN as ordered

Will monitor electrolytes, glucose, and tolerance to TPN.



 SEVEN BOOKER, [facility], 09/15/19 1242

## 2019-09-15 NOTE — PDOC
Infectious Disease Note


Subjective:


Subjective





feeling better


no fever, chills,nausea, vomiting,diarrhea, sob,cough, 


still has abdominal pain but improving


d/w rn





ROS:


ROS


Negative otherwise.





Vital Signs:


Vital Signs





Vital Signs








  Date Time  Temp Pulse Resp B/P (MAP) Pulse Ox O2 Delivery O2 Flow Rate FiO2


 


9/15/19 10:11   20  94  3.0 


 


9/15/19 08:31      BiPAP/CPAP  


 


9/15/19 07:00 97.0 84  168/82 (110)    





 97.0       











Physical Exam:


PHYSICAL EXAM


GENERAL:  Patient is lying in bed, on BiPAP, alert, appears tired.


HEENT:  No icterus, on BiPAP.


NECK:  Supple.


LUNGS:  Clear anteriorly.


HEART:  S1, S2, tachycardia.  No murmurs.


ABDOMEN:  Obese, soft, bowel sounds present.  Two stomas with surrounding


excoriation, redness, tenderness.


EXTREMITIES:  No edema or cyanosis.


DERMATOLOGIC:  Warm and dry.  No generalized rash.


NEUROLOGIC:  Alert and oriented x 3.


LINES:  Right upper extremity PICC line without signs of complication.





Medications:


Inpatient Meds:





Current Medications








 Medications


  (Trade)  Dose


 Ordered  Sig/Laura  Start Time


 Stop Time Status Last Admin


Dose Admin


 


 Acetaminophen


  (Tylenol Supp)  650 mg  1X  ONCE  19 23:45


 19 23:46 DC 9/10/19 00:28


650 MG


 


 Acetaminophen


  (Tylenol)  650 mg  PRN Q4HRS  PRN  9/10/19 19:45


    19 08:01


650 MG


 


 Albuterol Sulfate


  (Ventolin Neb


 Soln)  2.5 mg  PRN Q4HRS  PRN  19 09:15


     





 


 Albuterol/


 Ipratropium


  (Duoneb)  3 ml  RTQID  19 16:00


    9/15/19 07:31


3 ML


 


 Aztreonam


  (Azactam)  2 gm  1X  ONCE  19 22:30


 19 22:31 DC 19 22:51


2 GM


 


 Calcium Carbonate/


 Glycine


  (Tums)  500 mg  PRN BID  PRN  19 09:45


     





 


 Citalopram


 Hydrobromide


  (CeleXA)  40 mg  DAILY  19 10:30


    9/15/19 08:31


40 MG


 


 Cyanocobalamin


  (Vitamin B-12)  1,000 mcg  QMONTH  10/14/19 09:00


   UNV  





 


 Dexamethasone


 Sodium Phosphate


  (Decadron)  10 mg  1X  ONCE  19 22:30


 19 22:31 DC 19 23:58


10 MG


 


 Diphenoxylate HCl/


 Atropine


  (Lomotil)  1 tab  QID  19 13:00


    19 22:25


1 TAB


 


 Enoxaparin Sodium


  (Lovenox 100mg


 Syringe)  90 mg  1X  ONCE  19 10:30


 19 10:31 DC 19 10:07


90 MG


 


 Enoxaparin Sodium


  (Lovenox 150mg


 Syringe)  130 mg  BID  19 21:00


    9/15/19 09:25


130 MG


 


 Enoxaparin Sodium


  (Lovenox 40mg


 Syringe)  40 mg  Q12HR  19 21:00


 19 09:42 DC 19 09:26


40 MG


 


 Ergocalciferol


  (Vitamin D2)  50,000 unit  WEEKLY  19 09:00


     





 


 Famotidine


  (Pepcid Vial)  20 mg  BID  19 09:00


    9/15/19 08:31


20 MG


 


 Fentanyl Citrate


  (Fentanyl 2ml


 Vial)  50 mcg  PRN Q3HRS  PRN  19 20:15


    19 06:09


50 MCG


 


 Ferrous Sulfate


  (Feosol)  325 mg  BID  19 10:30


    9/15/19 08:31


325 MG


 


 Info


  (CONTRAST GIVEN


 -- Rx MONITORING)  1 each  PRN DAILY  PRN  19 23:30


 19 23:29 DC  





 


 Info


  (Tpn Per


 Pharmacy)  1 each  PRN DAILY  PRN  9/10/19 15:15


    19 13:14


1 EACH


 


 Iohexol


  (Omnipaque 350


 Mg/ml)  100 ml  1X  ONCE  19 23:30


 19 23:31 DC 19 23:37


100 ML


 


 Lactobacillus


 Rhamnosus


  (Culturelle)  1 cap  BID  19 21:00


    9/15/19 08:31


1 CAP


 


 Levofloxacin/


 Dextrose  150 ml @ 


 100 mls/hr  1X  ONCE  9/10/19 01:00


 9/10/19 02:29 DC 9/10/19 03:44


100 MLS/HR


 


 Loperamide HCl


  (Immodium Oral


 Susp)  2 mg  PRN Q2HR  PRN  19 09:15


     





 


 Magnesium Sulfate  50 ml @ 25


 mls/hr  1X  ONCE  9/10/19 01:00


 9/10/19 02:59 DC 9/10/19 03:44


25 MLS/HR


 


 Meropenem 500 mg/


 Sodium Chloride  50 ml @ 


 100 mls/hr  Q6HRS  9/10/19 08:30


    9/15/19 05:39


100 MLS/HR


 


 Micafungin Sodium


 100 mg/Dextrose  100 ml @ 


 100 mls/hr  Q24H  9/10/19 09:00


    9/15/19 08:31


100 MLS/HR


 


 Morphine Sulfate


  (Morphine Ir)  15 mg  PRN QID  PRN  19 09:15


     





 


 Non-Formulary


 Medication


  (Lactobacillus


 Acidophilus


  (Acidophilus))  1 each  BID  19 21:00


   UNV  





 


 Non-Formulary


 Medication


  (Melatonin )  3 mg  QHS  PRN  19 09:15


   UNV  





 


 Non-Formulary


 Medication


  (Miconazole


 Nitrate (Remedy


 Phytoplex


 Antifungal))  71 gm  PRN BID  PRN  19 09:15


   UNV  





 


 Non-Formulary


 Medication


  (Miconazole


 Nitrate )  45 gm  PRN Q24HRS  PRN  19 09:15


   UNV  





 


 Non-Formulary


 Medication


  (Multivitamin


  (Multivitamins))  1 tab  DAILY  9/15/19 09:00


   UNV  





 


 Non-Formulary


 Medication


  (Ondansetron Hcl


  (Zofran))  4 mg  PRN Q4HRS  PRN  19 09:15


 19 09:43 DC  





 


 Norepinephrine


 Bitartrate  250 ml @ 


 24.032 mls/


 hr  CONT  PRN  9/10/19 05:15


 19 08:41 DC 9/10/19 18:10


24.032 MLS/HR


 


 Nystatin


  (Nystop)  1 cathie  BID  9/10/19 21:00


    19 09:26


1 CATHIE


 


 Ondansetron HCl


  (Zofran)  4 mg  PRN Q4HRS  PRN  19 07:45


    19 05:34


4 MG


 


 Oxycodone HCl


  (Roxicodone)  10 mg  PRN Q4HRS  PRN  19 09:45


    9/15/19 08:31


10 MG


 


 Pantoprazole


 Sodium


  (Protonix)  40 mg  DAILYAC  19 10:30


    9/15/19 08:31


40 MG


 


 Polyethylene


 Glycol


  (miraLAX PACKET)  17 gm  PRN DAILY  PRN  19 09:15


     





 


 Sodium Bicarbonate


  (Sodium Bicarb


 Adult 8.4% Syr)  50 meq  1X  ONCE  9/10/19 14:00


 9/10/19 14:01 DC 9/10/19 14:02


50 MEQ


 


 Sodium Chloride


 210 meq/Sodium


 Phosphate 15 mmol/


 Potassium


 Chloride 40 meq/


 Magnesium Sulfate


 16 meq/Calcium


 Gluconate 10 meq/


 Multivitamins 10


 ml/Chromium/


 Copper/Manganese/


 Seleni/Zn 1 ml/


 Total Parenteral


 Nutrition/Amino


 Acids/Dextrose/


 Fat Emulsion


 Intravenous  1,500 ml @ 


 62.5 mls/hr  TPN  CONT  19 22:00


 9/15/19 21:59  19 22:25


62.5 MLS/HR


 


 Vancomycin HCl


  (Vanco Per


 Pharmacy)  1 each  PRN DAILY  PRN  9/10/19 08:30


    19 17:28


1 EACH


 


 Vancomycin HCl


  (Vancomycin


 Trough Level)  1 each  1X  ONCE  19 16:30


 19 16:31 DC  





 


 Vancomycin HCl


 1.5 gm/Sodium


 Chloride  500 ml @ 


 250 mls/hr  Q12H  19 11:00


   Cancel  





 


 Vancomycin HCl


 1.75 gm/Sodium


 Chloride  500 ml @ 


 250 mls/hr  Q18H  19 11:00


    19 18:00


250 MLS/HR


 


 Vancomycin HCl 2


 gm/Sodium Chloride  500 ml @ 


 250 mls/hr  1X  ONCE  9/10/19 01:15


 9/10/19 03:14 DC 9/10/19 01:30


250 MLS/HR


 


 Zolpidem Tartrate


  (Ambien)  5 mg  PRN QHS  PRN  19 09:15


     














Labs:


Lab





Laboratory Tests








Test


 19


16:58 9/15/19


06:30


 


Vancomycin Level Trough


 14.6 mcg/mL


(10.0-20.0) 





 


Vancomycin Last Dose Date 19  


 


Vancomycin Last Dose Time 2300  


 


White Blood Count


 


 6.8 x10^3/uL


(4.0-11.0)


 


Red Blood Count


 


 3.41 x10^6/uL


(3.50-5.40)


 


Hemoglobin


 


 8.9 g/dL


(12.0-15.5)


 


Hematocrit


 


 27.8 %


(36.0-47.0)


 


Mean Corpuscular Volume  82 fL () 


 


Mean Corpuscular Hemoglobin  26 pg (25-35) 


 


Mean Corpuscular Hemoglobin


Concent 


 32 g/dL


(31-37)


 


Red Cell Distribution Width


 


 18.2 %


(11.5-14.5)


 


Platelet Count


 


 221 x10^3/uL


(140-400)


 


Sodium Level


 


 145 mmol/L


(136-145)


 


Potassium Level


 


 3.8 mmol/L


(3.5-5.1)


 


Chloride Level


 


 110 mmol/L


()


 


Carbon Dioxide Level


 


 32 mmol/L


(21-32)


 


Anion Gap  3 (6-14) 


 


Blood Urea Nitrogen


 


 19 mg/dL


(7-20)


 


Creatinine


 


 0.7 mg/dL


(0.6-1.0)


 


Estimated GFR


(Cockcroft-Gault) 


 85.9 





 


Glucose Level


 


 95 mg/dL


(70-99)


 


Calcium Level


 


 8.7 mg/dL


(8.5-10.1)








Micro





RUN DATE: 19                                                              

  PAGE 1   


RUN TIME: 1610


                          [facility]


                       [address] [place] [address]


                        Jad Will M.D., Medical Director





----------------------

----------------------------------------------------------------------





PATIENT: VAL NOLASCO                     ACCT: [id number] LOC:  [address] [place] [address]      

U: [id number]


                                       AGE/SX: 59/F         ROOM: 588        

RE/10/19


REG DR:  RAISA HURLEY MD               :    1960   BED:  1          

DIS:         


                                       STATUS: ADM IN       TLOC:           


 

--------------------------------------------------------------------------------


------------








SPEC #: [id number]       SHARON:      STATUS:  COMP           REQ 

#: [id number]


                            RECD:      [facility] DR: ELGIN TITUS DO 

          


SOURCE: BLOOD               ENTR: 09/10/     [facility] DR: UNKNOWN PCP NAME   

          


[facility]:                                                                         

          


ORDERED:  BLD CULT - LC                                                         

 


------------------------------------------------------

--------------------------------------





  Procedure                         Result                                      

         


 

--------------------------------------------------------------------------------


------------





  BLOOD CULTURE LC  Final  


        Preliminary report


        Final report





  BLD CULT RESULT 1  Final  


        Comment





        Staphylococcus species


        Performed at:  [facility], [place], [place]  [id number]


        : WILLIAM Jarrett MD, Phone:  [phone]


        Staphylococcus simulans


        Based on resistance to oxacillin this isolate would be


        resistant to all currently available beta-lactam


        antimicrobial agents, with the exception of the newer


        cephalosporins with anti-MRSA activity, such as Ceftaroline


        Staphylococcus simulans


        Based on resistance to oxacillin this isolate would be


        resistant to all currently available beta-lactam


        antimicrobial agents, with the exception of the newer


        cephalosporins with anti-MRSA activity, such as Ceftaroline


        GENTAMICIN =S


        STREPTOMYCIN = S





  BLD CULT RESULT 2  Final  


        Streptococcus species


        Enterococcus faecalis





  ANTIMICROBIAL SUSCEPTIBILITY  Final  


        Comment





              ** S = Susceptible; I = Intermediate; R = Resistant **


                           P = Positive; N = Negative


                    MICS are expressed in micrograms per mL


           Antibiotic                 RSLT#1    RSLT#2    RSLT#3    RSLT#4


        Ciprofloxacin                  R>=8


        Clindamycin                    R =R


        Erythromycin                   R>=8


        Gentamicin                     S<=0.5














                               ** CONTINUED ON NEXT PAGE **





RUN DATE: 19                                                              

  PAGE 2   


RUN TIME: 1610


                          [facility]


                       [address] [place] [address]


                        Jad Will M.D., Medical Director





---------------------------------------------------------------------------

-----------------





SPEC: [id number]    PATIENT: VAL NOLASCO                    [id number]  

(Continued)


--------------------

------------------------------------------------------------------------








-----------------------------------------------------

---------------------------------------





  Procedure                         Result                                      

         


 

--------------------------------------------------------------------------------


------------





  ANTIMICROBIAL SUSCEPTIBILITY  Final   (continued)


        Levofloxacin                   I =4


        Nitrofurantoin                 S<=16


        Oxacillin                      R>=4


        Penicillin                     R>=0.5


        Rifampin                       S<=0.5


        Tetracycline                   R>=16


        Trimethoprim/Sulfa             S<=10


        Vancomycin                     S =1


        Performed at:  [address], [place]  [id number]


        : WILLIAM Jarrett MD, Phone:  [phone]


              ** S = Susceptible; I = Intermediate; R = Resistant **


                           P = Positive; N = Negative


                    MICS are expressed in micrograms per mL


           Antibiotic                 RSLT#1    RSLT#2    RSLT#3    RSLT#4


        Ciprofloxacin                  R>=8      S =1


        Clindamycin                    R =R


        Erythromycin                   R>=8


        Gentamicin                     S<=0.5


        Levofloxacin                   I =4      S =1


        Nitrofurantoin                 S<=16     S<=16


        Oxacillin                      R>=4


        Penicillin                     R>=0.5    S =2


        Rifampin                       S<=0.5


        Tetracycline                   R>=16     R>=16


        Trimethoprim/Sulfa             S<=10


        Vancomycin                     S =1      S<=0.5


        Performed at:  [place] [id number], [place]  [id number]


        : WILLIAM Jarrett MD, Phone:  [phone]





 

--------------------------------------------------------------------------------


------------





Objective:


Assessment:


 Sepsis improving


 Bacteremia POA 3/4 Staph simulans MRSE and enterococcus amp sensitive


 Fever,improved


 Lactic acidosis.


 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


peripherally inserted central line since 2018.


   History of acute pancreatitis with pseudocyst.


   Chronic obstructive pulmonary disease.


   Protein-calorie malnutrition.


  Mild splenomegaly.


   Anemia.


  Nursing home resident.


 .Morbid obesity.





Plan:


Plan of Care


cont vancomycin, micafungin 


monitor renal functions closely


 Status post one dose of Levaquin and aztreonam in ed


 chavo horn and observe


monitor abdominal wound,less excoriated now


 Wound care per wound team


Follow up cultures.


 PICC line removed,catheter tip neg so far


f/u repeat bc neg so far from    





D/W SCHUYLER TALLEY MD           Sep 15, 2019 10:53

## 2019-09-15 NOTE — PN
DATE:  09/15/2019



SUBJECTIVE:  The patient is resting, slightly propped up in bed, no apparent

distress, awake, alert, and eating her breakfast comfortably.  On questioning

her, she denied any complaints.  The nursing staff did not voice any concerns,

states she has an uneventful night.



PHYSICAL EXAMINATION:

GENERAL:  When I examined her, she looked pale.  No jaundice, cyanosis, or

thyromegaly.  No jugular venous distension.  No lower limb edema.

VITAL SIGNS:  Her heart rate was 84, blood pressure was 168/82, temperature was

97, respiratory rate 20, and oxygen saturation was 95% on BiPAP machine.

HEAD, EYES, EARS, NOSE, AND THROAT:  Showed normocephalic and atraumatic.

NECK:  Supple.

HEART:  Showed normal first and second heart sounds.  No gallop or murmur.

CHEST:  Clear to auscultation.  No crepitation or rhonchi.

ABDOMEN:  Distended, soft with enterocutaneous fistula in the right lower

quadrant, surrounding cellulitis.

NEUROLOGIC:  She is awake, alert, responding appropriately.  Cranial nerves are

intact.  She moves extremities without difficulty, although she is mostly bed

bound.



Her intake was 2600, output was 5275.



LABORATORY DATA:  As of this morning, her white cell count was 6800, hemoglobin

9, hematocrit 27, MCV 82, and platelet count 221,000.  Serum sodium was 145,

potassium 3.8, chloride 110, bicarbonate 32, anion gap of 3, BUN 19, creatinine

0.7, estimated GFR was 86 mL per minute, her glucose was 95, and calcium was

8.7.



ASSESSMENT:

1.  Sepsis, improving.  She has bacteremia with growth of Gram-positive cocci in

pairs, chains, and clusters.  Source of infection could be line related or

abdominal wall cellulitis.

2.  Lactic acidosis, resolved.

3.  Enterocutaneous fistula, on total parenteral nutrition.

4.  Chronic obstructive pulmonary disease.

5.  History of chronic pancreatitis and pseudocyst.

6.  Morbid obesity, obstructive sleep apnea.

7.  Chronic hypoxic hypercapnic respiratory failure, requiring BiPAP machine.



PLAN:  Plan is to continue with IV antibiotic in the form of vancomycin, Zosyn,

and micafungin.  Continue nutritional support.  Continue with wound care. 

Continue with BiPAP machine at nighttime and as needed.

 



______________________________

RAISA HURLEY MD



DR:  ALIYA/chapincito  JOB#:  [id number] / [id number]

DD:  09/15/2019 09:17  DT:  09/15/2019 22:43

## 2019-09-16 VITALS — SYSTOLIC BLOOD PRESSURE: 141 MMHG | DIASTOLIC BLOOD PRESSURE: 77 MMHG

## 2019-09-16 VITALS — SYSTOLIC BLOOD PRESSURE: 119 MMHG | DIASTOLIC BLOOD PRESSURE: 66 MMHG

## 2019-09-16 VITALS — SYSTOLIC BLOOD PRESSURE: 114 MMHG | DIASTOLIC BLOOD PRESSURE: 68 MMHG

## 2019-09-16 VITALS — DIASTOLIC BLOOD PRESSURE: 78 MMHG | SYSTOLIC BLOOD PRESSURE: 131 MMHG

## 2019-09-16 VITALS — SYSTOLIC BLOOD PRESSURE: 136 MMHG | DIASTOLIC BLOOD PRESSURE: 68 MMHG

## 2019-09-16 LAB
ANION GAP SERPL CALC-SCNC: 2 MMOL/L (ref 6–14)
BUN SERPL-MCNC: 15 MG/DL (ref 7–20)
CALCIUM SERPL-MCNC: 8.8 MG/DL (ref 8.5–10.1)
CHLORIDE SERPL-SCNC: 107 MMOL/L (ref 98–107)
CO2 SERPL-SCNC: 34 MMOL/L (ref 21–32)
CREAT SERPL-MCNC: 0.7 MG/DL (ref 0.6–1)
GFR SERPLBLD BASED ON 1.73 SQ M-ARVRAT: 85.9 ML/MIN
GLUCOSE SERPL-MCNC: 83 MG/DL (ref 70–99)
MAGNESIUM SERPL-MCNC: 1.8 MG/DL (ref 1.8–2.4)
PHOSPHATE SERPL-MCNC: 3.8 MG/DL (ref 2.6–4.7)
POTASSIUM SERPL-SCNC: 3.9 MMOL/L (ref 3.5–5.1)
SODIUM SERPL-SCNC: 143 MMOL/L (ref 136–145)

## 2019-09-16 PROCEDURE — 5A09357 ASSISTANCE WITH RESPIRATORY VENTILATION, LESS THAN 24 CONSECUTIVE HOURS, CONTINUOUS POSITIVE AIRWAY PRESSURE: ICD-10-PCS

## 2019-09-16 RX ADMIN — IPRATROPIUM BROMIDE AND ALBUTEROL SULFATE SCH ML: .5; 3 SOLUTION RESPIRATORY (INHALATION) at 11:13

## 2019-09-16 RX ADMIN — NYSTATIN SCH APP: 100000 POWDER TOPICAL at 21:00

## 2019-09-16 RX ADMIN — FENTANYL CITRATE PRN MCG: 50 INJECTION INTRAMUSCULAR; INTRAVENOUS at 15:42

## 2019-09-16 RX ADMIN — DIPHENOXYLATE HYDROCHLORIDE AND ATROPINE SULFATE SCH TAB: 2.5; .025 TABLET ORAL at 12:49

## 2019-09-16 RX ADMIN — IPRATROPIUM BROMIDE AND ALBUTEROL SULFATE SCH ML: .5; 3 SOLUTION RESPIRATORY (INHALATION) at 15:17

## 2019-09-16 RX ADMIN — DIPHENOXYLATE HYDROCHLORIDE AND ATROPINE SULFATE SCH TAB: 2.5; .025 TABLET ORAL at 21:19

## 2019-09-16 RX ADMIN — FAMOTIDINE SCH MG: 10 INJECTION, SOLUTION INTRAVENOUS at 21:20

## 2019-09-16 RX ADMIN — IPRATROPIUM BROMIDE AND ALBUTEROL SULFATE SCH ML: .5; 3 SOLUTION RESPIRATORY (INHALATION) at 07:15

## 2019-09-16 RX ADMIN — Medication SCH CAP: at 21:19

## 2019-09-16 RX ADMIN — Medication SCH CAP: at 11:02

## 2019-09-16 RX ADMIN — DEXTROSE SCH MLS/HR: 50 INJECTION, SOLUTION INTRAVENOUS at 11:03

## 2019-09-16 RX ADMIN — FENTANYL CITRATE PRN MCG: 50 INJECTION INTRAMUSCULAR; INTRAVENOUS at 19:46

## 2019-09-16 RX ADMIN — IPRATROPIUM BROMIDE AND ALBUTEROL SULFATE SCH ML: .5; 3 SOLUTION RESPIRATORY (INHALATION) at 19:53

## 2019-09-16 RX ADMIN — APIXABAN SCH MG: 5 TABLET, FILM COATED ORAL at 11:03

## 2019-09-16 RX ADMIN — APIXABAN SCH MG: 5 TABLET, FILM COATED ORAL at 21:19

## 2019-09-16 RX ADMIN — FAMOTIDINE SCH MG: 10 INJECTION, SOLUTION INTRAVENOUS at 11:03

## 2019-09-16 RX ADMIN — VANCOMYCIN HYDROCHLORIDE SCH MLS/HR: 1 INJECTION, POWDER, FOR SOLUTION INTRAVENOUS at 06:28

## 2019-09-16 RX ADMIN — Medication SCH MG: at 21:19

## 2019-09-16 RX ADMIN — PANTOPRAZOLE SODIUM SCH MG: 40 TABLET, DELAYED RELEASE ORAL at 11:02

## 2019-09-16 RX ADMIN — CITALOPRAM HYDROBROMIDE SCH MG: 20 TABLET ORAL at 11:02

## 2019-09-16 RX ADMIN — NYSTATIN SCH APP: 100000 POWDER TOPICAL at 09:00

## 2019-09-16 RX ADMIN — Medication SCH MG: at 09:00

## 2019-09-16 RX ADMIN — DIPHENOXYLATE HYDROCHLORIDE AND ATROPINE SULFATE SCH TAB: 2.5; .025 TABLET ORAL at 11:02

## 2019-09-16 RX ADMIN — VANCOMYCIN HYDROCHLORIDE PRN EACH: 1 INJECTION, POWDER, LYOPHILIZED, FOR SOLUTION INTRAVENOUS at 14:27

## 2019-09-16 RX ADMIN — DIPHENOXYLATE HYDROCHLORIDE AND ATROPINE SULFATE SCH TAB: 2.5; .025 TABLET ORAL at 16:36

## 2019-09-16 RX ADMIN — FENTANYL CITRATE PRN MCG: 50 INJECTION INTRAMUSCULAR; INTRAVENOUS at 23:00

## 2019-09-16 RX ADMIN — Medication PRN EACH: at 14:10

## 2019-09-16 RX ADMIN — VANCOMYCIN HYDROCHLORIDE SCH MLS/HR: 1 INJECTION, POWDER, FOR SOLUTION INTRAVENOUS at 23:00

## 2019-09-16 NOTE — PDOC
PULMONARY PROGRESS NOTES


Subjective


Continues BIPAP at night. Reports SOB


Vitals





Vital Signs








  Date Time  Temp Pulse Resp B/P (MAP) Pulse Ox O2 Delivery O2 Flow Rate FiO2


 


9/16/19 11:16     94 Nasal Cannula 3.0 


 


9/16/19 11:00 98.2 77 21 141/77 (98)    





 98.2       








ROS:  No Nausea, No Chest Pain, No Increase Cough


General:  Alert, Oriented X4, No acute distress


Lungs:  Clear


Cardiovascular:  S1, S2


Abdomen:  Soft, Non-tender, Other (colostomy )


Neuro Exam:  Alert


Extremities:  Other (2+edema)


Skin:  Warm, Dry


Labs





Laboratory Tests








Test


 9/14/19


16:58 9/15/19


06:30 9/16/19


06:25


 


Vancomycin Level Trough


 14.6 mcg/mL


(10.0-20.0) 


 





 


Vancomycin Last Dose Date 9/13/19   


 


Vancomycin Last Dose Time 2300   


 


White Blood Count


 


 6.8 x10^3/uL


(4.0-11.0) 





 


Red Blood Count


 


 3.41 x10^6/uL


(3.50-5.40) 





 


Hemoglobin


 


 8.9 g/dL


(12.0-15.5) 





 


Hematocrit


 


 27.8 %


(36.0-47.0) 





 


Mean Corpuscular Volume  82 fL ()  


 


Mean Corpuscular Hemoglobin  26 pg (25-35)  


 


Mean Corpuscular Hemoglobin


Concent 


 32 g/dL


(31-37) 





 


Red Cell Distribution Width


 


 18.2 %


(11.5-14.5) 





 


Platelet Count


 


 221 x10^3/uL


(140-400) 





 


Sodium Level


 


 145 mmol/L


(136-145) 143 mmol/L


(136-145)


 


Potassium Level


 


 3.8 mmol/L


(3.5-5.1) 3.9 mmol/L


(3.5-5.1)


 


Chloride Level


 


 110 mmol/L


() 107 mmol/L


()


 


Carbon Dioxide Level


 


 32 mmol/L


(21-32) 34 mmol/L


(21-32)


 


Anion Gap  3 (6-14)  2 (6-14) 


 


Blood Urea Nitrogen


 


 19 mg/dL


(7-20) 15 mg/dL


(7-20)


 


Creatinine


 


 0.7 mg/dL


(0.6-1.0) 0.7 mg/dL


(0.6-1.0)


 


Estimated GFR


(Cockcroft-Gault) 


 85.9 


 85.9 





 


Glucose Level


 


 95 mg/dL


(70-99) 83 mg/dL


(70-99)


 


Calcium Level


 


 8.7 mg/dL


(8.5-10.1) 8.8 mg/dL


(8.5-10.1)


 


Phosphorus Level


 


 


 3.8 mg/dL


(2.6-4.7)


 


Magnesium Level


 


 


 1.8 mg/dL


(1.8-2.4)








Laboratory Tests








Test


 9/16/19


06:25


 


Sodium Level


 143 mmol/L


(136-145)


 


Potassium Level


 3.9 mmol/L


(3.5-5.1)


 


Chloride Level


 107 mmol/L


()


 


Carbon Dioxide Level


 34 mmol/L


(21-32)


 


Anion Gap 2 (6-14) 


 


Blood Urea Nitrogen


 15 mg/dL


(7-20)


 


Creatinine


 0.7 mg/dL


(0.6-1.0)


 


Estimated GFR


(Cockcroft-Gault) 85.9 





 


Glucose Level


 83 mg/dL


(70-99)


 


Calcium Level


 8.8 mg/dL


(8.5-10.1)


 


Phosphorus Level


 3.8 mg/dL


(2.6-4.7)


 


Magnesium Level


 1.8 mg/dL


(1.8-2.4)








Medications





Active Scripts








 Medications  Dose


 Route/Sig


 Max Daily Dose Days Date Category


 


 Multivitamins


  (Multivitamin) 1


 Each Tablet  1 Tab


 PO DAILY


   9/10/19 Reported


 


 Vitamin D2


  (Ergocalciferol


  (Vitamin D2))


 50,000 Unit


 Capsule  50,000 Unit


 PO WEEKLY


   9/10/19 Reported


 


 Lovenox


  (Enoxaparin


 Sodium) 120


 Mg/0.8 Ml


 Disp.syrin  120 Mg


 SQ BID


   9/10/19 Reported


 


 Cyanocobalamin


 Injection


  (Cyanocobalamin


  (Vitamin B-12))


 1,000 Mcg/1 Ml


 Vial  1 Ml


 IM QMONTH


   9/10/19 Reported


 


 Calcium Carbonate


 500 Mg/5 Ml


 Oral.susp  500 Mg


 PO PRN Q12HRS PRN


   9/10/19 Reported


 


 Albuterol Sulfate


 Neb Soln


  (Albuterol


 Sulfate) 2.5 Mg/3


 Ml Vial.neb  1 Vial


 NEB PRN Q4HRS


   9/10/19 Reported


 


 Acetaminophen 500


 Mg Tablet  650 Mg


 PO PRN Q4HRS PRN


   9/10/19 Reported


 


 Zolpidem Tartrate


 5 Mg Tablet  5 Mg


 PO PRN QHS PRN


   9/10/19 Reported


 


 Miralax


  (Polyethylene


 Glycol 3350) 17


 Gm Powd.pack  1 Packet


 PO DAILY PRN


   9/10/19 Reported


 


 Morphine Sulfate


 15 Mg Tablet  1 Tab


 PO QID PRN


   9/10/19 Reported


 


 Remedy Phytoplex


 Antifungal


  (Miconazole


 Nitrate) 71 Gm


 Oint...g.  71 Gm


 TP PRN BID PRN


   9/10/19 Reported


 


 Miconazole


 Nitrate 45 Gm


 Cream.appl  45 Gm


 VG PRN Q24HRS PRN


   9/10/19 Reported


 


 Melatonin 3 Mg


 Tablet  3 Mg


 PO QHS PRN


   9/10/19 Reported


 


 Imodium A-D


  (Loperamide Hcl)


 1 Mg/7.5 Ml Liquid  2 Mg


 PO PRN Q2HR PRN


   9/10/19 Reported


 


 Lomotil Tablet


  (Diphenoxylate


 Hcl/Atropine) 1


 Each Tablet  1 Tab


 PO QID


   9/10/19 Reported


 


 Lexapro


  (Escitalopram


 Oxalate) 20 Mg


 Tablet  1 Tab


 PO DAILY


   9/10/19 Reported


 


 Cyanocobalamin


 Injection


  (Cyanocobalamin


  (Vitamin B-12))


 1,000 Mcg/1 Ml


 Vial  1 Ml


 IM QMONTH


   2/16/19 Reported


 


 Oxycodone Hcl 5


 Mg Capsule  10 Mg


 PO PRN Q4HRS PRN


   2/16/19 Reported


 


 Zofran


  (Ondansetron Hcl)


 4 Mg Tablet  4 Mg


 IVP PRN Q4HRS PRN


   2/16/19 Reported


 


 Ferrous Sulfate


 325 Mg Tablet  1 Tab


 PO BID


   2/16/19 Reported


 


 Acidophilus


  (Lactobacillus


 Acidophilus) 1


 Each Capsule  1 Each


 PO BID


   2/16/19 Reported


 


 Protonix


  (Pantoprazole


 Sodium) 20 Mg


 Tablet.dr  40 Mg


 PO DAILY


   2/16/19 Reported











Impression


.


1.  Acute respiratory failure, 2/2 sepsis-- improved 


2.  High-grade fever./ staph sepsis--resolved


3.  Metabolic acidosis secondary to lactic acidosis--resolved


4.  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 

peripherally inserted central line since 08/2018.---ongoing


5.  Chronic oxygen-dependent chronic obstructive pulmonary disease.--stable 


6.  Obstructive sleep apnea, on CPAP at home---not compliant


7.  History of acute pancreatitis with pseudocyst.


8.  Protein-calorie malnutrition, mild to moderate.


9.  Mild splenomegaly.


10.  Anemia.


11.  Morbid obesity.


12. suspected severe COPD





Plan


.


1.  Continue with prn BiPAP.


2. Bacteremia POA 3/4 Staph simulans MRSE and streptococcus, Blood culture from 

9/11 no growth to date, PICC tip culture also negative 


3.  Broad-spectrum antibiotic per Infectious Disease currently on 

vanco/kain/merm


4.  Deep vein thrombosis prophylaxis / Stress ulcer prophylaxis: Lovenox/Pepcid


5. OOB as tolerated 


6.  CXR 9/15 , mild increase in ? CHF, try lasix


 Discussed with RN .  We will follow along with you.











YAJAIRA EDMOND MD                 Sep 16, 2019 12:23

## 2019-09-16 NOTE — NUR
Pharmacy TPN Dosing Note



S: VAL NOLASCO is a 59 year old F Currently receiving Central Continuous TPN started 



B:Pertinent PMH: 

enterocutaneous fistula and ileocolonic fistula

Height: 5 feet, 4 inches

Weight: 135.518584 kg



Current diet: cardiac 



LABS:

Sodium:    143 

Potassium: 3.9 

Chloride:  107 

Calcium:   8.8 

Corrected Calcium: 10.40 

Magnesium: 1.8 

CO2:       34 

SCr:       0.7 

Glucose:   83 

Albumin:   2.0 

AST:       18 

ALT:       25 



TPN FORMULA:

TPN TYPE:  Central Continuous

AMINO ACIDS:         100 gm

DEXTROSE:            150 gm

LIPIDS:              30 gm

SODIUM CHLORIDE:     210 mEq

SODIUM ACETATE:      - mEq

SODIUM PHOSPHATE:    15 mmol

POTASSIUM CHLORIDE:  40 mEq

POTASSIUM ACETATE:   - mEq

POTASSIUM PHOSPHATE: - mmol

MAGNESIUM:           16 mEq

CALCIUM:             10 mEq

INSULIN:             - units

MULTIPLE VITAMIN:    10 ml

TRACE ELEMENTS:      1 ml(s)



TPN PLAN:  

labs stable, will continue current formula.





R: Continue TPN as ordered

Will monitor electrolytes, glucose, and tolerance to TPN.



 SEVEN BOOKER, [facility], 09/16/19 1412

## 2019-09-16 NOTE — PDOC
Infectious Disease Note


Subjective


Subjective





feeling better but a little SOA and on Bipap


no fever, chills,nausea, vomiting,diarrhea, sob,cough, 


still has abdominal pain but improving


d/w rn





Vital Sign


Vital Signs





Vital Signs








  Date Time  Temp Pulse Resp B/P (MAP) Pulse Ox O2 Delivery O2 Flow Rate FiO2


 


9/16/19 11:16     94 Nasal Cannula 3.0 


 


9/16/19 11:00 98.2 77 21 141/77 (98)    





 98.2       











Physical Exam


PHYSICAL EXAM


GENERAL:  Patient is lying in bed, on BiPAP, alert, appears tired.


HEENT:  No icterus, on BiPAP.


NECK:  Supple.


LUNGS:  Clear anteriorly.


HEART:  S1, S2, tachycardia.  No murmurs.


ABDOMEN:  Obese, soft, bowel sounds present.  Two stomas with surrounding


excoriation, redness, tenderness.


EXTREMITIES:  No edema or cyanosis.


DERMATOLOGIC:  Warm and dry.  No generalized rash.


NEUROLOGIC:  Alert and oriented x 3.


LINES:  Right upper extremity PICC line without signs of complication.





Labs


Lab





Laboratory Tests








Test


 9/16/19


06:25


 


Sodium Level


 143 mmol/L


(136-145)


 


Potassium Level


 3.9 mmol/L


(3.5-5.1)


 


Chloride Level


 107 mmol/L


()


 


Carbon Dioxide Level


 34 mmol/L


(21-32)


 


Anion Gap 2 (6-14) 


 


Blood Urea Nitrogen


 15 mg/dL


(7-20)


 


Creatinine


 0.7 mg/dL


(0.6-1.0)


 


Estimated GFR


(Cockcroft-Gault) 85.9 





 


Glucose Level


 83 mg/dL


(70-99)


 


Calcium Level


 8.8 mg/dL


(8.5-10.1)


 


Phosphorus Level


 3.8 mg/dL


(2.6-4.7)


 


Magnesium Level


 1.8 mg/dL


(1.8-2.4)








Micro





Microbiology


9/11/19 Blood Culture - Final, Complete


          NO GROWTH AFTER 5 DAYS


9/10/19 Aerobic Culture - Final, Complete


          


9/10/19 Aerobic Culture Result 1 (MATHEUS) - Final, Complete


          


9/10/19 Gram Stain - Final, Complete


          


9/10/19 Gram Stain Result 1 (MATHEUS) - Final, Complete


          


9/10/19 Gram Stain Result 2 (MATHEUS) - Final, Complete





Objective


Assessment


Sepsis improving


 Bacteremia POA 9/9 3/4 Staph simulans MRSE and enterococcus amp sensitive


PCN allergy - she denies


 Fever,improved


 Lactic acidosis.


 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


peripherally inserted central line since 08/2018.


   History of acute pancreatitis with pseudocyst.


   Chronic obstructive pulmonary disease.


   Protein-calorie malnutrition.


  Mild splenomegaly.


   Anemia.


  Nursing home resident.


 .Morbid obesity.





Plan


Plan of Care


cont vancomycin improving without ampicillin


D/c micafungin 


monitor renal functions closely


 Status post one dose of Levaquin and aztreonam in ed


 dc merrem and observe


monitor abdominal wound, less excoriated now


 Wound care per wound team


Follow up cultures.


 PICC line removed,catheter tip neg so far 9/10


f/u repeat bc neg so far from  9/11  


Lasix per  nursing


D/W TATYANA LISA MD              Sep 16, 2019 12:38

## 2019-09-17 VITALS — SYSTOLIC BLOOD PRESSURE: 106 MMHG | DIASTOLIC BLOOD PRESSURE: 58 MMHG

## 2019-09-17 VITALS — SYSTOLIC BLOOD PRESSURE: 127 MMHG | DIASTOLIC BLOOD PRESSURE: 71 MMHG

## 2019-09-17 VITALS — SYSTOLIC BLOOD PRESSURE: 107 MMHG | DIASTOLIC BLOOD PRESSURE: 59 MMHG

## 2019-09-17 VITALS — SYSTOLIC BLOOD PRESSURE: 111 MMHG | DIASTOLIC BLOOD PRESSURE: 63 MMHG

## 2019-09-17 VITALS — DIASTOLIC BLOOD PRESSURE: 52 MMHG | SYSTOLIC BLOOD PRESSURE: 111 MMHG

## 2019-09-17 VITALS — DIASTOLIC BLOOD PRESSURE: 60 MMHG | SYSTOLIC BLOOD PRESSURE: 113 MMHG

## 2019-09-17 LAB
ALBUMIN SERPL-MCNC: 2.2 G/DL (ref 3.4–5)
ALBUMIN/GLOB SERPL: 0.5 {RATIO} (ref 1–1.7)
ALP SERPL-CCNC: 131 U/L (ref 46–116)
ALT SERPL-CCNC: 28 U/L (ref 14–59)
ANION GAP SERPL CALC-SCNC: 6 MMOL/L (ref 6–14)
AST SERPL-CCNC: 27 U/L (ref 15–37)
BASOPHILS # BLD AUTO: 0 X10^3/UL (ref 0–0.2)
BASOPHILS NFR BLD: 0 % (ref 0–3)
BILIRUB SERPL-MCNC: 0.3 MG/DL (ref 0.2–1)
BUN SERPL-MCNC: 22 MG/DL (ref 7–20)
BUN/CREAT SERPL: 31 (ref 6–20)
CALCIUM SERPL-MCNC: 9.1 MG/DL (ref 8.5–10.1)
CHLORIDE SERPL-SCNC: 105 MMOL/L (ref 98–107)
CO2 SERPL-SCNC: 32 MMOL/L (ref 21–32)
CREAT SERPL-MCNC: 0.7 MG/DL (ref 0.6–1)
EOSINOPHIL NFR BLD: 0.3 X10^3/UL (ref 0–0.7)
EOSINOPHIL NFR BLD: 4 % (ref 0–3)
ERYTHROCYTE [DISTWIDTH] IN BLOOD BY AUTOMATED COUNT: 18.2 % (ref 11.5–14.5)
GFR SERPLBLD BASED ON 1.73 SQ M-ARVRAT: 85.9 ML/MIN
GLOBULIN SER-MCNC: 4.6 G/DL (ref 2.2–3.8)
GLUCOSE SERPL-MCNC: 95 MG/DL (ref 70–99)
HCT VFR BLD CALC: 28.3 % (ref 36–47)
HGB BLD-MCNC: 9.1 G/DL (ref 12–15.5)
LYMPHOCYTES # BLD: 0.8 X10^3/UL (ref 1–4.8)
LYMPHOCYTES NFR BLD AUTO: 9 % (ref 24–48)
MCH RBC QN AUTO: 26 PG (ref 25–35)
MCHC RBC AUTO-ENTMCNC: 32 G/DL (ref 31–37)
MCV RBC AUTO: 81 FL (ref 79–100)
MONO #: 0.5 X10^3/UL (ref 0–1.1)
MONOCYTES NFR BLD: 6 % (ref 0–9)
NEUT #: 7 X10^3/UL (ref 1.8–7.7)
NEUTROPHILS NFR BLD AUTO: 81 % (ref 31–73)
PLATELET # BLD AUTO: 264 X10^3/UL (ref 140–400)
POTASSIUM SERPL-SCNC: 3.8 MMOL/L (ref 3.5–5.1)
PROT SERPL-MCNC: 6.8 G/DL (ref 6.4–8.2)
RBC # BLD AUTO: 3.49 X10^6/UL (ref 3.5–5.4)
SODIUM SERPL-SCNC: 143 MMOL/L (ref 136–145)
WBC # BLD AUTO: 8.7 X10^3/UL (ref 4–11)

## 2019-09-17 PROCEDURE — 5A09357 ASSISTANCE WITH RESPIRATORY VENTILATION, LESS THAN 24 CONSECUTIVE HOURS, CONTINUOUS POSITIVE AIRWAY PRESSURE: ICD-10-PCS

## 2019-09-17 RX ADMIN — Medication SCH CAP: at 21:00

## 2019-09-17 RX ADMIN — APIXABAN SCH MG: 5 TABLET, FILM COATED ORAL at 20:59

## 2019-09-17 RX ADMIN — APIXABAN SCH MG: 5 TABLET, FILM COATED ORAL at 10:22

## 2019-09-17 RX ADMIN — Medication SCH MG: at 10:22

## 2019-09-17 RX ADMIN — DIPHENOXYLATE HYDROCHLORIDE AND ATROPINE SULFATE SCH TAB: 2.5; .025 TABLET ORAL at 17:42

## 2019-09-17 RX ADMIN — DIPHENOXYLATE HYDROCHLORIDE AND ATROPINE SULFATE SCH TAB: 2.5; .025 TABLET ORAL at 09:00

## 2019-09-17 RX ADMIN — IPRATROPIUM BROMIDE AND ALBUTEROL SULFATE SCH ML: .5; 3 SOLUTION RESPIRATORY (INHALATION) at 15:08

## 2019-09-17 RX ADMIN — IPRATROPIUM BROMIDE AND ALBUTEROL SULFATE SCH ML: .5; 3 SOLUTION RESPIRATORY (INHALATION) at 07:36

## 2019-09-17 RX ADMIN — Medication PRN EACH: at 14:08

## 2019-09-17 RX ADMIN — Medication SCH MG: at 21:00

## 2019-09-17 RX ADMIN — FAMOTIDINE SCH MG: 10 INJECTION, SOLUTION INTRAVENOUS at 21:01

## 2019-09-17 RX ADMIN — DIPHENOXYLATE HYDROCHLORIDE AND ATROPINE SULFATE SCH TAB: 2.5; .025 TABLET ORAL at 21:00

## 2019-09-17 RX ADMIN — Medication SCH CAP: at 10:23

## 2019-09-17 RX ADMIN — FENTANYL CITRATE PRN MCG: 50 INJECTION INTRAMUSCULAR; INTRAVENOUS at 17:39

## 2019-09-17 RX ADMIN — IPRATROPIUM BROMIDE AND ALBUTEROL SULFATE SCH ML: .5; 3 SOLUTION RESPIRATORY (INHALATION) at 19:07

## 2019-09-17 RX ADMIN — VANCOMYCIN HYDROCHLORIDE PRN EACH: 1 INJECTION, POWDER, LYOPHILIZED, FOR SOLUTION INTRAVENOUS at 14:15

## 2019-09-17 RX ADMIN — DIPHENOXYLATE HYDROCHLORIDE AND ATROPINE SULFATE SCH TAB: 2.5; .025 TABLET ORAL at 14:03

## 2019-09-17 RX ADMIN — FAMOTIDINE SCH MG: 10 INJECTION, SOLUTION INTRAVENOUS at 10:23

## 2019-09-17 RX ADMIN — FENTANYL CITRATE PRN MCG: 50 INJECTION INTRAMUSCULAR; INTRAVENOUS at 10:36

## 2019-09-17 RX ADMIN — VANCOMYCIN HYDROCHLORIDE SCH MLS/HR: 1 INJECTION, POWDER, FOR SOLUTION INTRAVENOUS at 17:42

## 2019-09-17 RX ADMIN — CITALOPRAM HYDROBROMIDE SCH MG: 20 TABLET ORAL at 10:21

## 2019-09-17 RX ADMIN — PANTOPRAZOLE SODIUM SCH MG: 40 TABLET, DELAYED RELEASE ORAL at 07:42

## 2019-09-17 RX ADMIN — FENTANYL CITRATE PRN MCG: 50 INJECTION INTRAMUSCULAR; INTRAVENOUS at 07:43

## 2019-09-17 RX ADMIN — Medication PRN EACH: at 14:00

## 2019-09-17 RX ADMIN — FENTANYL CITRATE PRN MCG: 50 INJECTION INTRAMUSCULAR; INTRAVENOUS at 03:47

## 2019-09-17 RX ADMIN — NYSTATIN SCH APP: 100000 POWDER TOPICAL at 21:00

## 2019-09-17 RX ADMIN — IPRATROPIUM BROMIDE AND ALBUTEROL SULFATE SCH ML: .5; 3 SOLUTION RESPIRATORY (INHALATION) at 11:15

## 2019-09-17 RX ADMIN — NYSTATIN SCH APP: 100000 POWDER TOPICAL at 09:00

## 2019-09-17 RX ADMIN — FENTANYL CITRATE PRN MCG: 50 INJECTION INTRAMUSCULAR; INTRAVENOUS at 20:59

## 2019-09-17 NOTE — PN
DATE:  09/16/2019



SUBJECTIVE:  The patient is resting, slightly propped up, sleeping comfortably,

found comfortably on BiPAP machine, maintaining her oxygen saturation at 96%, on

FiO2 of 30%.  On questioning her, she denied any complaint.  The nursing staff

did not voice any concern that she had uneventful night.  Her blood cultures

have grown coagulase-negative Staph, identified as Staph simulans.  She also

grew Enterococcus faecalis and she continues to be on vancomycin as well as

micafungin.



PHYSICAL EXAMINATION:

GENERAL:  When I examined her this morning, she looked pale, but no jaundice or

cyanosis.  No lymphadenopathy or thyromegaly.  No jugular venous distension.  No

limb edema.

VITAL SIGNS:  Her heart rate was 81, blood pressure was 133/69, temperature was

98.8, respiratory rate was 18, and oxygen saturation was 95%.

HEAD, EYES, EARS, NOSE AND THROAT:  Showed normocephalic, atraumatic.

NECK:  Supple.

HEART:  Showed normal first and second heart sounds.  No gallop or murmur.

CHEST:  Clear to auscultation.  No crepitation or rhonchi.

ABDOMEN:  Distended, soft with enterocutaneous fistula in the right lower

quadrant surrounding cellulitis that apparently is improving.  No tenderness. 

No guarding or rigidity.  No organomegaly.  Bowel sounds are normal.

NEUROLOGIC:  She was sleepy, but arousable.  All cranial nerves intact.  She

moves extremities without difficulty.



Her intake over the last 24 hours was 740, output was 2900.



LABORATORY DATA:  As of this morning, her serum sodium was 143, potassium 3.9,

chloride 107, bicarbonate 34, anion gap of 2, BUN 15, creatinine 0.7, estimated

GFR was 86 mL per minute, her glucose was 83, calcium was 8.8, phosphorus 3.8,

and magnesium was 1.8.  Her most recent white cell count was 6800, hemoglobin 9,

hematocrit 27, MCV 82, and platelet count 21,000.



ASSESSMENT:

1.  Sepsis, improving.  She has grown gram-positive cocci in pairs, chains, and

clusters identified as Staphylococcus simulans.

2.  Lactic acidosis, resolved.

3.  Enterocutaneous fistula, on total parenteral nutrition.

4.  Chronic obstructive pulmonary disease.

5.  History of chronic pancreatitis and pseudocyst.

6.  Morbid obesity, obstructive sleep apnea.

7.  Chronic hypoxic hypercapnic respiratory failure, requiring BiPAP.



PLAN:  To continue with IV vancomycin and micafungin.  Apparently, her meropenem

was discontinued.  Continue nutritional support.  Continue with wound care. 

Continue with BiPAP machine at nighttime as needed.

 



______________________________

RAISA HURLEY MD



DR:  ALIYA/chapincito  JOB#:  [id number] / [id number]

DD:  09/16/2019 08:34  DT:  09/16/2019 09:36

## 2019-09-17 NOTE — NUR
SW following pt. Updates faxed to [facility] and spoke with Larry regarding IV abx needs. 
Awaiting to hear back. Will continue to follow.

## 2019-09-17 NOTE — PDOC
PULMONARY PROGRESS NOTES


Subjective


Continues BIPAP at night. Reports LESS SOB


Vitals





Vital Signs








  Date Time  Temp Pulse Resp B/P (MAP) Pulse Ox O2 Delivery O2 Flow Rate FiO2


 


9/17/19 09:00   22     


 


9/17/19 07:43      Room Air  


 


9/17/19 07:36     92   


 


9/17/19 07:00 97.1 74  111/63 (79)   3.0 





 97.1       








ROS:  No Nausea, No Chest Pain, No Increase Cough


General:  Alert, Oriented X4, No acute distress


Lungs:  Clear


Cardiovascular:  S1, S2


Abdomen:  Soft, Non-tender, Other (colostomy )


Neuro Exam:  Alert


Extremities:  Other (1+EDEMA)


Skin:  Warm, Dry


Labs





Laboratory Tests








Test


 9/16/19


06:25 9/17/19


09:20


 


Sodium Level


 143 mmol/L


(136-145) 143 mmol/L


(136-145)


 


Potassium Level


 3.9 mmol/L


(3.5-5.1) 3.8 mmol/L


(3.5-5.1)


 


Chloride Level


 107 mmol/L


() 105 mmol/L


()


 


Carbon Dioxide Level


 34 mmol/L


(21-32) 32 mmol/L


(21-32)


 


Anion Gap 2 (6-14)  6 (6-14) 


 


Blood Urea Nitrogen


 15 mg/dL


(7-20) 22 mg/dL


(7-20)


 


Creatinine


 0.7 mg/dL


(0.6-1.0) 0.7 mg/dL


(0.6-1.0)


 


Estimated GFR


(Cockcroft-Gault) 85.9 


 85.9 





 


Glucose Level


 83 mg/dL


(70-99) 95 mg/dL


(70-99)


 


Calcium Level


 8.8 mg/dL


(8.5-10.1) 9.1 mg/dL


(8.5-10.1)


 


Phosphorus Level


 3.8 mg/dL


(2.6-4.7) 





 


Magnesium Level


 1.8 mg/dL


(1.8-2.4) 





 


White Blood Count


 


 8.7 x10^3/uL


(4.0-11.0)


 


Red Blood Count


 


 3.49 x10^6/uL


(3.50-5.40)


 


Hemoglobin


 


 9.1 g/dL


(12.0-15.5)


 


Hematocrit


 


 28.3 %


(36.0-47.0)


 


Mean Corpuscular Volume  81 fL () 


 


Mean Corpuscular Hemoglobin  26 pg (25-35) 


 


Mean Corpuscular Hemoglobin


Concent 


 32 g/dL


(31-37)


 


Red Cell Distribution Width


 


 18.2 %


(11.5-14.5)


 


Platelet Count


 


 264 x10^3/uL


(140-400)


 


Neutrophils (%) (Auto)  81 % (31-73) 


 


Lymphocytes (%) (Auto)  9 % (24-48) 


 


Monocytes (%) (Auto)  6 % (0-9) 


 


Eosinophils (%) (Auto)  4 % (0-3) 


 


Basophils (%) (Auto)  0 % (0-3) 


 


Neutrophils # (Auto)


 


 7.0 x10^3/uL


(1.8-7.7)


 


Lymphocytes # (Auto)


 


 0.8 x10^3/uL


(1.0-4.8)


 


Monocytes # (Auto)


 


 0.5 x10^3/uL


(0.0-1.1)


 


Eosinophils # (Auto)


 


 0.3 x10^3/uL


(0.0-0.7)


 


Basophils # (Auto)


 


 0.0 x10^3/uL


(0.0-0.2)


 


BUN/Creatinine Ratio  31 (6-20) 


 


Total Bilirubin


 


 0.3 mg/dL


(0.2-1.0)


 


Aspartate Amino Transf


(AST/SGOT) 


 27 U/L (15-37) 





 


Alanine Aminotransferase


(ALT/SGPT) 


 28 U/L (14-59) 





 


Alkaline Phosphatase


 


 131 U/L


()


 


Total Protein


 


 6.8 g/dL


(6.4-8.2)


 


Albumin


 


 2.2 g/dL


(3.4-5.0)


 


Albumin/Globulin Ratio  0.5 (1.0-1.7) 








Laboratory Tests








Test


 9/17/19


09:20


 


White Blood Count


 8.7 x10^3/uL


(4.0-11.0)


 


Red Blood Count


 3.49 x10^6/uL


(3.50-5.40)


 


Hemoglobin


 9.1 g/dL


(12.0-15.5)


 


Hematocrit


 28.3 %


(36.0-47.0)


 


Mean Corpuscular Volume 81 fL () 


 


Mean Corpuscular Hemoglobin 26 pg (25-35) 


 


Mean Corpuscular Hemoglobin


Concent 32 g/dL


(31-37)


 


Red Cell Distribution Width


 18.2 %


(11.5-14.5)


 


Platelet Count


 264 x10^3/uL


(140-400)


 


Neutrophils (%) (Auto) 81 % (31-73) 


 


Lymphocytes (%) (Auto) 9 % (24-48) 


 


Monocytes (%) (Auto) 6 % (0-9) 


 


Eosinophils (%) (Auto) 4 % (0-3) 


 


Basophils (%) (Auto) 0 % (0-3) 


 


Neutrophils # (Auto)


 7.0 x10^3/uL


(1.8-7.7)


 


Lymphocytes # (Auto)


 0.8 x10^3/uL


(1.0-4.8)


 


Monocytes # (Auto)


 0.5 x10^3/uL


(0.0-1.1)


 


Eosinophils # (Auto)


 0.3 x10^3/uL


(0.0-0.7)


 


Basophils # (Auto)


 0.0 x10^3/uL


(0.0-0.2)


 


Sodium Level


 143 mmol/L


(136-145)


 


Potassium Level


 3.8 mmol/L


(3.5-5.1)


 


Chloride Level


 105 mmol/L


()


 


Carbon Dioxide Level


 32 mmol/L


(21-32)


 


Anion Gap 6 (6-14) 


 


Blood Urea Nitrogen


 22 mg/dL


(7-20)


 


Creatinine


 0.7 mg/dL


(0.6-1.0)


 


Estimated GFR


(Cockcroft-Gault) 85.9 





 


BUN/Creatinine Ratio 31 (6-20) 


 


Glucose Level


 95 mg/dL


(70-99)


 


Calcium Level


 9.1 mg/dL


(8.5-10.1)


 


Total Bilirubin


 0.3 mg/dL


(0.2-1.0)


 


Aspartate Amino Transf


(AST/SGOT) 27 U/L (15-37) 





 


Alanine Aminotransferase


(ALT/SGPT) 28 U/L (14-59) 





 


Alkaline Phosphatase


 131 U/L


()


 


Total Protein


 6.8 g/dL


(6.4-8.2)


 


Albumin


 2.2 g/dL


(3.4-5.0)


 


Albumin/Globulin Ratio 0.5 (1.0-1.7) 








Medications





Active Scripts








 Medications  Dose


 Route/Sig


 Max Daily Dose Days Date Category


 


 Multivitamins


  (Multivitamin) 1


 Each Tablet  1 Tab


 PO DAILY


   9/10/19 Reported


 


 Vitamin D2


  (Ergocalciferol


  (Vitamin D2))


 50,000 Unit


 Capsule  50,000 Unit


 PO WEEKLY


   9/10/19 Reported


 


 Lovenox


  (Enoxaparin


 Sodium) 120


 Mg/0.8 Ml


 Disp.syrin  120 Mg


 SQ BID


   9/10/19 Reported


 


 Cyanocobalamin


 Injection


  (Cyanocobalamin


  (Vitamin B-12))


 1,000 Mcg/1 Ml


 Vial  1 Ml


 IM QMONTH


   9/10/19 Reported


 


 Calcium Carbonate


 500 Mg/5 Ml


 Oral.susp  500 Mg


 PO PRN Q12HRS PRN


   9/10/19 Reported


 


 Albuterol Sulfate


 Neb Soln


  (Albuterol


 Sulfate) 2.5 Mg/3


 Ml Vial.neb  1 Vial


 NEB PRN Q4HRS


   9/10/19 Reported


 


 Acetaminophen 500


 Mg Tablet  650 Mg


 PO PRN Q4HRS PRN


   9/10/19 Reported


 


 Zolpidem Tartrate


 5 Mg Tablet  5 Mg


 PO PRN QHS PRN


   9/10/19 Reported


 


 Miralax


  (Polyethylene


 Glycol 3350) 17


 Gm Powd.pack  1 Packet


 PO DAILY PRN


   9/10/19 Reported


 


 Morphine Sulfate


 15 Mg Tablet  1 Tab


 PO QID PRN


   9/10/19 Reported


 


 Remedy Phytoplex


 Antifungal


  (Miconazole


 Nitrate) 71 Gm


 Oint...g.  71 Gm


 TP PRN BID PRN


   9/10/19 Reported


 


 Miconazole


 Nitrate 45 Gm


 Cream.appl  45 Gm


 VG PRN Q24HRS PRN


   9/10/19 Reported


 


 Melatonin 3 Mg


 Tablet  3 Mg


 PO QHS PRN


   9/10/19 Reported


 


 Imodium A-D


  (Loperamide Hcl)


 1 Mg/7.5 Ml Liquid  2 Mg


 PO PRN Q2HR PRN


   9/10/19 Reported


 


 Lomotil Tablet


  (Diphenoxylate


 Hcl/Atropine) 1


 Each Tablet  1 Tab


 PO QID


   9/10/19 Reported


 


 Lexapro


  (Escitalopram


 Oxalate) 20 Mg


 Tablet  1 Tab


 PO DAILY


   9/10/19 Reported


 


 Cyanocobalamin


 Injection


  (Cyanocobalamin


  (Vitamin B-12))


 1,000 Mcg/1 Ml


 Vial  1 Ml


 IM QMONTH


   2/16/19 Reported


 


 Oxycodone Hcl 5


 Mg Capsule  10 Mg


 PO PRN Q4HRS PRN


   2/16/19 Reported


 


 Zofran


  (Ondansetron Hcl)


 4 Mg Tablet  4 Mg


 IVP PRN Q4HRS PRN


   2/16/19 Reported


 


 Ferrous Sulfate


 325 Mg Tablet  1 Tab


 PO BID


   2/16/19 Reported


 


 Acidophilus


  (Lactobacillus


 Acidophilus) 1


 Each Capsule  1 Each


 PO BID


   2/16/19 Reported


 


 Protonix


  (Pantoprazole


 Sodium) 20 Mg


 Tablet.dr  40 Mg


 PO DAILY


   2/16/19 Reported











Impression


.


1.  Acute respiratory failure, 2/2 sepsis-- improved 


2.  High-grade fever./ staph sepsis--resolved


3.  Metabolic acidosis secondary to lactic acidosis--resolved


4.  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 

peripherally inserted central line since 08/2018.---ongoing


5.  Chronic oxygen-dependent chronic obstructive pulmonary disease.--stable 


6.  Obstructive sleep apnea, on CPAP at home---not compliant


7.  History of acute pancreatitis with pseudocyst.


8.  Protein-calorie malnutrition, mild to moderate.


9.  Mild splenomegaly.


10.  Anemia.


11.  Morbid obesity.


12. suspected severe COPD





Plan


.


1.  Continue with prn BiPAP.


2. Bacteremia POA 3/4 Staph simulans MRSE and streptococcus, Blood culture from 

9/11 no growth to date, PICC tip culture also negative 


3.  Broad-spectrum antibiotic per Infectious Disease currently on 

vanco/akin/merm


4.  Deep vein thrombosis prophylaxis / Stress ulcer prophylaxis: Lovenox/Pepcid


5. OOB as tolerated 


6.  CXR 9/15 , mild increase in ? CHF, s/p lasix, 


 Discussed with RN .  We will follow along with you.











YAJAIRA EDMOND MD                 Sep 17, 2019 10:22

## 2019-09-17 NOTE — PDOC
Infectious Disease Note


Subjective


Subjective





feeling better SOA improved


no fever, chills,nausea, vomiting,diarrhea, sob,cough, 


abdominal pain improving





ROS


ROS


o/w neg





Vital Sign


Vital Signs





Vital Signs








  Date Time  Temp Pulse Resp B/P (MAP) Pulse Ox O2 Delivery O2 Flow Rate FiO2


 


9/17/19 10:36   20   Nasal Cannula 3.0 


 


9/17/19 07:36     92   


 


9/17/19 07:00 97.1 74  111/63 (79)    





 97.1       











Physical Exam


PHYSICAL EXAM


GENERAL:  Patient is lying in bed, off BiPAP, alert, appears well


HEENT:  No icterus, Oc/Op - clear.


NECK:  Supple.


LUNGS:  Clear anteriorly.


HEART:  S1, S2, tachycardia.  No murmurs.


ABDOMEN:  Obese, soft, bowel sounds present.  Two stomas with surrounding


excoriation, redness,improved  tenderness.


EXTREMITIES:  No edema or cyanosis.


DERMATOLOGIC:  Warm and dry.  No generalized rash.


NEUROLOGIC:  Alert and oriented x 3.


LINES:  Right upper extremity PICC line without signs of complication.





Labs


Lab





Laboratory Tests








Test


 9/17/19


09:20


 


White Blood Count


 8.7 x10^3/uL


(4.0-11.0)


 


Red Blood Count


 3.49 x10^6/uL


(3.50-5.40)


 


Hemoglobin


 9.1 g/dL


(12.0-15.5)


 


Hematocrit


 28.3 %


(36.0-47.0)


 


Mean Corpuscular Volume 81 fL () 


 


Mean Corpuscular Hemoglobin 26 pg (25-35) 


 


Mean Corpuscular Hemoglobin


Concent 32 g/dL


(31-37)


 


Red Cell Distribution Width


 18.2 %


(11.5-14.5)


 


Platelet Count


 264 x10^3/uL


(140-400)


 


Neutrophils (%) (Auto) 81 % (31-73) 


 


Lymphocytes (%) (Auto) 9 % (24-48) 


 


Monocytes (%) (Auto) 6 % (0-9) 


 


Eosinophils (%) (Auto) 4 % (0-3) 


 


Basophils (%) (Auto) 0 % (0-3) 


 


Neutrophils # (Auto)


 7.0 x10^3/uL


(1.8-7.7)


 


Lymphocytes # (Auto)


 0.8 x10^3/uL


(1.0-4.8)


 


Monocytes # (Auto)


 0.5 x10^3/uL


(0.0-1.1)


 


Eosinophils # (Auto)


 0.3 x10^3/uL


(0.0-0.7)


 


Basophils # (Auto)


 0.0 x10^3/uL


(0.0-0.2)


 


Sodium Level


 143 mmol/L


(136-145)


 


Potassium Level


 3.8 mmol/L


(3.5-5.1)


 


Chloride Level


 105 mmol/L


()


 


Carbon Dioxide Level


 32 mmol/L


(21-32)


 


Anion Gap 6 (6-14) 


 


Blood Urea Nitrogen


 22 mg/dL


(7-20)


 


Creatinine


 0.7 mg/dL


(0.6-1.0)


 


Estimated GFR


(Cockcroft-Gault) 85.9 





 


BUN/Creatinine Ratio 31 (6-20) 


 


Glucose Level


 95 mg/dL


(70-99)


 


Calcium Level


 9.1 mg/dL


(8.5-10.1)


 


Total Bilirubin


 0.3 mg/dL


(0.2-1.0)


 


Aspartate Amino Transf


(AST/SGOT) 27 U/L (15-37) 





 


Alanine Aminotransferase


(ALT/SGPT) 28 U/L (14-59) 





 


Alkaline Phosphatase


 131 U/L


()


 


Total Protein


 6.8 g/dL


(6.4-8.2)


 


Albumin


 2.2 g/dL


(3.4-5.0)


 


Albumin/Globulin Ratio 0.5 (1.0-1.7) 








Micro





Microbiology


9/11/19 Blood Culture - Final, Complete


          NO GROWTH AFTER 5 DAYS


9/10/19 Aerobic Culture - Final, Complete


          


9/10/19 Aerobic Culture Result 1 (MATHEUS) - Final, Complete


          


9/10/19 Gram Stain - Final, Complete


          


9/10/19 Gram Stain Result 1 (MATHEUS) - Final, Complete


          


9/10/19 Gram Stain Result 2 (MATHEUS) - Final, Complete





Objective


Assessment


Sepsis improving


 Bacteremia POA 9/9 3/4 Staph simulans MRSE and enterococcus amp sensitive


PCN allergy - she denies


 Fever,improved


 Lactic acidosis.


 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


peripherally inserted central line since 08/2018.


   History of acute pancreatitis with pseudocyst.


   Chronic obstructive pulmonary disease.


   Protein-calorie malnutrition.


  Mild splenomegaly.


   Anemia.


  Nursing home resident.


 .Morbid obesity.





Plan


Plan of Care


cont vancomycin improving without ampicillin she states she would like to avoid 

ampicillin.  Will need treatment though 9/24


monitor renal functions closely


 Status post one dose of Levaquin and aztreonam in ed


monitor abdominal wound, less excoriated now


 Wound care per wound team


 PICC line removed,catheter tip neg so far 9/10


f/u repeat bc neg so far from  9/11  





D/W RN and case management











TATYANA ROSENBAUM MD              Sep 17, 2019 11:18

## 2019-09-17 NOTE — PN
DATE:  09/17/2019



SUBJECTIVE:  The patient is resting, slightly propped up, sleeping comfortably

on BiPAP machine, maintaining her oxygen saturation at 98% on FiO2 of 30% on

BiPAP machine.  She is sleepy, but arousable.  On questioning her, she denied

any complaint.  Stated her pain is much better controlled.



PHYSICAL EXAMINATION:

GENERAL:  When I examined her, she looked pale.  No jaundice, cyanosis, or

thyromegaly.  No jugular venous distension.  No lower limb edema.

VITAL SIGNS:  Her heart rate was 74, blood pressure was 111/63, temperature was

97.1, respiratory rate was 20, and her oxygen saturation was 98%.

HEAD, EYES, EARS, NOSE AND THROAT:  Showed normocephalic, atraumatic.

NECK:  Supple.

HEART:  Showed normal first and second heart murmur.

CHEST:  Clear to auscultation.  No crepitation or rhonchi.

ABDOMEN:  Distended, soft, nontender.  No guarding or rigidity.  No

organomegaly.  All hernial orifices intact.  Bowel sounds normal.  She has an

enterocutaneous fistula with surrounding cellulitis and it is slightly better.

NEUROLOGIC:  She is sleepy, but arousable.  All cranial nerves are intact.  She

moves extremities without difficulty.



Her chest x-ray showed persistent infiltrate with right hemithorax, questionably

slight increased right upper lobe and perihilar region.  There is resistant to

obscuration of the left hemidiaphragm, likely due to pleural effusion, fluid and

adjacent atelectasis, infiltrate.  There may be a very small right pleural

effusion and there is again enlargement of pericardial cardiac silhouette.



LABORATORY DATA:  As of yesterday showed a hemoglobin 9, hematocrit 27 with

normal white cell count and platelets, and her chemistry showed a BUN of 15,

creatinine 0.7.



ASSESSMENT:

1.  Sepsis, improving.  She has grown gram-positive cocci identified as

Staphylococcus simulans.

2.  Lactic acidosis, resolved.

3.  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

4.  Chronic obstructive pulmonary disease.

5.  History of chronic pancreatitis and pseudocyst.

6.  Morbid obesity, obstructive sleep apnea.

7.  Chronic hypoxic hypercapnic respiratory failure, requiring BiPAP.



PLAN:  To continue with IV vancomycin and micafungin.  Meropenem was

discontinued.  Continue nutritional support.  Continue with wound care. 

Continue with BiPAP machine at nighttime and as needed.

 



______________________________

RAISA HURLEY MD



DR:  ALIYA/chapincito  JOB#:  [id number] / [id number]

DD:  09/17/2019 09:32  DT:  09/17/2019 10:28

## 2019-09-17 NOTE — NUR
Wound/Ostomy care

Pt seen for Ostomy and fistula care of abdomen. Per pt, appliance has been leaking for part 
of the day and maybe yesterday as well, dressings and appliance removed, skin cleaned with 
water and washcloths, wound area measured and pictured. Applied skin prep, ostomy powder, 4" 
rings and wound drainage bag. Good seal obtained. WC/Ostomy RN will continue to follow for 
bag changes. No other wounds noted, linens and gown changed, pt repositioned onto left side 
with wedge, heels floated. POC discussed with Cat RN re: new appliance application, all 
questions answered.

## 2019-09-18 VITALS — SYSTOLIC BLOOD PRESSURE: 104 MMHG | DIASTOLIC BLOOD PRESSURE: 61 MMHG

## 2019-09-18 VITALS — SYSTOLIC BLOOD PRESSURE: 101 MMHG | DIASTOLIC BLOOD PRESSURE: 56 MMHG

## 2019-09-18 VITALS — DIASTOLIC BLOOD PRESSURE: 56 MMHG | SYSTOLIC BLOOD PRESSURE: 105 MMHG

## 2019-09-18 VITALS — SYSTOLIC BLOOD PRESSURE: 106 MMHG | DIASTOLIC BLOOD PRESSURE: 60 MMHG

## 2019-09-18 LAB
ANION GAP SERPL CALC-SCNC: 6 MMOL/L (ref 6–14)
BUN SERPL-MCNC: 19 MG/DL (ref 7–20)
CALCIUM SERPL-MCNC: 7.2 MG/DL (ref 8.5–10.1)
CHLORIDE SERPL-SCNC: 115 MMOL/L (ref 98–107)
CO2 SERPL-SCNC: 25 MMOL/L (ref 21–32)
CREAT SERPL-MCNC: 0.6 MG/DL (ref 0.6–1)
GFR SERPLBLD BASED ON 1.73 SQ M-ARVRAT: 102.7 ML/MIN
GLUCOSE SERPL-MCNC: 85 MG/DL (ref 70–99)
POTASSIUM SERPL-SCNC: 3.2 MMOL/L (ref 3.5–5.1)
SODIUM SERPL-SCNC: 146 MMOL/L (ref 136–145)

## 2019-09-18 RX ADMIN — IPRATROPIUM BROMIDE AND ALBUTEROL SULFATE SCH ML: .5; 3 SOLUTION RESPIRATORY (INHALATION) at 11:24

## 2019-09-18 RX ADMIN — VANCOMYCIN HYDROCHLORIDE PRN EACH: 1 INJECTION, POWDER, LYOPHILIZED, FOR SOLUTION INTRAVENOUS at 12:25

## 2019-09-18 RX ADMIN — FENTANYL CITRATE PRN MCG: 50 INJECTION INTRAMUSCULAR; INTRAVENOUS at 04:47

## 2019-09-18 RX ADMIN — FENTANYL CITRATE PRN MCG: 50 INJECTION INTRAMUSCULAR; INTRAVENOUS at 09:27

## 2019-09-18 RX ADMIN — Medication PRN EACH: at 12:45

## 2019-09-18 RX ADMIN — IPRATROPIUM BROMIDE AND ALBUTEROL SULFATE SCH ML: .5; 3 SOLUTION RESPIRATORY (INHALATION) at 16:31

## 2019-09-18 RX ADMIN — DIPHENOXYLATE HYDROCHLORIDE AND ATROPINE SULFATE SCH TAB: 2.5; .025 TABLET ORAL at 08:30

## 2019-09-18 RX ADMIN — FENTANYL CITRATE PRN MCG: 50 INJECTION INTRAMUSCULAR; INTRAVENOUS at 16:04

## 2019-09-18 RX ADMIN — Medication SCH CAP: at 08:30

## 2019-09-18 RX ADMIN — VANCOMYCIN HYDROCHLORIDE SCH MLS/HR: 1 INJECTION, POWDER, FOR SOLUTION INTRAVENOUS at 11:42

## 2019-09-18 RX ADMIN — FENTANYL CITRATE PRN MCG: 50 INJECTION INTRAMUSCULAR; INTRAVENOUS at 00:07

## 2019-09-18 RX ADMIN — PANTOPRAZOLE SODIUM SCH MG: 40 TABLET, DELAYED RELEASE ORAL at 08:31

## 2019-09-18 RX ADMIN — FAMOTIDINE SCH MG: 10 INJECTION, SOLUTION INTRAVENOUS at 08:32

## 2019-09-18 RX ADMIN — POTASSIUM BICARBONATE ONE MEQ: 782 TABLET, EFFERVESCENT ORAL at 15:42

## 2019-09-18 RX ADMIN — IPRATROPIUM BROMIDE AND ALBUTEROL SULFATE SCH ML: .5; 3 SOLUTION RESPIRATORY (INHALATION) at 07:32

## 2019-09-18 RX ADMIN — NYSTATIN SCH APP: 100000 POWDER TOPICAL at 09:27

## 2019-09-18 RX ADMIN — DIPHENOXYLATE HYDROCHLORIDE AND ATROPINE SULFATE SCH TAB: 2.5; .025 TABLET ORAL at 15:45

## 2019-09-18 RX ADMIN — FENTANYL CITRATE PRN MCG: 50 INJECTION INTRAMUSCULAR; INTRAVENOUS at 12:55

## 2019-09-18 RX ADMIN — POTASSIUM BICARBONATE ONE MEQ: 782 TABLET, EFFERVESCENT ORAL at 14:00

## 2019-09-18 RX ADMIN — APIXABAN SCH MG: 5 TABLET, FILM COATED ORAL at 08:31

## 2019-09-18 RX ADMIN — Medication PRN EACH: at 12:11

## 2019-09-18 RX ADMIN — Medication SCH MG: at 08:31

## 2019-09-18 RX ADMIN — CITALOPRAM HYDROBROMIDE SCH MG: 20 TABLET ORAL at 08:31

## 2019-09-18 NOTE — PDOC
PULMONARY PROGRESS NOTES


Subjective


Continues BIPAP at night. Reports LESS SOB


Vitals





Vital Signs








  Date Time  Temp Pulse Resp B/P (MAP) Pulse Ox O2 Delivery O2 Flow Rate FiO2


 


9/18/19 09:27   20  98 Nasal Cannula  


 


9/18/19 07:53 98.6 76  101/56 (71)   3.0 





 98.6       








ROS:  No Nausea, No Chest Pain, No Increase Cough


General:  Alert, Oriented X4, No acute distress


Lungs:  Clear


Cardiovascular:  S1, S2


Abdomen:  Soft, Non-tender, Other (colostomy )


Neuro Exam:  Alert


Extremities:  Other (1+EDEMA)


Skin:  Warm, Dry


Labs





Laboratory Tests








Test


 9/17/19


09:20 9/18/19


04:30


 


White Blood Count


 8.7 x10^3/uL


(4.0-11.0) 





 


Red Blood Count


 3.49 x10^6/uL


(3.50-5.40) 





 


Hemoglobin


 9.1 g/dL


(12.0-15.5) 





 


Hematocrit


 28.3 %


(36.0-47.0) 





 


Mean Corpuscular Volume 81 fL ()  


 


Mean Corpuscular Hemoglobin 26 pg (25-35)  


 


Mean Corpuscular Hemoglobin


Concent 32 g/dL


(31-37) 





 


Red Cell Distribution Width


 18.2 %


(11.5-14.5) 





 


Platelet Count


 264 x10^3/uL


(140-400) 





 


Neutrophils (%) (Auto) 81 % (31-73)  


 


Lymphocytes (%) (Auto) 9 % (24-48)  


 


Monocytes (%) (Auto) 6 % (0-9)  


 


Eosinophils (%) (Auto) 4 % (0-3)  


 


Basophils (%) (Auto) 0 % (0-3)  


 


Neutrophils # (Auto)


 7.0 x10^3/uL


(1.8-7.7) 





 


Lymphocytes # (Auto)


 0.8 x10^3/uL


(1.0-4.8) 





 


Monocytes # (Auto)


 0.5 x10^3/uL


(0.0-1.1) 





 


Eosinophils # (Auto)


 0.3 x10^3/uL


(0.0-0.7) 





 


Basophils # (Auto)


 0.0 x10^3/uL


(0.0-0.2) 





 


Sodium Level


 143 mmol/L


(136-145) 146 mmol/L


(136-145)


 


Potassium Level


 3.8 mmol/L


(3.5-5.1) 3.2 mmol/L


(3.5-5.1)


 


Chloride Level


 105 mmol/L


() 115 mmol/L


()


 


Carbon Dioxide Level


 32 mmol/L


(21-32) 25 mmol/L


(21-32)


 


Anion Gap 6 (6-14)  6 (6-14) 


 


Blood Urea Nitrogen


 22 mg/dL


(7-20) 19 mg/dL


(7-20)


 


Creatinine


 0.7 mg/dL


(0.6-1.0) 0.6 mg/dL


(0.6-1.0)


 


Estimated GFR


(Cockcroft-Gault) 85.9 


 102.7 





 


BUN/Creatinine Ratio 31 (6-20)  


 


Glucose Level


 95 mg/dL


(70-99) 85 mg/dL


(70-99)


 


Calcium Level


 9.1 mg/dL


(8.5-10.1) 7.2 mg/dL


(8.5-10.1)


 


Total Bilirubin


 0.3 mg/dL


(0.2-1.0) 





 


Aspartate Amino Transf


(AST/SGOT) 27 U/L (15-37) 


 





 


Alanine Aminotransferase


(ALT/SGPT) 28 U/L (14-59) 


 





 


Alkaline Phosphatase


 131 U/L


() 





 


Total Protein


 6.8 g/dL


(6.4-8.2) 





 


Albumin


 2.2 g/dL


(3.4-5.0) 





 


Albumin/Globulin Ratio 0.5 (1.0-1.7)  








Laboratory Tests








Test


 9/18/19


04:30


 


Sodium Level


 146 mmol/L


(136-145)


 


Potassium Level


 3.2 mmol/L


(3.5-5.1)


 


Chloride Level


 115 mmol/L


()


 


Carbon Dioxide Level


 25 mmol/L


(21-32)


 


Anion Gap 6 (6-14) 


 


Blood Urea Nitrogen


 19 mg/dL


(7-20)


 


Creatinine


 0.6 mg/dL


(0.6-1.0)


 


Estimated GFR


(Cockcroft-Gault) 102.7 





 


Glucose Level


 85 mg/dL


(70-99)


 


Calcium Level


 7.2 mg/dL


(8.5-10.1)








Medications





Active Scripts








 Medications  Dose


 Route/Sig


 Max Daily Dose Days Date Category


 


 Multivitamins


  (Multivitamin) 1


 Each Tablet  1 Tab


 PO DAILY


   9/10/19 Reported


 


 Vitamin D2


  (Ergocalciferol


  (Vitamin D2))


 50,000 Unit


 Capsule  50,000 Unit


 PO WEEKLY


   9/10/19 Reported


 


 Lovenox


  (Enoxaparin


 Sodium) 120


 Mg/0.8 Ml


 Disp.syrin  120 Mg


 SQ BID


   9/10/19 Reported


 


 Cyanocobalamin


 Injection


  (Cyanocobalamin


  (Vitamin B-12))


 1,000 Mcg/1 Ml


 Vial  1 Ml


 IM QMONTH


   9/10/19 Reported


 


 Calcium Carbonate


 500 Mg/5 Ml


 Oral.susp  500 Mg


 PO PRN Q12HRS PRN


   9/10/19 Reported


 


 Albuterol Sulfate


 Neb Soln


  (Albuterol


 Sulfate) 2.5 Mg/3


 Ml Vial.neb  1 Vial


 NEB PRN Q4HRS


   9/10/19 Reported


 


 Acetaminophen 500


 Mg Tablet  650 Mg


 PO PRN Q4HRS PRN


   9/10/19 Reported


 


 Zolpidem Tartrate


 5 Mg Tablet  5 Mg


 PO PRN QHS PRN


   9/10/19 Reported


 


 Miralax


  (Polyethylene


 Glycol 3350) 17


 Gm Powd.pack  1 Packet


 PO DAILY PRN


   9/10/19 Reported


 


 Morphine Sulfate


 15 Mg Tablet  1 Tab


 PO QID PRN


   9/10/19 Reported


 


 Remedy Phytoplex


 Antifungal


  (Miconazole


 Nitrate) 71 Gm


 Oint...g.  71 Gm


 TP PRN BID PRN


   9/10/19 Reported


 


 Miconazole


 Nitrate 45 Gm


 Cream.appl  45 Gm


 VG PRN Q24HRS PRN


   9/10/19 Reported


 


 Melatonin 3 Mg


 Tablet  3 Mg


 PO QHS PRN


   9/10/19 Reported


 


 Imodium A-D


  (Loperamide Hcl)


 1 Mg/7.5 Ml Liquid  2 Mg


 PO PRN Q2HR PRN


   9/10/19 Reported


 


 Lomotil Tablet


  (Diphenoxylate


 Hcl/Atropine) 1


 Each Tablet  1 Tab


 PO QID


   9/10/19 Reported


 


 Lexapro


  (Escitalopram


 Oxalate) 20 Mg


 Tablet  1 Tab


 PO DAILY


   9/10/19 Reported


 


 Cyanocobalamin


 Injection


  (Cyanocobalamin


  (Vitamin B-12))


 1,000 Mcg/1 Ml


 Vial  1 Ml


 IM QMONTH


   2/16/19 Reported


 


 Oxycodone Hcl 5


 Mg Capsule  10 Mg


 PO PRN Q4HRS PRN


   2/16/19 Reported


 


 Zofran


  (Ondansetron Hcl)


 4 Mg Tablet  4 Mg


 IVP PRN Q4HRS PRN


   2/16/19 Reported


 


 Ferrous Sulfate


 325 Mg Tablet  1 Tab


 PO BID


   2/16/19 Reported


 


 Acidophilus


  (Lactobacillus


 Acidophilus) 1


 Each Capsule  1 Each


 PO BID


   2/16/19 Reported


 


 Protonix


  (Pantoprazole


 Sodium) 20 Mg


 Tablet.dr  40 Mg


 PO DAILY


   2/16/19 Reported











Impression


.


1.  Acute respiratory failure, 2/2 sepsis-- improved 


2.  High-grade fever./ staph sepsis--resolved


3.  Metabolic acidosis secondary to lactic acidosis--resolved


4.  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 

peripherally inserted central line since 08/2018.---ongoing


5.  Chronic oxygen-dependent chronic obstructive pulmonary disease.--stable 


6.  Obstructive sleep apnea, on CPAP at home---not compliant


7.  History of acute pancreatitis with pseudocyst.


8.  Protein-calorie malnutrition, mild to moderate.


9.  Mild splenomegaly.


10.  Anemia.


11.  Morbid obesity.


12. suspected severe COPD





Plan


.


1.  Continue with prn BiPAP.


2. Bacteremia POA 3/4 Staph simulans MRSE and streptococcus, Blood culture from 

9/11 no growth to date, PICC tip culture also negative 


3.  Broad-spectrum antibiotic per Infectious Disease currently on 

vanco/akin/merm


4.  Deep vein thrombosis prophylaxis / Stress ulcer prophylaxis: Lovenox/Pepcid


5. OOB as tolerated 


6.  CXR 9/15 , mild increase in ? CHF, s/p lasix, 


 Discussed with RN . 


   stable pulmonary clark. dc plans per YAJAIRA BEE MD                 Sep 18, 2019 11:12

## 2019-09-18 NOTE — NUR
SW following pt. FLAKO phoned and faxed orders (IV abx, TPN) to [facility]. FLAKO also 
contacted by [facility] for orders for TPN as they are provider for [facility]. SW confirmed TPN is 
ready and will be delivered to facility today. Pt will transport via facility arranged w/c 
van between [id number]. RN notified and FLAKO left a voice mail to pt's listed [facility], 
phone: [phone]. Discussed with Elaine at [facility].

## 2019-09-18 NOTE — PDOC
Infectious Disease Note


Subjective


Subjective





feeling much better


no fever, chills,nausea, vomiting,diarrhea, sob,cough, 


abdominal pain improving





ROS


ROS


o/w neg





Vital Sign


Vital Signs





Vital Signs








  Date Time  Temp Pulse Resp B/P (MAP) Pulse Ox O2 Delivery O2 Flow Rate FiO2


 


9/18/19 08:39   20     


 


9/18/19 07:53 98.6 76  101/56 (71) 98 Nasal Cannula 3.0 





 98.6       











Physical Exam


PHYSICAL EXAM


GENERAL:  Patient is in a chair, alert, appears really well


HEENT:  No icterus, Oc/Op - clear.


NECK:  Supple.


LUNGS:  Clear anteriorly.


HEART:  S1, S2, tachycardia.  No murmurs.


ABDOMEN:  Obese, soft, bowel sounds present.  Two stomas with -significantly 

improved


excoriation, redness,no tenderness. some flaking with resolved edema


EXTREMITIES:  No edema or cyanosis.


DERMATOLOGIC:  Warm and dry.  No generalized rash.


NEUROLOGIC:  Alert and oriented x 3.


LINES:  Right upper extremity PICC line without signs of complication.





Labs


Lab





Laboratory Tests








Test


 9/18/19


04:30


 


Sodium Level


 146 mmol/L


(136-145)


 


Potassium Level


 3.2 mmol/L


(3.5-5.1)


 


Chloride Level


 115 mmol/L


()


 


Carbon Dioxide Level


 25 mmol/L


(21-32)


 


Anion Gap 6 (6-14) 


 


Blood Urea Nitrogen


 19 mg/dL


(7-20)


 


Creatinine


 0.6 mg/dL


(0.6-1.0)


 


Estimated GFR


(Cockcroft-Gault) 102.7 





 


Glucose Level


 85 mg/dL


(70-99)


 


Calcium Level


 7.2 mg/dL


(8.5-10.1)








Micro





Microbiology


9/11/19 Blood Culture - Final, Complete


          NO GROWTH AFTER 5 DAYS


9/10/19 Aerobic Culture - Final, Complete


          


9/10/19 Aerobic Culture Result 1 (MATHEUS) - Final, Complete


          


9/10/19 Gram Stain - Final, Complete


          


9/10/19 Gram Stain Result 1 (MATHEUS) - Final, Complete


          


9/10/19 Gram Stain Result 2 (MATHEUS) - Final, Complete





Objective


Assessment


Sepsis improving


 Bacteremia POA 9/9 3/4 Staph simulans MRSE and enterococcus amp sensitive


PCN allergy - she denies


 Fever,improved


 Lactic acidosis.


 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


peripherally inserted central line since 08/2018.


   History of acute pancreatitis with pseudocyst.


   Chronic obstructive pulmonary disease.


   Protein-calorie malnutrition.


  Mild splenomegaly.


   Anemia.


  Nursing home resident.


 .Morbid obesity.





Plan


Plan of Care


Ok to treat with IV Vancomycin through 9/24


Q Monday Cr/Vanc trough and CBC.  keep Vanc trough between 15 and 20





Ok to transfer from  ID standpoint








D/W RN and case management











TATYANA ROSENBAUM MD              Sep 18, 2019 09:28

## 2021-09-16 NOTE — NUR
Pharmacy TPN Dosing Note



S: VAL NOLASCO is a 59 year old F Currently receiving Central Continuous TPN started 



B:Pertinent PMH: 

enterocutaneous fistula and ileocolonic fistula

Height: 5 feet, 4 inches

Weight: 129.565985 kg



Current diet: cardiac 



LABS:

Sodium:    143 

Potassium: 3.8 

Chloride:  105 

Calcium:   9.1 

Corrected Calcium: 10.54 

Magnesium: 1.8 

CO2:       32 

SCr:       0.7 

Glucose:   95 

Albumin:   2.2 

AST:       27 

ALT:       28 



TPN FORMULA:

TPN TYPE:  Central Continuous

AMINO ACIDS:         100 gm

DEXTROSE:            150 gm

LIPIDS:              30 gm

SODIUM CHLORIDE:     210 mEq

SODIUM ACETATE:      - mEq

SODIUM PHOSPHATE:    15 mmol

POTASSIUM CHLORIDE:  40 mEq

POTASSIUM ACETATE:   - mEq

POTASSIUM PHOSPHATE: - mmol

MAGNESIUM:           16 mEq

CALCIUM:             10 mEq

INSULIN:             - units

MULTIPLE VITAMIN:    10 ml

TRACE ELEMENTS:      1 ml(s)



TPN PLAN:  

labs stable, will continue current formula.





R: Continue TPN as ordered

Will monitor electrolytes, glucose, and tolerance to TPN.



 SEVEN BOOKER, [facility], 09/17/19 2013 Update History & Physical    The patient's History and Physical was reviewed with the patient and I examined the patient. There was no change. The surgical site was confirmed by the patient and me. Plan: The risks, benefits, expected outcome, and alternative to the recommended procedure have been discussed with the patient. Patient understands and wants to proceed with the procedure. The patient was counseled at length about the risks of giancarlo Covid-19 during their perioperative period and any recovery window from their procedure. The patient was made aware that giancarlo Covid-19  may worsen their prognosis for recovering from their procedure  and lend to a higher morbidity and/or mortality risk. All material risks, benefits, and reasonable alternatives including postponing the procedure were discussed. The patient does wish to proceed with the procedure at this time.     Electronically signed by Manuel Cisneros MD on 9/16/2021 at 11:45 AM

## 2022-12-02 NOTE — PDOC
PULMONARY PROGRESS NOTES


Subjective


feels better


off BIPAP


ABG  improved


Vitals





Vital Signs








  Date Time  Temp Pulse Resp B/P (MAP) Pulse Ox O2 Delivery O2 Flow Rate FiO2


 


9/13/19 10:53   3  95 Nasal Cannula  


 


9/13/19 10:00  96  114/55 (74)   3.0 


 


9/13/19 07:00 98.6       





 98.6       








ROS:  No Chest Pain, No Increase Cough


General:  Alert, No acute distress


Lungs:  Clear


Abdomen:  Soft, Other (obese,colostomy in place)


Extremities:  Other (1+edema)


Skin:  Warm, Dry


Labs





Laboratory Tests








Test


 9/12/19


08:55 9/12/19


11:50 9/13/19


08:15


 


Sodium Level


 146 mmol/L


(136-145) 


 144 mmol/L


(136-145)


 


Potassium Level


 4.0 mmol/L


(3.5-5.1) 


 3.8 mmol/L


(3.5-5.1)


 


Chloride Level


 111 mmol/L


() 


 110 mmol/L


()


 


Carbon Dioxide Level


 30 mmol/L


(21-32) 


 28 mmol/L


(21-32)


 


Anion Gap 5 (6-14)   6 (6-14) 


 


Blood Urea Nitrogen


 18 mg/dL


(7-20) 


 19 mg/dL


(7-20)


 


Creatinine


 0.7 mg/dL


(0.6-1.0) 


 0.7 mg/dL


(0.6-1.0)


 


Estimated GFR


(Cockcroft-Gault) 85.9 


 


 85.9 





 


Glucose Level


 131 mg/dL


(70-99) 


 100 mg/dL


(70-99)


 


Calcium Level


 8.3 mg/dL


(8.5-10.1) 


 8.7 mg/dL


(8.5-10.1)


 


Phosphorus Level


 3.3 mg/dL


(2.6-4.7) 


 3.3 mg/dL


(2.6-4.7)


 


Magnesium Level


 1.9 mg/dL


(1.8-2.4) 


 1.9 mg/dL


(1.8-2.4)


 


Vancomycin Level Trough


 10.2 mcg/mL


(10.0-20.0) 


 





 


Vancomycin Last Dose Date 09/11/19   


 


Vancomycin Last Dose Time 1400   


 


O2 Saturation  96 % (92-99)  


 


Arterial Blood pH


 


 7.35


(7.35-7.45) 





 


Arterial Blood pCO2 at


Patient Temp 


 40 mmHg


(35-46) 





 


Arterial Blood pO2 at Patient


Temp 


 91 mmHg


() 





 


Arterial Blood HCO3


 


 22 mmol/L


(21-28) 





 


Arterial Blood Base Excess


 


 -4 mmol/L


(-3-3) 





 


FiO2  28  








Laboratory Tests








Test


 9/12/19


11:50 9/13/19


08:15


 


O2 Saturation 96 % (92-99)  


 


Arterial Blood pH


 7.35


(7.35-7.45) 





 


Arterial Blood pCO2 at


Patient Temp 40 mmHg


(35-46) 





 


Arterial Blood pO2 at Patient


Temp 91 mmHg


() 





 


Arterial Blood HCO3


 22 mmol/L


(21-28) 





 


Arterial Blood Base Excess


 -4 mmol/L


(-3-3) 





 


FiO2 28  


 


Sodium Level


 


 144 mmol/L


(136-145)


 


Potassium Level


 


 3.8 mmol/L


(3.5-5.1)


 


Chloride Level


 


 110 mmol/L


()


 


Carbon Dioxide Level


 


 28 mmol/L


(21-32)


 


Anion Gap  6 (6-14) 


 


Blood Urea Nitrogen


 


 19 mg/dL


(7-20)


 


Creatinine


 


 0.7 mg/dL


(0.6-1.0)


 


Estimated GFR


(Cockcroft-Gault) 


 85.9 





 


Glucose Level


 


 100 mg/dL


(70-99)


 


Calcium Level


 


 8.7 mg/dL


(8.5-10.1)


 


Phosphorus Level


 


 3.3 mg/dL


(2.6-4.7)


 


Magnesium Level


 


 1.9 mg/dL


(1.8-2.4)








Medications





Active Scripts








 Medications  Dose


 Route/Sig


 Max Daily Dose Days Date Category


 


 Multivitamins


  (Multivitamin) 1


 Each Tablet  1 Tab


 PO DAILY


   9/10/19 Reported


 


 Vitamin D2


  (Ergocalciferol


  (Vitamin D2))


 50,000 Unit


 Capsule  50,000 Unit


 PO WEEKLY


   9/10/19 Reported


 


 Lovenox


  (Enoxaparin


 Sodium) 120


 Mg/0.8 Ml


 Disp.syrin  120 Mg


 SQ BID


   9/10/19 Reported


 


 Cyanocobalamin


 Injection


  (Cyanocobalamin


  (Vitamin B-12))


 1,000 Mcg/1 Ml


 Vial  1 Ml


 IM QMONTH


   9/10/19 Reported


 


 Calcium Carbonate


 500 Mg/5 Ml


 Oral.susp  500 Mg


 PO PRN Q12HRS PRN


   9/10/19 Reported


 


 Albuterol Sulfate


 Neb Soln


  (Albuterol


 Sulfate) 2.5 Mg/3


 Ml Vial.neb  1 Vial


 NEB PRN Q4HRS


   9/10/19 Reported


 


 Acetaminophen 500


 Mg Tablet  650 Mg


 PO PRN Q4HRS PRN


   9/10/19 Reported


 


 Zolpidem Tartrate


 5 Mg Tablet  5 Mg


 PO PRN QHS PRN


   9/10/19 Reported


 


 Miralax


  (Polyethylene


 Glycol 3350) 17


 Gm Powd.pack  1 Packet


 PO DAILY PRN


   9/10/19 Reported


 


 Morphine Sulfate


 15 Mg Tablet  1 Tab


 PO QID PRN


   9/10/19 Reported


 


 Remedy Phytoplex


 Antifungal


  (Miconazole


 Nitrate) 71 Gm


 Oint...g.  71 Gm


 TP PRN BID PRN


   9/10/19 Reported


 


 Miconazole


 Nitrate 45 Gm


 Cream.appl  45 Gm


 VG PRN Q24HRS PRN


   9/10/19 Reported


 


 Melatonin 3 Mg


 Tablet  3 Mg


 PO QHS PRN


   9/10/19 Reported


 


 Imodium A-D


  (Loperamide Hcl)


 1 Mg/7.5 Ml Liquid  2 Mg


 PO PRN Q2HR PRN


   9/10/19 Reported


 


 Lomotil Tablet


  (Diphenoxylate


 Hcl/Atropine) 1


 Each Tablet  1 Tab


 PO QID


   9/10/19 Reported


 


 Lexapro


  (Escitalopram


 Oxalate) 20 Mg


 Tablet  1 Tab


 PO DAILY


   9/10/19 Reported


 


 Cyanocobalamin


 Injection


  (Cyanocobalamin


  (Vitamin B-12))


 1,000 Mcg/1 Ml


 Vial  1 Ml


 IM QMONTH


   2/16/19 Reported


 


 Oxycodone Hcl 5


 Mg Capsule  10 Mg


 PO PRN Q4HRS PRN


   2/16/19 Reported


 


 Zofran


  (Ondansetron Hcl)


 4 Mg Tablet  4 Mg


 IVP PRN Q4HRS PRN


   2/16/19 Reported


 


 Ferrous Sulfate


 325 Mg Tablet  1 Tab


 PO BID


   2/16/19 Reported


 


 Acidophilus


  (Lactobacillus


 Acidophilus) 1


 Each Capsule  1 Each


 PO BID


   2/16/19 Reported


 


 Protonix


  (Pantoprazole


 Sodium) 20 Mg


 Tablet.dr  40 Mg


 PO DAILY


   2/16/19 Reported











Impression


.


1.  Acute respiratory failure, required BIPAP on admit due to sepsis.


2.  High-grade fever./ staph sepsis.  The patient has history of central line 

associated


bloodstream infection and I suspect that this is the likely source.  She has


been on total parenteral nutrition chronically due to extensive abdominal


surgeries.


3.  Metabolic acidosis secondary to lactic acidosis.


4.  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


peripherally inserted central line since 08/2018.


5.  Chronic oxygen-dependent chronic obstructive pulmonary disease.


6.  Obstructive sleep apnea, on CPAP at home. not compliant.


7.  History of acute pancreatitis with pseudocyst.


8.  Protein-calorie malnutrition, mild to moderate.


9.  Mild splenomegaly.


10.  Anemia.


11.  Morbid obesity.


12. suspected severe COPD





Plan


.





1.  Continue with prn BiPAP.


2.  ABGs.with compensated respiratory acidosis


3.  Gram positive bacteremia


4.  Broad-spectrum antibiotic per Infectious Disease.


5.  Follow all blood cultures.


6.  Clinically, less likely pneumonia.  CT findings may be related to


atelectasis, but we will monitor.


7.  Deep vein thrombosis prophylaxis.


8.  Stress ulcer prophylaxis.


9.  Discussed with RN and RT.  We will follow along with you.











YAJAIRA EDMOND MD                 Sep 13, 2019 11:07 No

## 2024-06-05 NOTE — NUR
Discharge Note:



ELIANE NOLASCO



Discharge instructions and discharge home medications reviewed with Patient and a copy 
given. All questions have been answered and understanding verbalized. 



The following instructions and handouts were given: Discharge instructions (diet, activity 
etc), medication list, follow up appointments and patient education





Patient discharged to [facility] on a stretcher.  
Report called to KYRIE Maier. Exercise Session Details